# Patient Record
Sex: MALE | Race: WHITE | Employment: OTHER | URBAN - NONMETROPOLITAN AREA
[De-identification: names, ages, dates, MRNs, and addresses within clinical notes are randomized per-mention and may not be internally consistent; named-entity substitution may affect disease eponyms.]

---

## 2017-03-19 PROBLEM — N17.0 ACUTE RENAL FAILURE WITH TUBULAR NECROSIS (HCC): Status: ACTIVE | Noted: 2017-03-19

## 2017-03-19 PROBLEM — I25.810 CORONARY ARTERY DISEASE INVOLVING CORONARY BYPASS GRAFT OF NATIVE HEART: Status: ACTIVE | Noted: 2017-03-19

## 2017-03-19 PROBLEM — Z86.73 HX TIA/STROKE W/O RESID: Status: ACTIVE | Noted: 2017-03-19

## 2017-03-19 PROBLEM — I50.23 ACUTE ON CHRONIC SYSTOLIC CHF (CONGESTIVE HEART FAILURE), NYHA CLASS 2 (HCC): Status: ACTIVE | Noted: 2017-03-19

## 2017-03-19 PROBLEM — A41.9 SEPSIS (HCC): Status: ACTIVE | Noted: 2017-03-19

## 2017-03-19 PROBLEM — I25.2 HX OF NON-ST ELEVATION MYOCARDIAL INFARCTION (NSTEMI): Chronic | Status: ACTIVE | Noted: 2017-03-19

## 2017-03-20 PROBLEM — I25.810 CORONARY ARTERY DISEASE INVOLVING CORONARY BYPASS GRAFT OF NATIVE HEART: Status: RESOLVED | Noted: 2017-03-19 | Resolved: 2017-03-20

## 2017-03-20 PROBLEM — I50.23 ACUTE ON CHRONIC SYSTOLIC CHF (CONGESTIVE HEART FAILURE), NYHA CLASS 2 (HCC): Status: RESOLVED | Noted: 2017-03-19 | Resolved: 2017-03-20

## 2017-03-21 PROBLEM — N20.0 NEPHROLITHIASIS: Status: ACTIVE | Noted: 2017-03-21

## 2017-03-21 PROBLEM — N17.0 ACUTE RENAL FAILURE WITH TUBULAR NECROSIS (HCC): Status: RESOLVED | Noted: 2017-03-19 | Resolved: 2017-03-21

## 2017-03-21 PROBLEM — R65.10 SIRS (SYSTEMIC INFLAMMATORY RESPONSE SYNDROME) (HCC): Status: ACTIVE | Noted: 2017-03-19

## 2017-03-29 ENCOUNTER — TELEPHONE (OUTPATIENT)
Dept: UROLOGY | Age: 82
End: 2017-03-29

## 2017-03-31 ENCOUNTER — TELEPHONE (OUTPATIENT)
Dept: UROLOGY | Age: 82
End: 2017-03-31

## 2017-03-31 DIAGNOSIS — Z01.818 PRE-OP TESTING: ICD-10-CM

## 2017-03-31 DIAGNOSIS — N20.0 NEPHROLITHIASIS: Primary | ICD-10-CM

## 2017-04-04 ENCOUNTER — TELEPHONE (OUTPATIENT)
Dept: UROLOGY | Age: 82
End: 2017-04-04

## 2017-04-04 DIAGNOSIS — N20.0 NEPHROLITHIASIS: Primary | ICD-10-CM

## 2017-04-11 ENCOUNTER — TELEPHONE (OUTPATIENT)
Dept: UROLOGY | Age: 82
End: 2017-04-11

## 2017-04-13 ENCOUNTER — OFFICE VISIT (OUTPATIENT)
Dept: UROLOGY | Age: 82
End: 2017-04-13

## 2017-04-13 ENCOUNTER — TELEPHONE (OUTPATIENT)
Dept: UROLOGY | Age: 82
End: 2017-04-13

## 2017-04-13 VITALS
WEIGHT: 240 LBS | DIASTOLIC BLOOD PRESSURE: 64 MMHG | HEIGHT: 69 IN | SYSTOLIC BLOOD PRESSURE: 122 MMHG | BODY MASS INDEX: 35.55 KG/M2

## 2017-04-13 DIAGNOSIS — N20.0 KIDNEY STONE: Primary | ICD-10-CM

## 2017-04-13 LAB
BILIRUBIN URINE: NEGATIVE
BLOOD URINE, POC: NORMAL
CHARACTER, URINE: CLEAR
COLOR, URINE: YELLOW
GLUCOSE URINE: NEGATIVE MG/DL
KETONES, URINE: NEGATIVE
LEUKOCYTE CLUMPS, URINE: NORMAL
NITRITE, URINE: NEGATIVE
PH, URINE: 7
PROTEIN, URINE: NORMAL MG/DL
SPECIFIC GRAVITY, URINE: 1.01 (ref 1–1.03)
UROBILINOGEN, URINE: 0.2 EU/DL

## 2017-04-13 PROCEDURE — 1036F TOBACCO NON-USER: CPT | Performed by: UROLOGY

## 2017-04-13 PROCEDURE — 99024 POSTOP FOLLOW-UP VISIT: CPT | Performed by: UROLOGY

## 2017-04-13 PROCEDURE — 81003 URINALYSIS AUTO W/O SCOPE: CPT | Performed by: UROLOGY

## 2017-04-13 RX ORDER — ASPIRIN 325 MG
325 TABLET ORAL DAILY
COMMUNITY
End: 2017-04-20 | Stop reason: HOSPADM

## 2017-04-19 ENCOUNTER — TELEPHONE (OUTPATIENT)
Dept: UROLOGY | Age: 82
End: 2017-04-19

## 2017-05-02 ENCOUNTER — PROCEDURE VISIT (OUTPATIENT)
Dept: UROLOGY | Age: 82
End: 2017-05-02

## 2017-05-02 ENCOUNTER — TELEPHONE (OUTPATIENT)
Dept: UROLOGY | Age: 82
End: 2017-05-02

## 2017-05-02 VITALS
HEIGHT: 68 IN | BODY MASS INDEX: 36.37 KG/M2 | DIASTOLIC BLOOD PRESSURE: 70 MMHG | WEIGHT: 240 LBS | SYSTOLIC BLOOD PRESSURE: 128 MMHG

## 2017-05-02 DIAGNOSIS — N20.0 KIDNEY STONE ON LEFT SIDE: ICD-10-CM

## 2017-05-02 DIAGNOSIS — N20.0 RIGHT KIDNEY STONE: Primary | ICD-10-CM

## 2017-05-02 LAB
BILIRUBIN URINE: NEGATIVE
BLOOD URINE, POC: NORMAL
CHARACTER, URINE: CLEAR
COLOR, URINE: YELLOW
GLUCOSE URINE: NEGATIVE MG/DL
KETONES, URINE: NEGATIVE
LEUKOCYTE CLUMPS, URINE: NORMAL
NITRITE, URINE: NEGATIVE
PH, URINE: 7
PROTEIN, URINE: NEGATIVE MG/DL
SPECIFIC GRAVITY, URINE: 1.02 (ref 1–1.03)
UROBILINOGEN, URINE: 0.2 EU/DL

## 2017-05-02 PROCEDURE — 52310 CYSTOSCOPY AND TREATMENT: CPT | Performed by: UROLOGY

## 2017-05-02 PROCEDURE — 1036F TOBACCO NON-USER: CPT | Performed by: UROLOGY

## 2017-05-02 PROCEDURE — 99999 PR OFFICE/OUTPT VISIT,PROCEDURE ONLY: CPT | Performed by: UROLOGY

## 2017-05-02 PROCEDURE — 81003 URINALYSIS AUTO W/O SCOPE: CPT | Performed by: UROLOGY

## 2017-05-03 ENCOUNTER — OFFICE VISIT (OUTPATIENT)
Dept: CARDIOLOGY | Age: 82
End: 2017-05-03

## 2017-05-03 ENCOUNTER — TELEPHONE (OUTPATIENT)
Dept: UROLOGY | Age: 82
End: 2017-05-03

## 2017-05-03 VITALS
HEART RATE: 92 BPM | WEIGHT: 240 LBS | SYSTOLIC BLOOD PRESSURE: 158 MMHG | DIASTOLIC BLOOD PRESSURE: 62 MMHG | HEIGHT: 68 IN | BODY MASS INDEX: 36.37 KG/M2

## 2017-05-03 DIAGNOSIS — Z98.890 S/P CARDIAC CATHETERIZATION: ICD-10-CM

## 2017-05-03 DIAGNOSIS — I25.2 HX OF NON-ST ELEVATION MYOCARDIAL INFARCTION (NSTEMI): Primary | Chronic | ICD-10-CM

## 2017-05-03 DIAGNOSIS — Z95.1 S/P CABG (CORONARY ARTERY BYPASS GRAFT): ICD-10-CM

## 2017-05-03 DIAGNOSIS — N20.0 KIDNEY STONE: ICD-10-CM

## 2017-05-03 DIAGNOSIS — Z01.818 PRE-OP TESTING: Primary | ICD-10-CM

## 2017-05-03 PROCEDURE — 1123F ACP DISCUSS/DSCN MKR DOCD: CPT | Performed by: INTERNAL MEDICINE

## 2017-05-03 PROCEDURE — G8427 DOCREV CUR MEDS BY ELIG CLIN: HCPCS | Performed by: INTERNAL MEDICINE

## 2017-05-03 PROCEDURE — 4040F PNEUMOC VAC/ADMIN/RCVD: CPT | Performed by: INTERNAL MEDICINE

## 2017-05-03 PROCEDURE — G8598 ASA/ANTIPLAT THER USED: HCPCS | Performed by: INTERNAL MEDICINE

## 2017-05-03 PROCEDURE — 1036F TOBACCO NON-USER: CPT | Performed by: INTERNAL MEDICINE

## 2017-05-03 PROCEDURE — 99213 OFFICE O/P EST LOW 20 MIN: CPT | Performed by: INTERNAL MEDICINE

## 2017-05-03 PROCEDURE — G8417 CALC BMI ABV UP PARAM F/U: HCPCS | Performed by: INTERNAL MEDICINE

## 2017-05-03 ASSESSMENT — ENCOUNTER SYMPTOMS
EYES NEGATIVE: 1
GASTROINTESTINAL NEGATIVE: 1
RESPIRATORY NEGATIVE: 1

## 2017-06-15 ENCOUNTER — OFFICE VISIT (OUTPATIENT)
Dept: UROLOGY | Age: 82
End: 2017-06-15

## 2017-06-15 VITALS
HEIGHT: 68 IN | SYSTOLIC BLOOD PRESSURE: 118 MMHG | BODY MASS INDEX: 36.37 KG/M2 | DIASTOLIC BLOOD PRESSURE: 52 MMHG | WEIGHT: 240 LBS

## 2017-06-15 DIAGNOSIS — N20.0 KIDNEY STONE: Primary | ICD-10-CM

## 2017-06-15 LAB
BILIRUBIN URINE: NEGATIVE
BLOOD URINE, POC: NORMAL
CHARACTER, URINE: CLEAR
COLOR, URINE: YELLOW
GLUCOSE URINE: NEGATIVE MG/DL
KETONES, URINE: NEGATIVE
LEUKOCYTE CLUMPS, URINE: NORMAL
NITRITE, URINE: NEGATIVE
PH, URINE: 5.5
PROTEIN, URINE: NEGATIVE MG/DL
SPECIFIC GRAVITY, URINE: 1.01 (ref 1–1.03)
UROBILINOGEN, URINE: 0.2 EU/DL

## 2017-06-15 PROCEDURE — 99024 POSTOP FOLLOW-UP VISIT: CPT | Performed by: UROLOGY

## 2017-06-15 PROCEDURE — 81003 URINALYSIS AUTO W/O SCOPE: CPT | Performed by: UROLOGY

## 2017-11-07 ENCOUNTER — OFFICE VISIT (OUTPATIENT)
Dept: CARDIOLOGY CLINIC | Age: 82
End: 2017-11-07
Payer: MEDICARE

## 2017-11-07 VITALS
WEIGHT: 253.4 LBS | BODY MASS INDEX: 37.53 KG/M2 | HEART RATE: 92 BPM | DIASTOLIC BLOOD PRESSURE: 68 MMHG | SYSTOLIC BLOOD PRESSURE: 150 MMHG | HEIGHT: 69 IN

## 2017-11-07 DIAGNOSIS — N18.30 CKD (CHRONIC KIDNEY DISEASE) STAGE 3, GFR 30-59 ML/MIN (HCC): Chronic | ICD-10-CM

## 2017-11-07 DIAGNOSIS — Z98.890 S/P CARDIAC CATHETERIZATION: Primary | ICD-10-CM

## 2017-11-07 DIAGNOSIS — Z95.1 S/P CABG (CORONARY ARTERY BYPASS GRAFT): ICD-10-CM

## 2017-11-07 DIAGNOSIS — I25.2 HX OF NON-ST ELEVATION MYOCARDIAL INFARCTION (NSTEMI): Chronic | ICD-10-CM

## 2017-11-07 PROCEDURE — 1123F ACP DISCUSS/DSCN MKR DOCD: CPT | Performed by: INTERNAL MEDICINE

## 2017-11-07 PROCEDURE — G8484 FLU IMMUNIZE NO ADMIN: HCPCS | Performed by: INTERNAL MEDICINE

## 2017-11-07 PROCEDURE — 99213 OFFICE O/P EST LOW 20 MIN: CPT | Performed by: INTERNAL MEDICINE

## 2017-11-07 PROCEDURE — 93000 ELECTROCARDIOGRAM COMPLETE: CPT | Performed by: INTERNAL MEDICINE

## 2017-11-07 PROCEDURE — 4040F PNEUMOC VAC/ADMIN/RCVD: CPT | Performed by: INTERNAL MEDICINE

## 2017-11-07 PROCEDURE — G8427 DOCREV CUR MEDS BY ELIG CLIN: HCPCS | Performed by: INTERNAL MEDICINE

## 2017-11-07 PROCEDURE — G8598 ASA/ANTIPLAT THER USED: HCPCS | Performed by: INTERNAL MEDICINE

## 2017-11-07 PROCEDURE — 1036F TOBACCO NON-USER: CPT | Performed by: INTERNAL MEDICINE

## 2017-11-07 PROCEDURE — G8417 CALC BMI ABV UP PARAM F/U: HCPCS | Performed by: INTERNAL MEDICINE

## 2017-11-07 RX ORDER — METOPROLOL TARTRATE 100 MG/1
50 TABLET ORAL DAILY
COMMUNITY
End: 2018-05-07 | Stop reason: ALTCHOICE

## 2017-11-07 ASSESSMENT — ENCOUNTER SYMPTOMS
EYES NEGATIVE: 1
RESPIRATORY NEGATIVE: 1
GASTROINTESTINAL NEGATIVE: 1

## 2017-11-07 NOTE — PROGRESS NOTES
[facility] PROFESSIONAL SERVS  [facility] [place]  [address]  [address]  [address] [address]  Dept: [phone]  Dept Fax: [phone]  Loc: [phone]    Visit Date: 11/7/2017    Mr. Sangita Arreguin is a 80 y.o. who is here for follow up on coronary artery disease status post CABG in June 2016. He feels good. No dizziness or syncope. HPI:   Mr. Sangita Arreguin is a 80 y.o. male who is seen today for follow up on the cardiac problems mentioned below. MEDICAL DIAGNOSES  Patient Active Problem List    Diagnosis Date Noted    Calculus of kidney 03/21/2017    Right ureteral stone     Urinary tract infection without hematuria     Sepsis ([facility] [address] 03/19/2017    Coronary artery disease involving coronary bypass graft of native heart without angina pectoris 03/19/2017    Hx of non-ST elevation myocardial infarction (NSTEMI) 03/19/2017    History of TIA (transient ischemic attack) and stroke 03/19/2017     Overview Note:     Updating deleted diagnoses      DNR no code (do not resuscitate)     Physical deconditioning 07/21/2016    S/P CABG (coronary artery bypass graft)     History of Clostridium difficile colitis     Coronary artery disease involving native coronary artery of native heart with angina pectoris (HCC)     CKD (chronic kidney disease) stage 3, GFR 30-59 ml/min 06/20/2016    S/P cardiac cath: 6/20/2016: Tight LM lesion estimated at 90% along with the ostium of LAD. The ostium of the LCX seems to be significantly narrowed as well. 90% mid-RCA and 100% distal RCA 06/20/2016     Overview Note:     6/20/2016: Tight LM lesion estimated at 90% along with the ostium of LAD. The ostium of the LCX seems to be significantly narrowed as well. 90% mid-RCA and 100% distal RCA. IABP placed.   Dr. Arnold Marley Obstructive sleep apnea     MOON (acute kidney injury) ([facility] [address]        No Known Allergies    Current Outpatient Prescriptions   Medication Sig Dispense Refill    metoprolol (LOPRESSOR) 100 MG tablet Take 50 mg by on the left side. Popliteal pulses are 2+ on the right side, and 2+ on the left side. Dorsalis pedis pulses are 2+ on the right side, and 2+ on the left side. Posterior tibial pulses are 2+ on the right side, and 2+ on the left side. Pulmonary/Chest: Effort normal and breath sounds normal.   Abdominal: Soft. Bowel sounds are normal. He exhibits no mass. There is no tenderness. Musculoskeletal: He exhibits no edema. Lymphadenopathy:     He has no cervical adenopathy. Neurological: He is alert and oriented to person, place, and time. He has normal reflexes. No cranial nerve deficit. Skin: Skin is warm. No rash noted. Psychiatric: He has a normal mood and affect.        BP (!) 150/68   Pulse 92   Ht 5' 9\" (1.753 m)   Wt 253 lb 6.4 oz (114.9 kg)   BMI 37.42 kg/m²   Wt Readings from Last 3 Encounters:   11/07/17 253 lb 6.4 oz (114.9 kg)   06/15/17 240 lb (108.9 kg)   06/01/17 242 lb 3.2 oz (109.9 kg)     BP Readings from Last 3 Encounters:   11/07/17 (!) 150/68   06/15/17 (!) 118/52   06/01/17 136/63       Lab Data  CBC:   Lab Results   Component Value Date    WBC 15.2 03/22/2017    RBC 3.17 03/22/2017    HGB 9.9 03/22/2017    HGB 8.5 03/21/2017    HGB 9.1 03/20/2017    HCT 31.3 03/22/2017    MCV 98.8 03/22/2017    MCH 31.2 03/22/2017    MCHC 31.6 03/22/2017    RDW 14.6 03/22/2017     03/22/2017     03/21/2017     03/20/2017    MPV 8.1 03/22/2017     CMP:    Lab Results   Component Value Date     03/23/2017    K 4.1 06/01/2017     03/23/2017    CO2 21 03/23/2017    BUN 26 03/23/2017    CREATININE 1.3 03/23/2017    CREATININE 1.5 03/22/2017    CREATININE 2.3 03/21/2017    LABGLOM 53 03/23/2017    GLUCOSE 122 03/23/2017    PROT 6.0 03/19/2017    LABALBU 2.2 03/19/2017    CALCIUM 8.1 03/23/2017    BILITOT 0.3 03/19/2017    ALKPHOS 71 03/19/2017    AST 14 03/19/2017    ALT 20 03/19/2017     Hepatic Function Panel:    Lab Results   Component Value Date circumflex artery is a codominant vessel that contains a  tight lesion at the ostium which is estimated at 80-90 percent and  is good target vessel distally. 5. LV gram was performed in the BONILLA projection and showed global  hypokinesis with ejection fraction of 40 percent. 6. Totally occluded distal RCA as described above. 7. Intra-aortic balloon pump was placed successfully without  complications. The following diagnoses were addressed during this visit:  1. S/P cardiac cath: 6/20/2016: Tight LM lesion estimated at 90% along with the ostium of LAD. The ostium of the LCX seems to be significantly narrowed as well. 90% mid-RCA and 100% distal RCA  EKG 12 Lead   2. CKD (chronic kidney disease) stage 3, GFR 30-59 ml/min  EKG 12 Lead   3. Hx of non-ST elevation myocardial infarction (NSTEMI)  EKG 12 Lead   4. S/P CABG (coronary artery bypass graft)         Plan:      Patient denies chest pains, SOB, palpitations, edema, dizziness/lightheadedness.     EKG done today NSR with RBBB and LAFB.    Medication refills    Jaycob Smith has recovered nicely from his CABG.   Orders Placed:  Orders Placed This Encounter   Procedures    EKG 12 Lead     Order Specific Question:   Reason for Exam?     Answer: Other     Medications:  No orders of the defined types were placed in this encounter. Discussed use, benefit, and side effects of prescribed medications. All patient questions answered. Pt voiced understanding. Instructed to continue current medications, diet and exercise. Continue risk factor modification and medical management. Patient agreed with treatment plan. Follow up as directed. The patient will be seen in 6 months for re-evaluation or sooner if he develops new symptoms. In the mean time, the patient will follow up with Francie Ledesma as scheduled.

## 2018-01-26 ENCOUNTER — HOSPITAL ENCOUNTER (OUTPATIENT)
Age: 83
Setting detail: OUTPATIENT SURGERY
Discharge: HOME | End: 2018-01-26
Attending: SPECIALIST | Admitting: SPECIALIST
Payer: MEDICARE

## 2018-01-26 VITALS
WEIGHT: 251.8 LBS | SYSTOLIC BLOOD PRESSURE: 133 MMHG | TEMPERATURE: 97.9 F | OXYGEN SATURATION: 96 % | HEIGHT: 68 IN | HEART RATE: 75 BPM | BODY MASS INDEX: 38.16 KG/M2 | RESPIRATION RATE: 14 BRPM | DIASTOLIC BLOOD PRESSURE: 56 MMHG

## 2018-01-26 PROCEDURE — 7100000010 HC PHASE II RECOVERY - FIRST 15 MIN: Performed by: SPECIALIST

## 2018-01-26 PROCEDURE — 2500000003 HC RX 250 WO HCPCS: Performed by: SPECIALIST

## 2018-01-26 PROCEDURE — 3600000012 HC SURGERY LEVEL 2 ADDTL 15MIN: Performed by: SPECIALIST

## 2018-01-26 PROCEDURE — A6223 GAUZE >16<=48 NO W/SAL W/O B: HCPCS | Performed by: SPECIALIST

## 2018-01-26 PROCEDURE — 3600000002 HC SURGERY LEVEL 2 BASE: Performed by: SPECIALIST

## 2018-01-26 PROCEDURE — 7100000011 HC PHASE II RECOVERY - ADDTL 15 MIN: Performed by: SPECIALIST

## 2018-01-26 RX ORDER — LIDOCAINE HYDROCHLORIDE 10 MG/ML
INJECTION, SOLUTION INFILTRATION; PERINEURAL PRN
Status: DISCONTINUED | OUTPATIENT
Start: 2018-01-26 | End: 2018-01-26 | Stop reason: HOSPADM

## 2018-01-26 RX ORDER — CEFADROXIL 500 MG/1
500 CAPSULE ORAL 2 TIMES DAILY
COMMUNITY
End: 2018-05-07 | Stop reason: ALTCHOICE

## 2018-01-26 ASSESSMENT — PAIN SCALES - GENERAL: PAINLEVEL_OUTOF10: 0

## 2018-01-26 ASSESSMENT — PAIN - FUNCTIONAL ASSESSMENT: PAIN_FUNCTIONAL_ASSESSMENT: 0-10

## 2018-01-26 NOTE — OP NOTE
Operative Note    Patient name: Moises Cook             Medical Record Number: [id number]    Primary Care Physician: Jorge YANG 1934    Date of Procedure: 2018    Pre-operative Diagnosis: 3cm2 defect of left earlobe s/p MOHS for basal cell carcinoma    Post-operative Diagnosis: Same    Procedure Performed: 4cm complex closure    Surgeons/Assistants: Dr. Luigi Mccallum PGY1    Estimated Blood Loss: 2ml     Complications: none immediately appreciated    Procedure: With the patient lying in the supine position after having anesthetized the area with a total of 11 ml of 1% Lidocaine 1:100,000 with epinephrine solution, the area was then prepped  draped in the standard surgical fashion. There was a very sizeable defect, which could not be closed primarily. Therefore, after the beveled/cauterized edges were debrided to healthy appearing tissue the earlobe was reattached with 4-0 Monocryl suture placed in interrupted buried fashion. The Burrow's triangles were resected to prevent dog-ear deformity and final closure was completed using 4-0 nylon and bacitracin. The patient tolerated the procedure quite well and remained hemodynamically stable throughout the procedure and was quite comfortable throughout the operative course.     Clinical staging for cancer cases:  Ct  Cn  Cm    Pasha Ortiz  Electronically signed by me on 2018 at 12:29 PM

## 2018-05-07 ENCOUNTER — OFFICE VISIT (OUTPATIENT)
Dept: CARDIOLOGY CLINIC | Age: 83
End: 2018-05-07
Payer: MEDICARE

## 2018-05-07 VITALS
HEIGHT: 68 IN | BODY MASS INDEX: 38.95 KG/M2 | SYSTOLIC BLOOD PRESSURE: 136 MMHG | HEART RATE: 94 BPM | WEIGHT: 257 LBS | DIASTOLIC BLOOD PRESSURE: 64 MMHG

## 2018-05-07 DIAGNOSIS — Z95.1 S/P CABG (CORONARY ARTERY BYPASS GRAFT): ICD-10-CM

## 2018-05-07 DIAGNOSIS — I25.10 CORONARY ARTERY DISEASE INVOLVING NATIVE CORONARY ARTERY OF NATIVE HEART WITHOUT ANGINA PECTORIS: Primary | ICD-10-CM

## 2018-05-07 DIAGNOSIS — N18.30 CKD (CHRONIC KIDNEY DISEASE) STAGE 3, GFR 30-59 ML/MIN (HCC): ICD-10-CM

## 2018-05-07 PROCEDURE — 1036F TOBACCO NON-USER: CPT | Performed by: PHYSICIAN ASSISTANT

## 2018-05-07 PROCEDURE — 1123F ACP DISCUSS/DSCN MKR DOCD: CPT | Performed by: PHYSICIAN ASSISTANT

## 2018-05-07 PROCEDURE — G8598 ASA/ANTIPLAT THER USED: HCPCS | Performed by: PHYSICIAN ASSISTANT

## 2018-05-07 PROCEDURE — G8417 CALC BMI ABV UP PARAM F/U: HCPCS | Performed by: PHYSICIAN ASSISTANT

## 2018-05-07 PROCEDURE — 4040F PNEUMOC VAC/ADMIN/RCVD: CPT | Performed by: PHYSICIAN ASSISTANT

## 2018-05-07 PROCEDURE — G8427 DOCREV CUR MEDS BY ELIG CLIN: HCPCS | Performed by: PHYSICIAN ASSISTANT

## 2018-05-07 PROCEDURE — 99213 OFFICE O/P EST LOW 20 MIN: CPT | Performed by: PHYSICIAN ASSISTANT

## 2018-05-07 RX ORDER — METOPROLOL TARTRATE 50 MG/1
50 TABLET, FILM COATED ORAL 2 TIMES DAILY
Qty: 60 TABLET | Refills: 3 | Status: SHIPPED | OUTPATIENT
Start: 2018-05-07 | End: 2018-09-11 | Stop reason: SDUPTHER

## 2018-06-19 DIAGNOSIS — N20.0 CALCULUS OF KIDNEY: Primary | ICD-10-CM

## 2018-06-21 ENCOUNTER — HOSPITAL ENCOUNTER (OUTPATIENT)
Age: 83
Discharge: HOME | End: 2018-06-21
Payer: MEDICARE

## 2018-06-21 ENCOUNTER — HOSPITAL ENCOUNTER (OUTPATIENT)
Dept: GENERAL RADIOLOGY | Age: 83
Discharge: HOME | End: 2018-06-21
Payer: MEDICARE

## 2018-06-21 ENCOUNTER — OFFICE VISIT (OUTPATIENT)
Dept: UROLOGY | Age: 83
End: 2018-06-21
Payer: MEDICARE

## 2018-06-21 VITALS
SYSTOLIC BLOOD PRESSURE: 130 MMHG | WEIGHT: 253 LBS | BODY MASS INDEX: 38.34 KG/M2 | DIASTOLIC BLOOD PRESSURE: 72 MMHG | HEIGHT: 68 IN

## 2018-06-21 DIAGNOSIS — N20.0 RENAL CALCULUS, BILATERAL: Primary | ICD-10-CM

## 2018-06-21 DIAGNOSIS — E55.9 VITAMIN D DEFICIENCY: ICD-10-CM

## 2018-06-21 DIAGNOSIS — N20.0 CALCULUS OF KIDNEY: ICD-10-CM

## 2018-06-21 LAB
BILIRUBIN URINE: NEGATIVE
BLOOD URINE, POC: NEGATIVE
CHARACTER, URINE: CLEAR
COLOR, URINE: YELLOW
GLUCOSE URINE: NEGATIVE MG/DL
KETONES, URINE: NEGATIVE
LEUKOCYTE CLUMPS, URINE: ABNORMAL
NITRITE, URINE: NEGATIVE
PH, URINE: 8.5
PROTEIN, URINE: 30 MG/DL
SPECIFIC GRAVITY, URINE: 1.01 (ref 1–1.03)
UROBILINOGEN, URINE: 0.2 EU/DL

## 2018-06-21 PROCEDURE — 1036F TOBACCO NON-USER: CPT | Performed by: NURSE PRACTITIONER

## 2018-06-21 PROCEDURE — 81003 URINALYSIS AUTO W/O SCOPE: CPT | Performed by: NURSE PRACTITIONER

## 2018-06-21 PROCEDURE — 4040F PNEUMOC VAC/ADMIN/RCVD: CPT | Performed by: NURSE PRACTITIONER

## 2018-06-21 PROCEDURE — G8598 ASA/ANTIPLAT THER USED: HCPCS | Performed by: NURSE PRACTITIONER

## 2018-06-21 PROCEDURE — 99214 OFFICE O/P EST MOD 30 MIN: CPT | Performed by: NURSE PRACTITIONER

## 2018-06-21 PROCEDURE — 74018 RADEX ABDOMEN 1 VIEW: CPT

## 2018-06-21 PROCEDURE — 1123F ACP DISCUSS/DSCN MKR DOCD: CPT | Performed by: NURSE PRACTITIONER

## 2018-06-21 PROCEDURE — G8417 CALC BMI ABV UP PARAM F/U: HCPCS | Performed by: NURSE PRACTITIONER

## 2018-06-21 PROCEDURE — G8427 DOCREV CUR MEDS BY ELIG CLIN: HCPCS | Performed by: NURSE PRACTITIONER

## 2018-07-03 LAB
PTH INTACT: 37
VITAMIN D 25-HYDROXY: 34
VITAMIN D2, 25 HYDROXY: NORMAL
VITAMIN D3,25 HYDROXY: NORMAL

## 2018-09-11 RX ORDER — METOPROLOL TARTRATE 50 MG/1
50 TABLET, FILM COATED ORAL 2 TIMES DAILY
Qty: 60 TABLET | Refills: 3 | Status: SHIPPED | OUTPATIENT
Start: 2018-09-11 | End: 2018-11-19 | Stop reason: SDUPTHER

## 2018-09-11 NOTE — TELEPHONE ENCOUNTER
Brian Beltran called requesting a refill on the following medications:  Requested Prescriptions     Pending Prescriptions Disp Refills    metoprolol tartrate (LOPRESSOR) 50 MG tablet 60 tablet 3     Sig: Take 1 tablet by mouth 2 times daily     Pharmacy verified:  [email or web address]       Date of last visit: 05-07-18   Date of next visit (if applicable): 18/10/2540

## 2018-11-19 ENCOUNTER — OFFICE VISIT (OUTPATIENT)
Dept: CARDIOLOGY CLINIC | Age: 83
End: 2018-11-19
Payer: MEDICARE

## 2018-11-19 VITALS
DIASTOLIC BLOOD PRESSURE: 67 MMHG | BODY MASS INDEX: 38.8 KG/M2 | WEIGHT: 256 LBS | HEIGHT: 68 IN | SYSTOLIC BLOOD PRESSURE: 136 MMHG | HEART RATE: 76 BPM

## 2018-11-19 DIAGNOSIS — I25.119 CORONARY ARTERY DISEASE INVOLVING NATIVE CORONARY ARTERY OF NATIVE HEART WITH ANGINA PECTORIS (HCC): Primary | ICD-10-CM

## 2018-11-19 PROCEDURE — 99213 OFFICE O/P EST LOW 20 MIN: CPT | Performed by: INTERNAL MEDICINE

## 2018-11-19 PROCEDURE — G8417 CALC BMI ABV UP PARAM F/U: HCPCS | Performed by: INTERNAL MEDICINE

## 2018-11-19 PROCEDURE — 1036F TOBACCO NON-USER: CPT | Performed by: INTERNAL MEDICINE

## 2018-11-19 PROCEDURE — 1123F ACP DISCUSS/DSCN MKR DOCD: CPT | Performed by: INTERNAL MEDICINE

## 2018-11-19 PROCEDURE — 93000 ELECTROCARDIOGRAM COMPLETE: CPT | Performed by: INTERNAL MEDICINE

## 2018-11-19 PROCEDURE — 1101F PT FALLS ASSESS-DOCD LE1/YR: CPT | Performed by: INTERNAL MEDICINE

## 2018-11-19 PROCEDURE — G8598 ASA/ANTIPLAT THER USED: HCPCS | Performed by: INTERNAL MEDICINE

## 2018-11-19 PROCEDURE — G8428 CUR MEDS NOT DOCUMENT: HCPCS | Performed by: INTERNAL MEDICINE

## 2018-11-19 PROCEDURE — G8484 FLU IMMUNIZE NO ADMIN: HCPCS | Performed by: INTERNAL MEDICINE

## 2018-11-19 PROCEDURE — 4040F PNEUMOC VAC/ADMIN/RCVD: CPT | Performed by: INTERNAL MEDICINE

## 2018-11-19 ASSESSMENT — ENCOUNTER SYMPTOMS
DOUBLE VISION: 0
CHANGE IN BOWEL HABIT: 0
EYE PAIN: 0
ORTHOPNEA: 0
BLURRED VISION: 0
SHORTNESS OF BREATH: 0
CONSTIPATION: 0
DIARRHEA: 0
ABDOMINAL PAIN: 0
BACK PAIN: 0
EYE DISCHARGE: 0
BOWEL INCONTINENCE: 0
HEMOPTYSIS: 0
BLOATING: 0
SPUTUM PRODUCTION: 0
COUGH: 0
HOARSE VOICE: 0

## 2018-11-19 NOTE — PROGRESS NOTES
myocardial infarction) ([facility] [address] Obesity (BMI 35.0-39.9 without comorbidity); ARGENIS (obstructive sleep apnea); S/P CABG x 3; S/P cardiac cath: 6/20/2016: Tight LM lesion estimated at 90% along with the ostium of LAD. The ostium of the LCX seems to be significantly narrowed as well. 90% mid-RCA and 100% distal RCA; and TIA (transient ischemic attack). Social History  Clau Rob  reports that he quit smoking about 68 years ago. His smoking use included Cigarettes. He started smoking about 72 years ago. He has a 7.50 pack-year smoking history. He has never used smokeless tobacco. He reports that he drinks about 1.2 oz of alcohol per week . He reports that he does not use drugs. Family History  Clau Rob family history includes Cancer in his father. Past Surgical History   Past Surgical History:   Procedure Laterality Date    CARDIAC SURGERY  06/2016    triple by pass    CYSTOSCOPY  04/20/2017    Right Ureteroscopy, Basket Retrieval of Stones, Stent - Dr. Mariia Harper  2009??    bilateral cataracts    MOHS SURGERY Left 01/26/2018    Mohs Repair BCC Left Earlobe     TX OUTER EAR SURGERY PROC UNLISTED Left 1/26/2018    MOHS REPAIR BCC LEFT EARLOBE performed by Amado Verma MD at [facility]         Subjective:     Review of Systems   Constitution: Negative for decreased appetite, diaphoresis, fever, weakness and malaise/fatigue. HENT: Negative for congestion, hearing loss and hoarse voice. Eyes: Negative for blurred vision, discharge, double vision and pain. Cardiovascular: Negative for chest pain, claudication, cyanosis, dyspnea on exertion, irregular heartbeat, leg swelling, near-syncope, orthopnea, palpitations, paroxysmal nocturnal dyspnea and syncope. Respiratory: Negative for cough, hemoptysis, shortness of breath and sputum production. Endocrine: Negative for cold intolerance, heat intolerance, polydipsia, polyphagia and polyuria.    Musculoskeletal: Negative HGB 9.9 03/22/2017    HCT 31.3 03/22/2017    MCV 98.8 03/22/2017    MCH 31.2 03/22/2017    MCHC 31.6 03/22/2017    RDW 14.6 03/22/2017     03/22/2017    MPV 8.1 03/22/2017       Lab Results   Component Value Date     03/23/2017    K 4.1 06/01/2017     03/23/2017    CO2 21 03/23/2017    BUN 26 03/23/2017    LABALBU 2.2 03/19/2017    CREATININE 1.3 03/23/2017    CALCIUM 8.1 03/23/2017    LABGLOM 53 03/23/2017    GLUCOSE 122 03/23/2017       Lab Results   Component Value Date    ALKPHOS 71 03/19/2017    ALT 20 03/19/2017    AST 14 03/19/2017    PROT 6.0 03/19/2017    BILITOT 0.3 03/19/2017    BILIDIR <0.2 03/19/2017    LABALBU 2.2 03/19/2017       Lab Results   Component Value Date    MG 2.3 03/22/2017       Lab Results   Component Value Date    INR 0.98 06/01/2017    INR 1.52 (H) 03/20/2017    INR 1.26 (H) 03/19/2017         Lab Results   Component Value Date    LABA1C 5.1 06/15/2016       Lab Results   Component Value Date    TRIG 77 06/20/2016    HDL 48 06/20/2016    LDLCALC 34 06/20/2016       Lab Results   Component Value Date    TSH 5.930 07/16/2016         Testing Reviewed:      I haveindividually reviewed the below cardiac tests    EKG:SR, RBBB    ECHO:   Results for orders placed during the hospital encounter of 06/14/16   Echocardiogram 2D/ M-Mode/ Colorflow/ Do    Narrative Transthoracic Echocardiography Report (TTE)     Demographics      Patient Name    Katia Lacey  Gender               Male                   A      MR #            [id number]      Race                                                      Ethnicity      Account #       [de-identified]        Room Number          4D-09      Accession       [id number]      Date of Study        06/16/2016   Number      Date of Birth   1934     Referring Physician  José Patel MD      Age             80 year(s)     Sonographer          Lina Nicole cm/s   MV E' Lateral Velocity:                          PV Peak Gradient: 4.08   4.68 cm/s                                        mmHg   MV A' Lateral Velocity:   4.78 cm/s   E/E' septal: 17.79   E/E' lateral: 18.89   MR Velocity: 460 cm/s     [id number]       STRESS:    CATH:    Assessment/Plan       Diagnosis Orders   1. Coronary artery disease involving native coronary artery of native heart with angina pectoris (HCC)  EKG 12 lead     CAD s/p CABG  TIA  COPD  HTN  HLD  Chronic CHF    Denies any symptoms at present  Reports that he feels good  BP well controlled  Continue Aspirin, statin, metoprolol, lasix  The patient is asked to make an attempt to improve diet and exercise patterns to aid in medical management of this problem. Advised patient to call office or seek immediate medical attention if there is any new onset of  any chest pain, sob, palpitations, lightheadedness, dizziness, orthopnea, PND or pedal edema. Thank youfor allowing me to participate in the care of this patient. Please do not hesitate to contact me for any further questions. Return in about 6 months (around 5/19/2019), or if symptoms worsen or fail to improve, for Review testing, Regular follow up.        Electronically signed by Timothy Li MD [facility]  11/19/2018 at 12:54 PM

## 2018-11-20 RX ORDER — METOPROLOL TARTRATE 50 MG/1
50 TABLET, FILM COATED ORAL 2 TIMES DAILY
Qty: 180 TABLET | Refills: 3 | Status: SHIPPED | OUTPATIENT
Start: 2018-11-20 | End: 2019-10-28 | Stop reason: SDUPTHER

## 2019-04-09 ENCOUNTER — OFFICE VISIT (OUTPATIENT)
Dept: CARDIOLOGY CLINIC | Age: 84
End: 2019-04-09
Payer: MEDICARE

## 2019-04-09 VITALS
HEIGHT: 68 IN | WEIGHT: 258 LBS | SYSTOLIC BLOOD PRESSURE: 138 MMHG | HEART RATE: 72 BPM | BODY MASS INDEX: 39.1 KG/M2 | DIASTOLIC BLOOD PRESSURE: 78 MMHG

## 2019-04-09 DIAGNOSIS — I25.10 CORONARY ARTERY DISEASE INVOLVING NATIVE CORONARY ARTERY OF NATIVE HEART WITHOUT ANGINA PECTORIS: ICD-10-CM

## 2019-04-09 DIAGNOSIS — N18.30 CKD (CHRONIC KIDNEY DISEASE) STAGE 3, GFR 30-59 ML/MIN (HCC): ICD-10-CM

## 2019-04-09 DIAGNOSIS — R93.1 DECREASED CARDIAC EJECTION FRACTION: Primary | ICD-10-CM

## 2019-04-09 DIAGNOSIS — I25.2 HX OF NON-ST ELEVATION MYOCARDIAL INFARCTION (NSTEMI): ICD-10-CM

## 2019-04-09 DIAGNOSIS — I25.5 ISCHEMIC CARDIOMYOPATHY: ICD-10-CM

## 2019-04-09 DIAGNOSIS — Z86.73 HISTORY OF TIA (TRANSIENT ISCHEMIC ATTACK) AND STROKE: ICD-10-CM

## 2019-04-09 DIAGNOSIS — Z95.1 S/P CABG (CORONARY ARTERY BYPASS GRAFT): ICD-10-CM

## 2019-04-09 PROCEDURE — G8598 ASA/ANTIPLAT THER USED: HCPCS | Performed by: NURSE PRACTITIONER

## 2019-04-09 PROCEDURE — 99213 OFFICE O/P EST LOW 20 MIN: CPT | Performed by: NURSE PRACTITIONER

## 2019-04-09 PROCEDURE — G8417 CALC BMI ABV UP PARAM F/U: HCPCS | Performed by: NURSE PRACTITIONER

## 2019-04-09 PROCEDURE — 4040F PNEUMOC VAC/ADMIN/RCVD: CPT | Performed by: NURSE PRACTITIONER

## 2019-04-09 PROCEDURE — G8427 DOCREV CUR MEDS BY ELIG CLIN: HCPCS | Performed by: NURSE PRACTITIONER

## 2019-04-09 PROCEDURE — 1036F TOBACCO NON-USER: CPT | Performed by: NURSE PRACTITIONER

## 2019-04-09 PROCEDURE — 1123F ACP DISCUSS/DSCN MKR DOCD: CPT | Performed by: NURSE PRACTITIONER

## 2019-04-09 NOTE — PROGRESS NOTES
6 month follow-up. He denies having any chest pain, SOB, dizziness, lightheadedness, palpitations or JENNIFER. [facility] PROFESSIONAL SERVICES  [facility] [address] [address]   Dept: [phone]   Dept Fax: [phone]   Loc: [phone]      Chief Complaint   Patient presents with    6 Month Follow-Up    Coronary Artery Disease     6 month f/u office visit. Denies chest pain, palpitations, sob, JENNIFER, lightheadedness, dizziness or syncope. He has no s/s of fluid overload today. Has been completing his daily activities without difficulty and sleeping in his normal position. He states he doesn't exercise because he has legs don't work well. He walks with a walker. His wife is here with him and states this is a chronic issue. Cardiologist:  Dr. Sujata Conley:   No fever, no chills, No fatigue or weight loss  Pulmonary:    No dyspnea, no wheezing  Cardiac:    Denies recent chest pain   GI:     No nausea or vomiting, no abdominal pain  Neuro:    No dizziness or light headedness  Musculoskeletal:  No recent active issues  Extremities:   No edema, good peripheral pulses      Past Medical History:   Diagnosis Date    Acute on chronic diastolic CHF (congestive heart failure), NYHA class 2 (HCC)     Arthritis     Atrial flutter ([address] 6/15/2016    BPH (benign prostatic hyperplasia)     CAD (coronary artery disease)     Cancer (HCC)     skin    CKD (chronic kidney disease) stage 3, GFR 30-59 ml/min ([address] 6/20/2016    COPD with exacerbation ([facility] [address] 6/15/2016    Hyperlipidemia     Hypertension     NSTEMI (non-ST elevated myocardial infarction) ([facility] [address]     Obesity (BMI 35.0-39.9 without comorbidity) 06/27/2016    ARGENIS (obstructive sleep apnea)     uses cpap    S/P CABG x 3 06/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 The ostium of the LCX seems to be significantly narrowed as well.  90% mid-RCA and 100% distal RCA 2016: Tight LM lesion estimated at 90% along with the ostium of LAD. The ostium of the LCX seems to be significantly narrowed as well. 90% mid-RCA and 100% distal RCA. IABP placed. Dr. Aileen Krause TIA (transient ischemic attack)        No Known Allergies    Current Outpatient Medications   Medication Sig Dispense Refill    metoprolol tartrate (LOPRESSOR) 50 MG tablet Take 1 tablet by mouth 2 times daily 180 tablet 3    Lactobacillus (ACIDOPHILUS/PECTIN PO) Take by mouth      aspirin 81 MG tablet Take 325 mg by mouth daily       Multiple Vitamins-Minerals (THERAPEUTIC MULTIVITAMIN-MINERALS) tablet Take 1 tablet by mouth daily      Cholecalciferol (VITAMIN D PO) Take by mouth 2 times daily      furosemide (LASIX) 40 MG tablet Take 1 tablet by mouth daily 30 tablet 3    potassium chloride SA (K-DUR;KLOR-CON M) 20 MEQ tablet Take 1 tablet by mouth daily 60 tablet 3    tamsulosin (FLOMAX) 0.4 MG capsule Take 1 capsule by mouth daily 30 capsule 3    atorvastatin (LIPITOR) 20 MG tablet Take 1 tablet by mouth nightly 30 tablet 3    finasteride (PROSCAR) 5 MG tablet Take 5 mg by mouth daily       No current facility-administered medications for this visit.         Social History     Socioeconomic History    Marital status:      Spouse name: None    Number of children: None    Years of education: None    Highest education level: None   Occupational History    Occupation: [employer]     Employer: RETIRED   Social Needs    Financial resource strain: None    Food insecurity:     Worry: None     Inability: None    Transportation needs:     Medical: None     Non-medical: None   Tobacco Use    Smoking status: Former Smoker     Packs/day: 0.50     Years: 15.00     Pack years: 7.50     Types: Cigarettes     Start date: 1946     Last attempt to quit: 1950     Years since quittin.3    Smokeless tobacco: Never Used   Substance and Sexual Activity    Alcohol use: Complete 2D W Doppler W Color   3. Coronary artery disease involving native coronary artery of native heart without angina pectoris     4. S/P CABG (coronary artery bypass graft)     5. CKD (chronic kidney disease) stage 3, GFR 30-59 ml/min ([facility])     6. Hx of non-ST elevation myocardial infarction (NSTEMI)     7. History of TIA (transient ischemic attack) and stroke         Orders Placed This Encounter   Procedures    ECHO Complete 2D W Doppler W Color     Standing Status:   Future     Standing Expiration Date:   4/9/2020     Order Specific Question:   Reason for exam:     Answer:   decreased LV function     Stable cardiac wise. Will repeat echo. Last echo 2016 with EF 40%.     Discussed use, benefit, and side effects of prescribed medications. All patient questions answered. Pt voiced understanding. Instructed to continue current medications, diet and exercise. Continue risk factor modification and medical management. Patient agreed with treatment plan. Follow up as directed.     Continue Dr Sincere Oliva current treatment plan  Follow up with Dr Urbano Chamorro as scheduled or sooner if needed

## 2019-04-19 ENCOUNTER — HOSPITAL ENCOUNTER (OUTPATIENT)
Dept: NON INVASIVE DIAGNOSTICS | Age: 84
Discharge: HOME | End: 2019-04-19
Payer: MEDICARE

## 2019-04-19 DIAGNOSIS — R93.1 DECREASED CARDIAC EJECTION FRACTION: ICD-10-CM

## 2019-04-19 DIAGNOSIS — I25.5 ISCHEMIC CARDIOMYOPATHY: ICD-10-CM

## 2019-04-19 LAB
LV EF: 43 %
LVEF MODALITY: NORMAL

## 2019-04-19 PROCEDURE — 93306 TTE W/DOPPLER COMPLETE: CPT

## 2019-06-12 ENCOUNTER — HOSPITAL ENCOUNTER (OUTPATIENT)
Age: 84
Discharge: HOME | End: 2019-06-12
Payer: MEDICARE

## 2019-06-12 ENCOUNTER — HOSPITAL ENCOUNTER (OUTPATIENT)
Dept: GENERAL RADIOLOGY | Age: 84
Discharge: HOME | End: 2019-06-12
Payer: MEDICARE

## 2019-06-12 DIAGNOSIS — N20.0 RENAL CALCULUS, BILATERAL: ICD-10-CM

## 2019-06-12 PROCEDURE — 74018 RADEX ABDOMEN 1 VIEW: CPT

## 2019-06-21 ENCOUNTER — OFFICE VISIT (OUTPATIENT)
Dept: UROLOGY | Age: 84
End: 2019-06-21
Payer: MEDICARE

## 2019-06-21 VITALS
WEIGHT: 262 LBS | SYSTOLIC BLOOD PRESSURE: 134 MMHG | HEIGHT: 68 IN | DIASTOLIC BLOOD PRESSURE: 70 MMHG | BODY MASS INDEX: 39.71 KG/M2

## 2019-06-21 DIAGNOSIS — N20.0 CALCULUS OF KIDNEY: Primary | ICD-10-CM

## 2019-06-21 LAB
BILIRUBIN URINE: NEGATIVE
BLOOD URINE, POC: NEGATIVE
CHARACTER, URINE: CLEAR
COLOR, URINE: YELLOW
GLUCOSE URINE: NEGATIVE MG/DL
KETONES, URINE: NEGATIVE
LEUKOCYTE CLUMPS, URINE: NEGATIVE
NITRITE, URINE: NEGATIVE
PH, URINE: 6 (ref 5–9)
PROTEIN, URINE: NEGATIVE MG/DL
SPECIFIC GRAVITY, URINE: 1.01 (ref 1–1.03)
UROBILINOGEN, URINE: 0.2 EU/DL (ref 0–1)

## 2019-06-21 PROCEDURE — 1036F TOBACCO NON-USER: CPT | Performed by: NURSE PRACTITIONER

## 2019-06-21 PROCEDURE — G8417 CALC BMI ABV UP PARAM F/U: HCPCS | Performed by: NURSE PRACTITIONER

## 2019-06-21 PROCEDURE — G8427 DOCREV CUR MEDS BY ELIG CLIN: HCPCS | Performed by: NURSE PRACTITIONER

## 2019-06-21 PROCEDURE — G8598 ASA/ANTIPLAT THER USED: HCPCS | Performed by: NURSE PRACTITIONER

## 2019-06-21 PROCEDURE — 1123F ACP DISCUSS/DSCN MKR DOCD: CPT | Performed by: NURSE PRACTITIONER

## 2019-06-21 PROCEDURE — 4040F PNEUMOC VAC/ADMIN/RCVD: CPT | Performed by: NURSE PRACTITIONER

## 2019-06-21 PROCEDURE — 99213 OFFICE O/P EST LOW 20 MIN: CPT | Performed by: NURSE PRACTITIONER

## 2019-06-21 PROCEDURE — 81003 URINALYSIS AUTO W/O SCOPE: CPT | Performed by: NURSE PRACTITIONER

## 2019-06-21 RX ORDER — POTASSIUM CHLORIDE 1500 MG/1
20 TABLET, FILM COATED, EXTENDED RELEASE ORAL
COMMUNITY
Start: 2019-05-29 | End: 2021-06-01 | Stop reason: SDUPTHER

## 2019-06-21 RX ORDER — ALFUZOSIN HYDROCHLORIDE 10 MG/1
TABLET, EXTENDED RELEASE ORAL
COMMUNITY
Start: 2019-05-31 | End: 2019-10-04 | Stop reason: ALTCHOICE

## 2019-06-21 RX ORDER — MULTIVITAMIN
1 CAPSULE ORAL
COMMUNITY
End: 2019-10-04 | Stop reason: SDUPTHER

## 2019-06-21 NOTE — PROGRESS NOTES
(benign prostatic hyperplasia), CAD (coronary artery disease), Cancer ([facility] [address] CKD (chronic kidney disease) stage 3, GFR 30-59 ml/min ([facility] [address] COPD with exacerbation ([facility] [address] Hyperlipidemia, Hypertension, NSTEMI (non-ST elevated myocardial infarction) ([facility] [address] Obesity (BMI 35.0-39.9 without comorbidity), ARGENIS (obstructive sleep apnea), S/P CABG x 3, S/P cardiac cath: 6/20/2016: Tight LM lesion estimated at 90% along with the ostium of LAD. The ostium of the LCX seems to be significantly narrowed as well. 90% mid-RCA and 100% distal RCA, and TIA (transient ischemic attack). Past Surgical History  The patient  has a past surgical history that includes Cystoscopy (04/20/2017); Cardiac surgery (06/2016); eye surgery (2009?? ); skin biopsy; Mohs surgery (Left, 01/26/2018); and pr outer ear surgery proc unlisted (Left, 1/26/2018). Family History  This patient's family history includes Cancer in his father. Social History  Blayne Mccallum  reports that he quit smoking about 69 years ago. His smoking use included cigarettes. He started smoking about 72 years ago. He has a 7.50 pack-year smoking history. He has never used smokeless tobacco. He reports that he drinks about 1.2 oz of alcohol per week. He reports that he does not use drugs. Review of Systems  No problems with ears, nose or throat. No problems with eyes. No chest pain, shortness of breath, abdominal pain, extremity pain or weakness, and no neurological deficits. No rashes. No swollen glands or lymph nodes.  symptoms per HPI. The remainder of the review of symptoms is negative. Exam  /70   Ht 5' 8\" (1.727 m)   Wt 262 lb (118.8 kg)   BMI 39.84 kg/m²   Nursing note and vitals reviewed. Constitutional: Alert and oriented times 3, no acute distress and cooperative to examination with appropriate mood and affect. HENT:   Head:        Normocephalic and atraumatic.    Mouth/Throat:         Mucous membranes are normal.   Eyes:         EOM are normal. No scleral icterus. PERRLA. Neck:        Supple, symmetrical, trachea midline, no adenopathy, thyroid symmetric, not enlarged and no tenderness. Cardiovascular:        Normal rate, regular rhythm, S1 S2 heart sounds. No murmurs, rub, or gallops. Pulses:       Radial pulses are 2+/4 bilateral and equal. Posterior tibialis 2+/4 bilateral and equal  Pulmonary/Chest:      Chest symmetric with normal A/P diameter,  CTA with no wheezes, rales, or rhonchi noted. Normal respiratory rate and rhthym. No use of accessory muscles. Abdominal:         Soft. No tenderness. No rebound, no guarding and no CVA tenderness. Bowel sounds present. Musculoskeletal:         Normal range of motion. No edema or tenderness of lower extremities. Lymphadenopathy:        No cervical adenopathy. Bilateral supraclavicular adenopathy absent. Extremities: No cyanosis, clubbing, or edema present. Neurological:        Alert and oriented. No cranial nerve deficit. There are no focalizing motor or sensory deficits. CN II-XII are grossly intact. Psychiatric:        Normal mood and affect. Labs   Urine dip demonstrates   No results found for this visit on 06/21/19. Patients recent PSA values are as follows  Lab Results   Component Value Date    PSA 2.04 07/17/2016    PSA 3.09 (H) 06/14/2016        Recent BUN/Creatinine:  Lab Results   Component Value Date    BUN 26 03/23/2017    CREATININE 1.3 03/23/2017          Narrative   PROCEDURE: XR ABDOMEN (KUB) (SINGLE AP VIEW)       CLINICAL INFORMATION: Renal calculus, bilateral.       COMPARISON: 6/21/2018       TECHNIQUE: A supine AP view of the abdomen was obtained.       FINDINGS:        Moderate spondylosis. Mild dextroscoliosis.       Multiple small stones project over the right kidney. Largest measures 3 mm.       Several small stones project over the left kidney.  Largest measures 4 mm.       Multiple pelvic opacities are fairly similar to previous favoring phleboliths.             Impression   Stones again project over each kidney.                       Assessment & Plan  Bilateral nonobstructive renal stones    Pt continues with nonobstructive bilateral renal stones biggest is 4 mm in size. Pt would like to continue to monitor at this time. F/u in 2 years with KUB prior. We discussed general stone prevention measures including increasing water intake to 3-4 liters per day to make 2.5 liters of urine per day, limiting animal protein intake to less than 9 ozs per day, watching sodium intake, and maintaining moderate calcium intake from food and not supplements with goal of 800-1200 mg/day. F/u in 2 years with KUB prior to appt.

## 2019-10-03 PROBLEM — L57.0 ACTINIC KERATOSIS: Status: ACTIVE | Noted: 2019-10-03

## 2019-10-03 PROBLEM — I10 ESSENTIAL (PRIMARY) HYPERTENSION: Status: ACTIVE | Noted: 2019-10-03

## 2019-10-03 PROBLEM — C44.219 BASAL CELL CARCINOMA OF SKIN OF LEFT EAR AND EXTERNAL AURICULAR CANAL: Status: ACTIVE | Noted: 2017-12-14

## 2019-10-04 ENCOUNTER — OFFICE VISIT (OUTPATIENT)
Dept: CARDIOLOGY CLINIC | Age: 84
End: 2019-10-04
Payer: MEDICARE

## 2019-10-04 VITALS
HEIGHT: 68 IN | HEART RATE: 60 BPM | WEIGHT: 260 LBS | SYSTOLIC BLOOD PRESSURE: 136 MMHG | DIASTOLIC BLOOD PRESSURE: 72 MMHG | BODY MASS INDEX: 39.4 KG/M2

## 2019-10-04 DIAGNOSIS — I25.10 CORONARY ARTERY DISEASE DUE TO LIPID RICH PLAQUE: Primary | ICD-10-CM

## 2019-10-04 DIAGNOSIS — I25.83 CORONARY ARTERY DISEASE DUE TO LIPID RICH PLAQUE: Primary | ICD-10-CM

## 2019-10-04 PROCEDURE — 4040F PNEUMOC VAC/ADMIN/RCVD: CPT | Performed by: INTERNAL MEDICINE

## 2019-10-04 PROCEDURE — G8417 CALC BMI ABV UP PARAM F/U: HCPCS | Performed by: INTERNAL MEDICINE

## 2019-10-04 PROCEDURE — 1123F ACP DISCUSS/DSCN MKR DOCD: CPT | Performed by: INTERNAL MEDICINE

## 2019-10-04 PROCEDURE — 99213 OFFICE O/P EST LOW 20 MIN: CPT | Performed by: INTERNAL MEDICINE

## 2019-10-04 PROCEDURE — G8484 FLU IMMUNIZE NO ADMIN: HCPCS | Performed by: INTERNAL MEDICINE

## 2019-10-04 PROCEDURE — G8427 DOCREV CUR MEDS BY ELIG CLIN: HCPCS | Performed by: INTERNAL MEDICINE

## 2019-10-04 PROCEDURE — G8598 ASA/ANTIPLAT THER USED: HCPCS | Performed by: INTERNAL MEDICINE

## 2019-10-04 PROCEDURE — 1036F TOBACCO NON-USER: CPT | Performed by: INTERNAL MEDICINE

## 2019-10-28 RX ORDER — METOPROLOL TARTRATE 50 MG/1
50 TABLET, FILM COATED ORAL 2 TIMES DAILY
Qty: 180 TABLET | Refills: 3 | Status: SHIPPED | OUTPATIENT
Start: 2019-10-28 | End: 2021-01-12

## 2020-06-12 ENCOUNTER — OFFICE VISIT (OUTPATIENT)
Dept: CARDIOLOGY CLINIC | Age: 85
End: 2020-06-12
Payer: MEDICARE

## 2020-06-12 VITALS
SYSTOLIC BLOOD PRESSURE: 126 MMHG | DIASTOLIC BLOOD PRESSURE: 64 MMHG | BODY MASS INDEX: 38.83 KG/M2 | HEART RATE: 62 BPM | HEIGHT: 69 IN | WEIGHT: 262.2 LBS

## 2020-06-12 PROCEDURE — 1036F TOBACCO NON-USER: CPT | Performed by: INTERNAL MEDICINE

## 2020-06-12 PROCEDURE — 4040F PNEUMOC VAC/ADMIN/RCVD: CPT | Performed by: INTERNAL MEDICINE

## 2020-06-12 PROCEDURE — 93000 ELECTROCARDIOGRAM COMPLETE: CPT | Performed by: INTERNAL MEDICINE

## 2020-06-12 PROCEDURE — G8427 DOCREV CUR MEDS BY ELIG CLIN: HCPCS | Performed by: INTERNAL MEDICINE

## 2020-06-12 PROCEDURE — 1123F ACP DISCUSS/DSCN MKR DOCD: CPT | Performed by: INTERNAL MEDICINE

## 2020-06-12 PROCEDURE — 99213 OFFICE O/P EST LOW 20 MIN: CPT | Performed by: INTERNAL MEDICINE

## 2020-06-12 PROCEDURE — G8417 CALC BMI ABV UP PARAM F/U: HCPCS | Performed by: INTERNAL MEDICINE

## 2020-06-12 NOTE — PROGRESS NOTES
[address] [place] [address],[place] [address] [address] [place] [address]  Dept: [phone]  Dept Fax: [phone]  Loc: [phone]      Visit Date: 6/12/2020    Mr. Robert Ellington is a 80 y.o. male  who presented for:  Chief Complaint   Patient presents with    Follow-up    Coronary Artery Disease       HPI:   81 yo M c hx of CAD s/p CABG x3, 2016, hx MI,COPD, HTN, HLD, CHF, TIA is here for a follow up. Denies any chest pain, palpitations, SOB, lightheadedness, dizziness, orthopnea, PND, leg swelling, leg pain.       Current Outpatient Medications:     metoprolol tartrate (LOPRESSOR) 50 MG tablet, Take 1 tablet by mouth 2 times daily, Disp: 180 tablet, Rfl: 3    potassium chloride (KLOR-CON M) 20 MEQ TBCR extended release tablet, Take 20 mEq by mouth, Disp: , Rfl:     Lactobacillus (ACIDOPHILUS/PECTIN PO), Take by mouth, Disp: , Rfl:     aspirin 81 MG tablet, Take 325 mg by mouth daily , Disp: , Rfl:     Multiple Vitamins-Minerals (THERAPEUTIC MULTIVITAMIN-MINERALS) tablet, Take 1 tablet by mouth daily, Disp: , Rfl:     Cholecalciferol (VITAMIN D PO), Take by mouth 2 times daily, Disp: , Rfl:     furosemide (LASIX) 40 MG tablet, Take 1 tablet by mouth daily, Disp: 30 tablet, Rfl: 3    atorvastatin (LIPITOR) 20 MG tablet, Take 1 tablet by mouth nightly, Disp: 30 tablet, Rfl: 3    finasteride (PROSCAR) 5 MG tablet, Take 5 mg by mouth daily, Disp: , Rfl:     Past Medical History  Lex Gallo  has a past medical history of Acute on chronic diastolic CHF (congestive heart failure), NYHA class 2 (HCC), Arthritis, Atrial flutter (HCC), BPH (benign prostatic hyperplasia), CAD (coronary artery disease), Cancer ([facility] [address] CKD (chronic kidney disease) stage 3, GFR 30-59 ml/min ([facility] [address] COPD with exacerbation ([facility] [address] Hyperlipidemia, Hypertension, NSTEMI (non-ST elevated myocardial infarction) ([address] Obesity (BMI 35.0-39.9 without comorbidity), ARGENIS (obstructive sleep apnea), S/P CABG x Component Value Date    INR 0.98 06/01/2017    INR 1.52 (H) 03/20/2017    INR 1.26 (H) 03/19/2017         Lab Results   Component Value Date    LABA1C 5.1 06/15/2016       Lab Results   Component Value Date    TRIG 77 06/20/2016    HDL 48 06/20/2016    LDLCALC 34 06/20/2016       Lab Results   Component Value Date    TSH 5.930 07/16/2016         Testing Reviewed:      I have individually reviewed the below cardiac tests    EKG:     ECHO:   Results for orders placed during the hospital encounter of 04/19/19   ECHO Complete 2D W Doppler W Color    Narrative Transthoracic Echocardiography Report (TTE)     Demographics      Patient Name    Roselyn Beaulieu Gender                Male      MR #            [id number]       Race                                                        Ethnicity      Account #       [de-identified]       Room Number      Accession       [id number]       Date of Study         04/19/2019   Number      Date of Birth   1934      Referring Physician   Mary Kay Rees Bridget, CNP      Age             80 year(s)      Abdelrahman Davis, [facility]                                      Interpreting          Barry Lomas MD                                   Physician     Procedure    Type of Study      TTE procedure:ECHOCARDIOGRAM COMPLETE 2D W DOPPLER W COLOR. Procedure Date  Date: 04/19/2019 Start: 10:42 AM    Study Location: Echo Lab  Technical Quality: Limited visualization due to body habitus. Indications:Cardiomyopathy. Additional Medical History:Chronic kidney disease, NSTEMI, Obstructive sleep  apnea, Coronary artery disease, CABG, Transient ischemic attack,  Hypertension, Hyperlipidemia, Aflutter, Congestive heart failure    Patient Status: Routine    Height: 68 inches Weight: 258.01 pounds BSA: 2.28 m^2 BMI: 39.23 kg/m^2    BP: 138/78 mmHg    Allergies    - No Known Allergies.      Conclusions Summary   Technically difficult examination. Left ventricular size is normal and systolic function is mildly reduced. Ejection fraction was estimated at 40-45%. LV wall thickness is within   normal limits. Trace mitral regurgitation is present. The right ventricular size was normal with normal systolic function and   wall thickness. Trivial tricuspid regurgitation visualized. Signature      ----------------------------------------------------------------   Electronically signed by Geetha Brand MD (Interpreting   physician) on 04/19/2019 at 05:13 PM   ----------------------------------------------------------------      Findings      Mitral Valve   The mitral valve was not well visualized . Trace mitral regurgitation is present. Aortic Valve   The aortic valve appears trileaflet with normal thickness and leaflet   excursion. DOPPLER: Transaortic velocity was within the normal range with   no evidence of aortic stenosis. There was no evidence of aortic   regurgitation. Tricuspid Valve   Tricuspid valve was not well visualized. Trivial tricuspid regurgitation visualized. Pulmonic Valve   The pulmonic valve was not well visualized . Left Atrium   Left atrial size was normal.      Left Ventricle   Left ventricular size is normal and systolic function is mildly reduced. Ejection fraction was estimated at 40-45%. LV wall thickness is within   normal limits. Right Atrium   Right atrial size was normal.      Right Ventricle   The right ventricular size was normal with normal systolic function and   wall thickness. Pericardial Effusion   The pericardium was normal in appearance with no evidence of a pericardial   effusion. Pleural Effusion   No evidence of pleural effusion. Aorta / Great Vessels   -Aortic root dimension within normal limits.   -The Pulmonary artery is within normal limits.    -IVC size is within normal limits with normal respiratory phasic

## 2020-06-12 NOTE — PROGRESS NOTES
8 month follow up. EKG done today. Denies cp, sob, palpitations, dizziness, lightheaded and JENNIFER.

## 2020-11-03 ENCOUNTER — HOSPITAL ENCOUNTER (OUTPATIENT)
Dept: GENERAL RADIOLOGY | Age: 85
Discharge: HOME | DRG: 178 | End: 2020-11-03
Payer: MEDICARE

## 2020-11-03 ENCOUNTER — HOSPITAL ENCOUNTER (OUTPATIENT)
Age: 85
Discharge: HOME | DRG: 178 | End: 2020-11-03
Payer: MEDICARE

## 2020-11-03 LAB
ANION GAP SERPL CALCULATED.3IONS-SCNC: 12 MEQ/L (ref 8–16)
BUN BLDV-MCNC: 24 MG/DL (ref 7–22)
CALCIUM SERPL-MCNC: 8.7 MG/DL (ref 8.5–10.5)
CHLORIDE BLD-SCNC: 105 MEQ/L (ref 98–111)
CO2: 23 MEQ/L (ref 23–33)
CREAT SERPL-MCNC: 1.2 MG/DL (ref 0.4–1.2)
ERYTHROCYTE [DISTWIDTH] IN BLOOD BY AUTOMATED COUNT: 13.3 % (ref 11.5–14.5)
ERYTHROCYTE [DISTWIDTH] IN BLOOD BY AUTOMATED COUNT: 50.3 FL (ref 35–45)
GFR SERPL CREATININE-BSD FRML MDRD: 57 ML/MIN/1.73M2
GLUCOSE BLD-MCNC: 102 MG/DL (ref 70–108)
HCT VFR BLD CALC: 38.6 % (ref 42–52)
HEMOGLOBIN: 12.4 GM/DL (ref 14–18)
MCH RBC QN AUTO: 32.5 PG (ref 26–33)
MCHC RBC AUTO-ENTMCNC: 32.1 GM/DL (ref 32.2–35.5)
MCV RBC AUTO: 101.3 FL (ref 80–94)
PLATELET # BLD: 184 THOU/MM3 (ref 130–400)
PMV BLD AUTO: 10.1 FL (ref 9.4–12.4)
POTASSIUM SERPL-SCNC: 4.8 MEQ/L (ref 3.5–5.2)
RBC # BLD: 3.81 MILL/MM3 (ref 4.7–6.1)
SODIUM BLD-SCNC: 140 MEQ/L (ref 135–145)
WBC # BLD: 4.8 THOU/MM3 (ref 4.8–10.8)

## 2020-11-03 PROCEDURE — 36415 COLL VENOUS BLD VENIPUNCTURE: CPT

## 2020-11-03 PROCEDURE — 85027 COMPLETE CBC AUTOMATED: CPT

## 2020-11-03 PROCEDURE — 80048 BASIC METABOLIC PNL TOTAL CA: CPT

## 2020-11-03 PROCEDURE — 71046 X-RAY EXAM CHEST 2 VIEWS: CPT

## 2020-11-04 ENCOUNTER — APPOINTMENT (OUTPATIENT)
Dept: GENERAL RADIOLOGY | Age: 85
DRG: 178 | End: 2020-11-04
Attending: FAMILY MEDICINE
Payer: MEDICARE

## 2020-11-04 ENCOUNTER — HOSPITAL ENCOUNTER (INPATIENT)
Age: 85
LOS: 2 days | Discharge: NURSING, REHAB OR LONG-TERM CARE | DRG: 178 | End: 2020-11-06
Attending: INTERNAL MEDICINE | Admitting: INTERNAL MEDICINE
Payer: MEDICARE

## 2020-11-04 PROBLEM — U07.1 COVID-19: Status: ACTIVE | Noted: 2020-11-04

## 2020-11-04 LAB
ALBUMIN SERPL-MCNC: 3.4 G/DL (ref 3.5–5.1)
ALP BLD-CCNC: 69 U/L (ref 38–126)
ALT SERPL-CCNC: 24 U/L (ref 11–66)
ANION GAP SERPL CALCULATED.3IONS-SCNC: 11 MEQ/L (ref 8–16)
AST SERPL-CCNC: 38 U/L (ref 5–40)
BACTERIA: ABNORMAL /HPF
BASOPHILS # BLD: 0.2 %
BASOPHILS # BLD: 0.3 %
BASOPHILS ABSOLUTE: 0 THOU/MM3 (ref 0–0.1)
BASOPHILS ABSOLUTE: 0 THOU/MM3 (ref 0–0.1)
BILIRUB SERPL-MCNC: 0.4 MG/DL (ref 0.3–1.2)
BILIRUBIN URINE: NEGATIVE
BLOOD, URINE: NEGATIVE
BUN BLDV-MCNC: 27 MG/DL (ref 7–22)
C-REACTIVE PROTEIN: 2.73 MG/DL (ref 0–1)
CALCIUM SERPL-MCNC: 8.3 MG/DL (ref 8.5–10.5)
CASTS 2: ABNORMAL /LPF
CASTS UA: ABNORMAL /LPF
CHARACTER, URINE: CLEAR
CHLORIDE BLD-SCNC: 105 MEQ/L (ref 98–111)
CO2: 25 MEQ/L (ref 23–33)
COLOR: YELLOW
CREAT SERPL-MCNC: 1.3 MG/DL (ref 0.4–1.2)
CRYSTALS, UA: ABNORMAL
D-DIMER QUANTITATIVE: 1010 NG/ML FEU (ref 0–500)
EKG ATRIAL RATE: 88 BPM
EKG P AXIS: 26 DEGREES
EKG P-R INTERVAL: 142 MS
EKG Q-T INTERVAL: 396 MS
EKG QRS DURATION: 138 MS
EKG QTC CALCULATION (BAZETT): 479 MS
EKG R AXIS: -59 DEGREES
EKG T AXIS: 55 DEGREES
EKG VENTRICULAR RATE: 88 BPM
EOSINOPHIL # BLD: 0 %
EOSINOPHIL # BLD: 0.3 %
EOSINOPHILS ABSOLUTE: 0 THOU/MM3 (ref 0–0.4)
EOSINOPHILS ABSOLUTE: 0 THOU/MM3 (ref 0–0.4)
EPITHELIAL CELLS, UA: ABNORMAL /HPF
ERYTHROCYTE [DISTWIDTH] IN BLOOD BY AUTOMATED COUNT: 13.1 % (ref 11.5–14.5)
ERYTHROCYTE [DISTWIDTH] IN BLOOD BY AUTOMATED COUNT: 13.2 % (ref 11.5–14.5)
ERYTHROCYTE [DISTWIDTH] IN BLOOD BY AUTOMATED COUNT: 48.8 FL (ref 35–45)
ERYTHROCYTE [DISTWIDTH] IN BLOOD BY AUTOMATED COUNT: 49.1 FL (ref 35–45)
FLU A ANTIGEN: NEGATIVE
FLU B ANTIGEN: NEGATIVE
GFR SERPL CREATININE-BSD FRML MDRD: 52 ML/MIN/1.73M2
GLUCOSE BLD-MCNC: 104 MG/DL (ref 70–108)
GLUCOSE URINE: NEGATIVE MG/DL
HCT VFR BLD CALC: 37.3 % (ref 42–52)
HCT VFR BLD CALC: 38.2 % (ref 42–52)
HEMOGLOBIN: 12.1 GM/DL (ref 14–18)
HEMOGLOBIN: 12.5 GM/DL (ref 14–18)
IMMATURE GRANS (ABS): 0.01 THOU/MM3 (ref 0–0.07)
IMMATURE GRANS (ABS): 0.01 THOU/MM3 (ref 0–0.07)
IMMATURE GRANULOCYTES: 0.2 %
IMMATURE GRANULOCYTES: 0.3 %
KETONES, URINE: NEGATIVE
LACTIC ACID: 1.4 MMOL/L (ref 0.5–2.2)
LEUKOCYTE ESTERASE, URINE: ABNORMAL
LYMPHOCYTES # BLD: 21.4 %
LYMPHOCYTES # BLD: 9.5 %
LYMPHOCYTES ABSOLUTE: 0.4 THOU/MM3 (ref 1–4.8)
LYMPHOCYTES ABSOLUTE: 0.8 THOU/MM3 (ref 1–4.8)
MAGNESIUM: 2.1 MG/DL (ref 1.6–2.4)
MCH RBC QN AUTO: 32.7 PG (ref 26–33)
MCH RBC QN AUTO: 32.9 PG (ref 26–33)
MCHC RBC AUTO-ENTMCNC: 32.4 GM/DL (ref 32.2–35.5)
MCHC RBC AUTO-ENTMCNC: 32.7 GM/DL (ref 32.2–35.5)
MCV RBC AUTO: 100.5 FL (ref 80–94)
MCV RBC AUTO: 100.8 FL (ref 80–94)
MISCELLANEOUS 2: ABNORMAL
MONOCYTES # BLD: 3.2 %
MONOCYTES # BLD: 7.9 %
MONOCYTES ABSOLUTE: 0.2 THOU/MM3 (ref 0.4–1.3)
MONOCYTES ABSOLUTE: 0.3 THOU/MM3 (ref 0.4–1.3)
NITRITE, URINE: NEGATIVE
NUCLEATED RED BLOOD CELLS: 0 /100 WBC
NUCLEATED RED BLOOD CELLS: 0 /100 WBC
OSMOLALITY CALCULATION: 286.7 MOSMOL/KG (ref 275–300)
PH UA: 5 (ref 5–9)
PLATELET # BLD: 173 THOU/MM3 (ref 130–400)
PLATELET # BLD: 175 THOU/MM3 (ref 130–400)
PMV BLD AUTO: 9.6 FL (ref 9.4–12.4)
PMV BLD AUTO: 9.6 FL (ref 9.4–12.4)
POTASSIUM SERPL-SCNC: 4.1 MEQ/L (ref 3.5–5.2)
PRO-BNP: 897.5 PG/ML (ref 0–1800)
PROCALCITONIN: 0.11 NG/ML (ref 0.01–0.09)
PROCALCITONIN: 0.12 NG/ML (ref 0.01–0.09)
PROTEIN UA: 30
RBC # BLD: 3.7 MILL/MM3 (ref 4.7–6.1)
RBC # BLD: 3.8 MILL/MM3 (ref 4.7–6.1)
RBC URINE: ABNORMAL /HPF
RENAL EPITHELIAL, UA: ABNORMAL
SARS-COV-2, NAAT: DETECTED
SEG NEUTROPHILS: 69.8 %
SEG NEUTROPHILS: 86.9 %
SEGMENTED NEUTROPHILS ABSOLUTE COUNT: 2.6 THOU/MM3 (ref 1.8–7.7)
SEGMENTED NEUTROPHILS ABSOLUTE COUNT: 4.1 THOU/MM3 (ref 1.8–7.7)
SODIUM BLD-SCNC: 141 MEQ/L (ref 135–145)
SPECIFIC GRAVITY, URINE: 1.02 (ref 1–1.03)
TOTAL PROTEIN: 6.9 G/DL (ref 6.1–8)
TROPONIN T: < 0.01 NG/ML
TSH SERPL DL<=0.05 MIU/L-ACNC: 2.34 UIU/ML (ref 0.4–4.2)
UROBILINOGEN, URINE: 0.2 EU/DL (ref 0–1)
WBC # BLD: 3.7 THOU/MM3 (ref 4.8–10.8)
WBC # BLD: 4.7 THOU/MM3 (ref 4.8–10.8)
WBC UA: ABNORMAL /HPF
YEAST: ABNORMAL

## 2020-11-04 PROCEDURE — 2060000000 HC ICU INTERMEDIATE R&B

## 2020-11-04 PROCEDURE — 6370000000 HC RX 637 (ALT 250 FOR IP): Performed by: INTERNAL MEDICINE

## 2020-11-04 PROCEDURE — 83615 LACTATE (LD) (LDH) ENZYME: CPT

## 2020-11-04 PROCEDURE — 81001 URINALYSIS AUTO W/SCOPE: CPT

## 2020-11-04 PROCEDURE — 83605 ASSAY OF LACTIC ACID: CPT

## 2020-11-04 PROCEDURE — U0002 COVID-19 LAB TEST NON-CDC: HCPCS

## 2020-11-04 PROCEDURE — 85379 FIBRIN DEGRADATION QUANT: CPT

## 2020-11-04 PROCEDURE — 83735 ASSAY OF MAGNESIUM: CPT

## 2020-11-04 PROCEDURE — 99223 1ST HOSP IP/OBS HIGH 75: CPT | Performed by: INTERNAL MEDICINE

## 2020-11-04 PROCEDURE — 99283 EMERGENCY DEPT VISIT LOW MDM: CPT

## 2020-11-04 PROCEDURE — 93005 ELECTROCARDIOGRAM TRACING: CPT | Performed by: INTERNAL MEDICINE

## 2020-11-04 PROCEDURE — 6360000002 HC RX W HCPCS: Performed by: EMERGENCY MEDICINE

## 2020-11-04 PROCEDURE — 2580000003 HC RX 258: Performed by: INTERNAL MEDICINE

## 2020-11-04 PROCEDURE — 36415 COLL VENOUS BLD VENIPUNCTURE: CPT

## 2020-11-04 PROCEDURE — 80053 COMPREHEN METABOLIC PANEL: CPT

## 2020-11-04 PROCEDURE — 94760 N-INVAS EAR/PLS OXIMETRY 1: CPT

## 2020-11-04 PROCEDURE — 87804 INFLUENZA ASSAY W/OPTIC: CPT

## 2020-11-04 PROCEDURE — 93010 ELECTROCARDIOGRAM REPORT: CPT | Performed by: INTERNAL MEDICINE

## 2020-11-04 PROCEDURE — 85025 COMPLETE CBC W/AUTO DIFF WBC: CPT

## 2020-11-04 PROCEDURE — 84443 ASSAY THYROID STIM HORMONE: CPT

## 2020-11-04 PROCEDURE — 84484 ASSAY OF TROPONIN QUANT: CPT

## 2020-11-04 PROCEDURE — 82728 ASSAY OF FERRITIN: CPT

## 2020-11-04 PROCEDURE — 6360000002 HC RX W HCPCS: Performed by: INTERNAL MEDICINE

## 2020-11-04 PROCEDURE — 83880 ASSAY OF NATRIURETIC PEPTIDE: CPT

## 2020-11-04 PROCEDURE — 84145 PROCALCITONIN (PCT): CPT

## 2020-11-04 PROCEDURE — 86140 C-REACTIVE PROTEIN: CPT

## 2020-11-04 PROCEDURE — 71045 X-RAY EXAM CHEST 1 VIEW: CPT

## 2020-11-04 RX ORDER — ONDANSETRON 2 MG/ML
4 INJECTION INTRAMUSCULAR; INTRAVENOUS EVERY 6 HOURS PRN
Status: DISCONTINUED | OUTPATIENT
Start: 2020-11-04 | End: 2020-11-07 | Stop reason: HOSPADM

## 2020-11-04 RX ORDER — ACETAMINOPHEN 325 MG/1
650 TABLET ORAL EVERY 6 HOURS PRN
Status: DISCONTINUED | OUTPATIENT
Start: 2020-11-04 | End: 2020-11-07 | Stop reason: HOSPADM

## 2020-11-04 RX ORDER — FUROSEMIDE 40 MG/1
40 TABLET ORAL DAILY
Status: DISCONTINUED | OUTPATIENT
Start: 2020-11-04 | End: 2020-11-07 | Stop reason: HOSPADM

## 2020-11-04 RX ORDER — PROMETHAZINE HYDROCHLORIDE 25 MG/1
12.5 TABLET ORAL EVERY 6 HOURS PRN
Status: DISCONTINUED | OUTPATIENT
Start: 2020-11-04 | End: 2020-11-07 | Stop reason: HOSPADM

## 2020-11-04 RX ORDER — ASPIRIN 325 MG
325 TABLET ORAL DAILY
Status: DISCONTINUED | OUTPATIENT
Start: 2020-11-04 | End: 2020-11-07 | Stop reason: HOSPADM

## 2020-11-04 RX ORDER — SODIUM CHLORIDE 0.9 % (FLUSH) 0.9 %
10 SYRINGE (ML) INJECTION EVERY 12 HOURS SCHEDULED
Status: DISCONTINUED | OUTPATIENT
Start: 2020-11-04 | End: 2020-11-07 | Stop reason: HOSPADM

## 2020-11-04 RX ORDER — FINASTERIDE 5 MG/1
5 TABLET, FILM COATED ORAL DAILY
Status: DISCONTINUED | OUTPATIENT
Start: 2020-11-04 | End: 2020-11-07 | Stop reason: HOSPADM

## 2020-11-04 RX ORDER — ATORVASTATIN CALCIUM 20 MG/1
20 TABLET, FILM COATED ORAL NIGHTLY
Status: DISCONTINUED | OUTPATIENT
Start: 2020-11-04 | End: 2020-11-07 | Stop reason: HOSPADM

## 2020-11-04 RX ORDER — POTASSIUM CHLORIDE 750 MG/1
20 TABLET, FILM COATED, EXTENDED RELEASE ORAL DAILY
Status: DISCONTINUED | OUTPATIENT
Start: 2020-11-04 | End: 2020-11-07 | Stop reason: HOSPADM

## 2020-11-04 RX ORDER — SODIUM CHLORIDE 0.9 % (FLUSH) 0.9 %
10 SYRINGE (ML) INJECTION PRN
Status: DISCONTINUED | OUTPATIENT
Start: 2020-11-04 | End: 2020-11-07 | Stop reason: HOSPADM

## 2020-11-04 RX ORDER — ACETAMINOPHEN 650 MG/1
650 SUPPOSITORY RECTAL EVERY 6 HOURS PRN
Status: DISCONTINUED | OUTPATIENT
Start: 2020-11-04 | End: 2020-11-07 | Stop reason: HOSPADM

## 2020-11-04 RX ORDER — SODIUM CHLORIDE 9 MG/ML
INJECTION, SOLUTION INTRAVENOUS CONTINUOUS
Status: DISCONTINUED | OUTPATIENT
Start: 2020-11-04 | End: 2020-11-05

## 2020-11-04 RX ORDER — METOPROLOL TARTRATE 50 MG/1
50 TABLET, FILM COATED ORAL 2 TIMES DAILY
Status: DISCONTINUED | OUTPATIENT
Start: 2020-11-04 | End: 2020-11-07 | Stop reason: HOSPADM

## 2020-11-04 RX ORDER — POLYETHYLENE GLYCOL 3350 17 G/17G
17 POWDER, FOR SOLUTION ORAL DAILY PRN
Status: DISCONTINUED | OUTPATIENT
Start: 2020-11-04 | End: 2020-11-07 | Stop reason: HOSPADM

## 2020-11-04 RX ORDER — LACTOBACILLUS RHAMNOSUS GG 10B CELL
1 CAPSULE ORAL DAILY
Status: DISCONTINUED | OUTPATIENT
Start: 2020-11-04 | End: 2020-11-07 | Stop reason: HOSPADM

## 2020-11-04 RX ORDER — DEXAMETHASONE SODIUM PHOSPHATE 4 MG/ML
6 INJECTION, SOLUTION INTRA-ARTICULAR; INTRALESIONAL; INTRAMUSCULAR; INTRAVENOUS; SOFT TISSUE ONCE
Status: COMPLETED | OUTPATIENT
Start: 2020-11-04 | End: 2020-11-04

## 2020-11-04 RX ADMIN — Medication 1 CAPSULE: at 21:42

## 2020-11-04 RX ADMIN — POTASSIUM CHLORIDE 20 MEQ: 750 TABLET, FILM COATED, EXTENDED RELEASE ORAL at 20:43

## 2020-11-04 RX ADMIN — DEXAMETHASONE SODIUM PHOSPHATE 6 MG: 4 INJECTION, SOLUTION INTRAMUSCULAR; INTRAVENOUS at 17:31

## 2020-11-04 RX ADMIN — ASPIRIN 325 MG: 325 TABLET, FILM COATED ORAL at 20:43

## 2020-11-04 RX ADMIN — Medication 10 ML: at 20:43

## 2020-11-04 RX ADMIN — SODIUM CHLORIDE: 9 INJECTION, SOLUTION INTRAVENOUS at 20:43

## 2020-11-04 RX ADMIN — ATORVASTATIN CALCIUM 20 MG: 20 TABLET, FILM COATED ORAL at 20:44

## 2020-11-04 RX ADMIN — ENOXAPARIN SODIUM 30 MG: 30 INJECTION SUBCUTANEOUS at 20:43

## 2020-11-04 RX ADMIN — FINASTERIDE 5 MG: 5 TABLET, FILM COATED ORAL at 20:44

## 2020-11-04 RX ADMIN — FUROSEMIDE 40 MG: 40 TABLET ORAL at 20:44

## 2020-11-04 RX ADMIN — METOPROLOL TARTRATE 50 MG: 50 TABLET, FILM COATED ORAL at 20:43

## 2020-11-04 ASSESSMENT — ENCOUNTER SYMPTOMS
COLOR CHANGE: 0
COUGH: 1
NAUSEA: 0
ABDOMINAL PAIN: 0
DIARRHEA: 0
SHORTNESS OF BREATH: 1
VOMITING: 0

## 2020-11-04 ASSESSMENT — PAIN SCALES - GENERAL
PAINLEVEL_OUTOF10: 0

## 2020-11-04 NOTE — H&P
CARDIAC SURGERY  06/2016    triple by pass    CYSTOSCOPY  04/20/2017    Right Ureteroscopy, Basket Retrieval of Stones, Stent - Dr. Warden Gallagher  2009??    bilateral cataracts    MOHS SURGERY Left 01/26/2018    Mohs Repair BCC Left [place]     HI OUTER EAR SURGERY PROC UNLISTED Left 1/26/2018    MOHS REPAIR BCC LEFT EARLOBE performed by Mynor Ramos MD at [facility]         Medications Prior to Admission:      Prior to Admission medications    Medication Sig Start Date End Date Taking? Authorizing Provider   metoprolol tartrate (LOPRESSOR) 50 MG tablet Take 1 tablet by mouth 2 times daily 10/28/19   Oneil Campa MD   potassium chloride (KLOR-CON M) 20 MEQ TBCR extended release tablet Take 20 mEq by mouth 5/29/19   Historical Provider, MD   Lactobacillus (ACIDOPHILUS/PECTIN PO) Take by mouth    Historical Provider, MD   aspirin 81 MG tablet Take 325 mg by mouth daily     Historical Provider, MD   Multiple Vitamins-Minerals (THERAPEUTIC MULTIVITAMIN-MINERALS) tablet Take 1 tablet by mouth daily    Historical Provider, MD   Cholecalciferol (VITAMIN D PO) Take by mouth 2 times daily    Historical Provider, MD   furosemide (LASIX) 40 MG tablet Take 1 tablet by mouth daily 7/29/16   Eloisa De La Torre MD   atorvastatin (LIPITOR) 20 MG tablet Take 1 tablet by mouth nightly 7/15/16   Delia Jiang MD   finasteride (PROSCAR) 5 MG tablet Take 5 mg by mouth daily    Historical Provider, MD       Allergies:  Patient has no known allergies. Social History:      The patient currently lives with wife    TOBACCO:   reports that he quit smoking about 70 years ago. His smoking use included cigarettes. He started smoking about 74 years ago. He has a 7.50 pack-year smoking history. He has never used smokeless tobacco.  ETOH:   reports current alcohol use of about 2.0 standard drinks of alcohol per week. Family History:    .  Positive as follows:        Problem Relation Age of Onset    Cancer Father         LUNG       Diet:  No diet orders on file    REVIEW OF SYSTEMS:   Pertinent positives as noted in the HPI. All other systems reviewed and negative. PHYSICAL EXAM:    /68   Pulse 95   Temp 99.4 °F (37.4 °C) (Oral)   Resp 20   SpO2 95%     General appearance: Chronically ill appearing    HEENT:  Normal cephalic, atraumatic without obvious deformity. Pupils equal, round, and reactive to light. Extra ocular muscles intact. Conjunctivae/corneas clear. Neck: Supple, with full range of motion. No jugular venous distention. Trachea midline. Respiratory:  Normal respiratory effort. Clear to auscultation, bilaterally without Rales/Wheezes/Rhonchi. Cardiovascular:  Regular rate and rhythm with normal S1/S2 without murmurs, rubs or gallops. Abdomen: Soft, non-tender, non-distended with normal bowel sounds. protuberant  Musculoskeletal:  Legs somewhat puffy, woody  Skin: Skin color, texture, turgor normal.  No rashes or lesions. Neurologic:  Neurovascularly intact without any focal sensory/motor deficits. Cranial nerves: II-XII intact, grossly non-focal.  Psychiatric:  Alert and oriented, thought content appropriate, normal insight    Labs:     Recent Labs     11/03/20  1110 11/04/20  1405   WBC 4.8 3.7*   HGB 12.4* 12.5*   HCT 38.6* 38.2*    173     Recent Labs     11/03/20  1110 11/04/20  1405    141   K 4.8 4.1    105   CO2 23 25   BUN 24* 27*   CREATININE 1.2 1.3*   CALCIUM 8.7 8.3*     Recent Labs     11/04/20  1405   AST 38   ALT 24   BILITOT 0.4   ALKPHOS 69     No results for input(s): INR in the last 72 hours. No results for input(s): Wendy Beat in the last 72 hours.     Urinalysis:      Lab Results   Component Value Date    NITRU NEGATIVE 11/04/2020    WBCUA 2-4 11/04/2020    BACTERIA NONE SEEN 11/04/2020    RBCUA 0-2 11/04/2020    BLOODU NEGATIVE 11/04/2020    GLUCOSEU NEGATIVE 11/04/2020       Intake & Output:  No intake/output data recorded. No intake/output data recorded. Radiology:     CXR: I have reviewed the CXR with the following interpretation: no acute infiltrates  EKG:  I have reviewed the EKG with the following interpretation: nsr, left axis, RBBB    XR CHEST PORTABLE   Final Result   1. Normal heart size. Metallic sternotomy sutures from prior surgery. 2. No acute findings. No infiltrates or effusions are seen            **This report has been created using voice recognition software. It may contain minor errors which are inherent in voice recognition technology. **      Final report electronically signed by Dr. Sugar Del Cid on 11/4/2020 2:30 PM           DVT prophylaxis: LOVENOX PER COVID PROTOCOL    Code Status: Prior      PT/OT Eval Status:  ordered    Disposition:Home? Active Hospital Problems    Diagnosis Date Noted    COVID-19 [U07.1] 11/04/2020    Coronary artery disease involving coronary bypass graft of native heart without angina pectoris [I25.810] 03/19/2017    CKD (chronic kidney disease) stage 3, GFR 30-59 ml/min [N18.30] 06/20/2016    S/P cardiac cath: 6/20/2016: Tight LM lesion estimated at 90% along with the ostium of LAD. The ostium of the LCX seems to be significantly narrowed as well. 90% mid-RCA and 100% distal RCA [Z98.890] 06/20/2016    Obstructive sleep apnea [G47.33]        PLAN:    1. COVID 19- admit, observe for hypoxia or complications  2. Debility- PT. Thank you PEPITO [place] - ZAKIYA for the opportunity to be involved in this patient's care.     Electronically signed by Lamont Estes MD on 11/4/2020 at 5:56 PM

## 2020-11-04 NOTE — ED NOTES
Ambulation trial  Complete with Pulse oximeter. Pt SpO2 remained at 95-99% on room air.  Pt reported some SOB after the trial.      Kar Starr RN  11/04/20 6545

## 2020-11-04 NOTE — ACP (ADVANCE CARE PLANNING)
Advance Care Planning     Advance Care Planning Activator (Inpatient)  Conversation Note      Date of ACP Conversation: 11/4/2020    Conversation Conducted with: Patient with Decision Making Capacity    ACP Activator: Nolan Decision Maker:     Current Designated Health Care Decision Maker:   Primary Decision Maker: Fidelia [facility] - [phone]    Secondary Decision Maker: Boni Good - Child - [phone]    Supplemental (Other) Decision Maker: Rosmery Franklin - Child - [phone]      Care Preferences    Ventilation: \"If you were in your present state of health and suddenly became very ill and were unable to breathe on your own, what would your preference be about the use of a ventilator (breathing machine) if it were available to you? \"      Would the patient desire the use of ventilator (breathing machine)?: yes    \"If your health worsens and it becomes clear that your chance of recovery is unlikely, what would your preference be about the use of a ventilator (breathing machine) if it were available to you? \"     Would the patient desire the use of ventilator (breathing machine)?: No      Resuscitation  \"CPR works best to restart the heart when there is a sudden event, like a heart attack, in someone who is otherwise healthy. Unfortunately, CPR does not typically restart the heart for people who have serious health conditions or who are very sick. \"    \"In the event your heart stopped as a result of an underlying serious health condition, would you want attempts to be made to restart your heart (answer \"yes\" for attempt to resuscitate) or would you prefer a natural death (answer \"no\" for do not attempt to resuscitate)? \" yes     [x] Yes   [] No   Educated Patient / Vilas Leys regarding differences between Advance Directives and portable DNR orders.     Length of ACP Conversation in minutes:  45  Conversation Outcomes:  [x] ACP discussion completed  [] Existing advance directive reviewed with patient; no changes to patient's previously recorded wishes  [] New Advance Directive completed  [] Portable Do Not Rescitate prepared for Provider review and signature  [] POLST/POST/MOLST/MOST prepared for Provider review and signature      Follow-up plan:    [] Schedule follow-up conversation to continue planning  [x] Referred individual to Provider for additional questions/concerns   [x] Advised patient/agent/surrogate to review completed ACP document and update if needed with changes in condition, patient preferences or care setting    [] This note routed to one or more involved healthcare providers      - The patient was resting in his bed with his wife, son and daughter all within the area. The patient admitted it was ok to admit him if necessary. He also confirmed his emergency contacts according to those recorded on his AD. The patient asked that we discontinue the existing DNR-CCA (no intubation) that we have on file. This information was relayed to his hospitalist who was asked to encourage the patient to discuss with his PCP as well. Apparently the choice was made a couple years ago when his health was much worse. - There are no further follow-ups needed at this time.

## 2020-11-04 NOTE — ED PROVIDER NOTES
[facility] [address] COMPLAINT       Chief Complaint   Patient presents with    Fatigue    Cough       Nurses Notes reviewed and I agree except as noted in the HPI. HISTORY OF PRESENT ILLNESS    Juliocesar Alvarez is a 80 y.o. male who presents to the Emergency Department for the evaluation of progressive weakness, fatigue, cough. Symptoms have been developing over the past 4 to 5 days. The patient states that he has \"no pep in his step\". He states when he gets up and walks to the restroom he can barely make it back because he is so fatigued. Patient has occasional cough. He does become dyspneic when he is walking across the room. Wife states that she has been taking his temperature every day. Patient did have a 99.5 and a 100.0 temperature yesterday. Normal temperature today. The patient did call his primary care provider yesterday. He had an outpatient chest x-ray which showed no acute findings. He was placed on doxycycline. The patient was asked if he was concerned about COVID-19 and he advised that he did not think he was exposed to because he has not been anywhere. He declined testing. Patient denies any significant weight gain. He has chronic leg swelling but no acute leg swelling. Denies any dark or tarry stools. Patient is on Plavix but no anticoagulants. Denies any diarrhea, nausea or vomiting, no decreased urine output or urinary symptoms such as frequency or urgency. Patient does have significant past medical history including obesity, CABG 3 vessel bypass, cardiac stents, COPD, atrial flutter, ARGENIS, hyperlipidemia, hypertension      The HPI was provided by the patient. REVIEW OF SYSTEMS     Review of Systems   Constitutional: Positive for activity change, chills, fatigue and fever. Negative for appetite change and diaphoresis. Respiratory: Positive for cough and shortness of breath (with ambulation). Cardiovascular: Positive for leg swelling (chronic). Negative for chest pain. Gastrointestinal: Negative for abdominal pain, diarrhea, nausea and vomiting. Musculoskeletal: Positive for arthralgias. Negative for neck pain and neck stiffness. Skin: Negative for color change. Neurological: Positive for weakness and light-headedness. Negative for dizziness, seizures and headaches. Psychiatric/Behavioral: Negative for behavioral problems. PAST MEDICAL HISTORY    has a past medical history of Acute on chronic diastolic CHF (congestive heart failure), NYHA class 2 ([facility] [address] Arthritis, Atrial flutter ([facility] [address] BPH (benign prostatic hyperplasia), CAD (coronary artery disease), Cancer ([facility] [address] CKD (chronic kidney disease) stage 3, GFR 30-59 ml/min, [facility]ation [facility] Hyperlipidemia, Hypertension, NSTEMI (non-ST elevated myocardial infarction) ([facility] Obesity (BMI 35.0-39.9 without comorbidity), ARGENIS (obstructive sleep apnea), S/P CABG x 3, S/P cardiac cath: 6/20/2016: Tight LM lesion estimated at 90% along with the ostium of LAD. The ostium of the LCX seems to be significantly narrowed as well. 90% mid-RCA and 100% distal RCA, and TIA (transient ischemic attack). SURGICAL HISTORY      has a past surgical history that includes Cystoscopy (04/20/2017); Cardiac surgery (06/2016); eye surgery (2009?? ); skin biopsy; Mohs surgery (Left, 01/26/2018); and pr outer ear surgery proc unlisted (Left, 1/26/2018).     CURRENT MEDICATIONS       Previous Medications    ASPIRIN 81 MG TABLET    Take 325 mg by mouth daily     ATORVASTATIN (LIPITOR) 20 MG TABLET    Take 1 tablet by mouth nightly    CHOLECALCIFEROL (VITAMIN D PO)    Take by mouth 2 times daily    FINASTERIDE (PROSCAR) 5 MG TABLET    Take 5 mg by mouth daily    FUROSEMIDE (LASIX) 40 MG TABLET    Take 1 tablet by mouth daily    LACTOBACILLUS (ACIDOPHILUS/PECTIN PO)    Take by mouth    METOPROLOL TARTRATE (LOPRESSOR) 50 MG TABLET    Take 1 tablet by mouth 2 times daily    MULTIPLE VITAMINS-MINERALS (THERAPEUTIC MULTIVITAMIN-MINERALS) TABLET    Take 1 tablet by mouth daily    POTASSIUM CHLORIDE (KLOR-CON M) 20 MEQ TBCR EXTENDED RELEASE TABLET    Take 20 mEq by mouth       ALLERGIES     has No Known Allergies. FAMILY HISTORY     He indicated that his mother is . He indicated that his father is . family history includes Cancer in his father. SOCIAL HISTORY      reports that he quit smoking about 70 years ago. His smoking use included cigarettes. He started smoking about 74 years ago. He has a 7.50 pack-year smoking history. He has never used smokeless tobacco. He reports current alcohol use of about 2.0 standard drinks of alcohol per week. He reports that he does not use drugs. PHYSICAL EXAM     INITIAL VITALS:  oral temperature is 99.4 °F (37.4 °C). His blood pressure is 135/68 and his pulse is 95. His respiration is 20 and oxygen saturation is 95%. Physical Exam  Constitutional:       General: He is not in acute distress. Appearance: He is obese. He is ill-appearing. He is not toxic-appearing. HENT:      Head: Normocephalic and atraumatic. Nose: Nose normal.   Eyes:      Pupils: Pupils are equal, round, and reactive to light. Cardiovascular:      Rate and Rhythm: Normal rate and regular rhythm. Pulses: Normal pulses. Heart sounds: Normal heart sounds. Pulmonary:      Effort: Pulmonary effort is normal. Tachypnea present. Breath sounds: Decreased breath sounds present. No wheezing or rhonchi. Abdominal:      General: Bowel sounds are normal. There is no distension. Palpations: Abdomen is soft. Tenderness: There is no abdominal tenderness. Musculoskeletal:      Right lower leg: Edema (trace) present. Left lower leg: Edema (trace) present. Skin:     General: Skin is warm and dry. Capillary Refill: Capillary refill takes less than 2 seconds. Coloration: Skin is not jaundiced or pale. ESTIMATED - Abnormal; Notable for the following components:    Est, Glom Filt Rate 52 (*)     All other components within normal limits   URINE WITH REFLEXED MICRO - Abnormal; Notable for the following components:    Protein, UA 30 (*)     Leukocyte Esterase, Urine SMALL (*)     All other components within normal limits   RAPID INFLUENZA A/B ANTIGENS   TROPONIN   MAGNESIUM   TSH WITHOUT REFLEX   BRAIN NATRIURETIC PEPTIDE   ANION GAP   OSMOLALITY   LACTIC ACID, PLASMA       EMERGENCY DEPARTMENT COURSE:   Vitals:    Vitals:    11/04/20 1358 11/04/20 1556 11/04/20 1650   BP: 132/70 (!) 165/69 135/68   Pulse: 84 91 95   Resp: 24 21 20   Temp: 99.4 °F (37.4 °C)     TempSrc: Oral     SpO2: 95% 98% 95%       2:36 PM EST: The patient was seen and evaluated. Appropriate labs and imaging are ordered. Two-view chest x-ray yesterday shows no acute findings. COPD. Repeat chest x-ray today shows no infiltrates  Influenza negative. White blood cell count 3.7. Patient's hemoglobin 12.5, hematocrit 38.2. Patient has mild elevation in creatinine 1.3, BUN 27. Electrolytes essentially normal.  Lactic acid 1.4. Procalcitonin is elevated 0.12.  proBNP 897. Troponin T less than 0.01. Liver enzymes essentially normal.  TSH 2.34. Urinalysis does not appear infected. Influenza negative. Covid is positive  MDM:  The patient is found to be Covid positive today. This is likely the cause of his symptoms. Patient is 80years old and has multiple comorbidities including BMI 38.7, CAD, CKD, COPD, hypertension, ARGENIS. The patient is at great risk for deterioration. If symptoms have been progressing. I advised Dr. Heidi Blackmon of the hospitalist service of the patient's symptoms. He graciously accepted the patient for admission.     CRITICAL CARE:   Non3    CONSULTS:  Dr Heidi Blackmon, hospitalist    PROCEDURES:  None    FINAL IMPRESSION      1. COVID-19 virus infection          DISPOSITION/PLAN   admit    PATIENT REFERRED TO:  No follow-up provider specified. DISCHARGE MEDICATIONS:  New Prescriptions    No medications on file       (Please note that portions of this note were completed with a voice recognition program.  Efforts were made to edit the dictations but occasionally words are mis-transcribed.)    The patient was given an opportunity to see the Emergency Attending. The patient voiced understanding that I was a Mid-LevelProvider and was in agreement with being seen independently by myself.           Aby García, PEPITO - ZAKIYA  11/04/20 3748

## 2020-11-04 NOTE — ED TRIAGE NOTES
Pt presents to the ED from home with complaints of cough and fatigue. Pt went to family doctor yesterday and got blood work and chest xray and was called by doctor to come to ED to be evaluated. Pt blew nose and sounds very congested.

## 2020-11-05 LAB
ALBUMIN SERPL-MCNC: 2.8 G/DL (ref 3.5–5.1)
ALP BLD-CCNC: 59 U/L (ref 38–126)
ALT SERPL-CCNC: 21 U/L (ref 11–66)
ANION GAP SERPL CALCULATED.3IONS-SCNC: 14 MEQ/L (ref 8–16)
AST SERPL-CCNC: 33 U/L (ref 5–40)
BASOPHILS # BLD: 0.3 %
BASOPHILS ABSOLUTE: 0 THOU/MM3 (ref 0–0.1)
BILIRUB SERPL-MCNC: 0.3 MG/DL (ref 0.3–1.2)
BUN BLDV-MCNC: 25 MG/DL (ref 7–22)
CALCIUM SERPL-MCNC: 8.1 MG/DL (ref 8.5–10.5)
CHLORIDE BLD-SCNC: 108 MEQ/L (ref 98–111)
CO2: 21 MEQ/L (ref 23–33)
CREAT SERPL-MCNC: 1.1 MG/DL (ref 0.4–1.2)
D-DIMER QUANTITATIVE: 782 NG/ML FEU (ref 0–500)
EOSINOPHIL # BLD: 0 %
EOSINOPHILS ABSOLUTE: 0 THOU/MM3 (ref 0–0.4)
ERYTHROCYTE [DISTWIDTH] IN BLOOD BY AUTOMATED COUNT: 13.2 % (ref 11.5–14.5)
ERYTHROCYTE [DISTWIDTH] IN BLOOD BY AUTOMATED COUNT: 49.6 FL (ref 35–45)
FERRITIN: 1047 NG/ML (ref 22–322)
FIBRINOGEN: 491 MG/100ML (ref 155–475)
GFR SERPL CREATININE-BSD FRML MDRD: 63 ML/MIN/1.73M2
GLUCOSE BLD-MCNC: 140 MG/DL (ref 70–108)
HCT VFR BLD CALC: 36.5 % (ref 42–52)
HEMOGLOBIN: 11.6 GM/DL (ref 14–18)
IMMATURE GRANS (ABS): 0.01 THOU/MM3 (ref 0–0.07)
IMMATURE GRANULOCYTES: 0.3 %
INR BLD: 1.09 (ref 0.85–1.13)
LD: 277 U/L (ref 100–190)
LYMPHOCYTES # BLD: 15.5 %
LYMPHOCYTES ABSOLUTE: 0.5 THOU/MM3 (ref 1–4.8)
MCH RBC QN AUTO: 32.3 PG (ref 26–33)
MCHC RBC AUTO-ENTMCNC: 31.8 GM/DL (ref 32.2–35.5)
MCV RBC AUTO: 101.7 FL (ref 80–94)
MONOCYTES # BLD: 4.6 %
MONOCYTES ABSOLUTE: 0.1 THOU/MM3 (ref 0.4–1.3)
NUCLEATED RED BLOOD CELLS: 0 /100 WBC
PLATELET # BLD: 167 THOU/MM3 (ref 130–400)
PMV BLD AUTO: 9.8 FL (ref 9.4–12.4)
POTASSIUM REFLEX MAGNESIUM: 4.6 MEQ/L (ref 3.5–5.2)
RBC # BLD: 3.59 MILL/MM3 (ref 4.7–6.1)
SEG NEUTROPHILS: 79.3 %
SEGMENTED NEUTROPHILS ABSOLUTE COUNT: 2.4 THOU/MM3 (ref 1.8–7.7)
SODIUM BLD-SCNC: 143 MEQ/L (ref 135–145)
TOTAL PROTEIN: 6.3 G/DL (ref 6.1–8)
TROPONIN T: < 0.01 NG/ML
WBC # BLD: 3 THOU/MM3 (ref 4.8–10.8)

## 2020-11-05 PROCEDURE — 2060000000 HC ICU INTERMEDIATE R&B

## 2020-11-05 PROCEDURE — 85379 FIBRIN DEGRADATION QUANT: CPT

## 2020-11-05 PROCEDURE — 84484 ASSAY OF TROPONIN QUANT: CPT

## 2020-11-05 PROCEDURE — 6360000002 HC RX W HCPCS: Performed by: INTERNAL MEDICINE

## 2020-11-05 PROCEDURE — 85610 PROTHROMBIN TIME: CPT

## 2020-11-05 PROCEDURE — 97535 SELF CARE MNGMENT TRAINING: CPT

## 2020-11-05 PROCEDURE — 85025 COMPLETE CBC W/AUTO DIFF WBC: CPT

## 2020-11-05 PROCEDURE — 36415 COLL VENOUS BLD VENIPUNCTURE: CPT

## 2020-11-05 PROCEDURE — 94660 CPAP INITIATION&MGMT: CPT

## 2020-11-05 PROCEDURE — 85385 FIBRINOGEN ANTIGEN: CPT

## 2020-11-05 PROCEDURE — 80053 COMPREHEN METABOLIC PANEL: CPT

## 2020-11-05 PROCEDURE — 94761 N-INVAS EAR/PLS OXIMETRY MLT: CPT

## 2020-11-05 PROCEDURE — 97530 THERAPEUTIC ACTIVITIES: CPT

## 2020-11-05 PROCEDURE — 99232 SBSQ HOSP IP/OBS MODERATE 35: CPT | Performed by: INTERNAL MEDICINE

## 2020-11-05 PROCEDURE — 97166 OT EVAL MOD COMPLEX 45 MIN: CPT

## 2020-11-05 PROCEDURE — 6370000000 HC RX 637 (ALT 250 FOR IP): Performed by: INTERNAL MEDICINE

## 2020-11-05 RX ADMIN — ATORVASTATIN CALCIUM 20 MG: 20 TABLET, FILM COATED ORAL at 21:11

## 2020-11-05 RX ADMIN — METOPROLOL TARTRATE 50 MG: 50 TABLET, FILM COATED ORAL at 10:00

## 2020-11-05 RX ADMIN — Medication 1 CAPSULE: at 10:00

## 2020-11-05 RX ADMIN — ASPIRIN 325 MG: 325 TABLET, FILM COATED ORAL at 10:00

## 2020-11-05 RX ADMIN — ENOXAPARIN SODIUM 30 MG: 30 INJECTION SUBCUTANEOUS at 12:17

## 2020-11-05 RX ADMIN — METOPROLOL TARTRATE 50 MG: 50 TABLET, FILM COATED ORAL at 21:11

## 2020-11-05 RX ADMIN — FINASTERIDE 5 MG: 5 TABLET, FILM COATED ORAL at 10:00

## 2020-11-05 RX ADMIN — POTASSIUM CHLORIDE 20 MEQ: 750 TABLET, FILM COATED, EXTENDED RELEASE ORAL at 10:00

## 2020-11-05 RX ADMIN — FUROSEMIDE 40 MG: 40 TABLET ORAL at 10:00

## 2020-11-05 RX ADMIN — ENOXAPARIN SODIUM 30 MG: 30 INJECTION SUBCUTANEOUS at 22:17

## 2020-11-05 ASSESSMENT — PAIN SCALES - GENERAL
PAINLEVEL_OUTOF10: 0

## 2020-11-05 NOTE — PROGRESS NOTES
Andressa Quintanilla 60  INPATIENT OCCUPATIONAL THERAPY  [facility] SURG 8B  EVALUATION    Time:   Time In:   Time Out: 0146  Timed Code Treatment Minutes: 23 Minutes  Minutes: 32    Date: 2020  Patient Name: Demi Madison,   Gender: male      MRN: [id number]  : 1934  (80 y.o.)  Referring Practitioner: Samir Villar MD  Diagnosis: Covid 19  Additional Pertinent Hx: Per EMR, Fina Albert is a 80 y.o. male who presents to the Emergency Department for the evaluation of progressive weakness, fatigue, cough. Symptoms have been developing over the past 4 to 5 days. \"    Restrictions/Precautions:  Restrictions/Precautions: General Precautions, Fall Risk  Position Activity Restriction  Other position/activity restrictions: COVID 19+, -Room air    Subjective  Chart Reviewed: Yes, Orders, Progress Notes, History and Physical  Patient assessed for rehabilitation services?: Yes    Subjective: RN okayed OT session. Upon arrival patient was sitting up in bed. Pt was agreeable to OT session. Pain: Denies     Social/Functional History:  Lives With: Spouse  Type of Home: House  Home Layout: Two level, Performs ADL's on one level, Able to Live on Main level with bedroom/bathroom  Home Access: Stairs to enter with rails  Entrance Stairs - Number of Steps: 1 CHRISTY  Entrance Stairs - Rails: Both  Home Equipment: Rolling walker   Bathroom Shower/Tub: Tub/Shower unit, Shower chair with back  Bathroom Toilet: Handicap height  Bathroom Equipment: Hand-held shower  Bathroom Accessibility: Accessible     ADL Assistance: Independent  Homemaking Assistance: Needs assistance(Spouse completes laundry and cleaning.  Pt reports the eat out alot or kids bring over meals.)  Homemaking Responsibilities: Yes  Ambulation Assistance: Independent  Transfer Assistance: Independent    Active : Yes  Occupation: Retired     VISION:Corrected    HEARING:  WFL    COGNITION: Slow Processing, Decreased Problem Solving and Decreased Education:  OT Education: Plan of Care, OT Role, Precautions, ADL Adaptive Strategies, Transfer Training    Equipment Recommendations:  Equipment Needed: No    Plan:  Times per week: 5x  Current Treatment Recommendations: Strengthening, Endurance Training, Balance Training, Functional Mobility Training, Patient/Caregiver Education & Training, Self-Care / ADL, Home Management Training, Safety Education & Training. See long-term goal time frame for expected duration of plan of care. If no long-term goals established, a short length of stay is anticipated. Goals:  Patient goals : Go Home  Short term goals  Time Frame for Short term goals: Until discharge  Short term goal 1: Pt will complete BUE light resistive exercises with min vcs for safety to increase indep and safety with all self cares and transfers. Short term goal 2: Pt will complete standing tolerance x3 minutes with 1-2 UE and CGA release to increase indep and safety with all grooming. Short term goal 3: Pt will complete functional mobility to/from BR and HH distances with SBA and min vcs for safety to increase indep and safety with all toileting. Short term goal 4: Pt will complete LB dressing with LHAE and min A to increase indep and safety with all self cares. Following session, patient left in safe position with all fall risk precautions in place.

## 2020-11-05 NOTE — PROGRESS NOTES
Assessment and Plan:        1. covid 19 infection, main sx is fatigue. PT is to see. May need tcu    CC: No pep  HPI: pt with cad, cabg, presented with fatigue. ROS (12 point review of systems completed. Pertinent positives noted.  Otherwise ROS is negative) :   PMH:  Per HPI  SHX:  At home with wife  FHX: Noncontributory    Allergies: See above    Medications:       aspirin  325 mg Oral Daily    atorvastatin  20 mg Oral Nightly    finasteride  5 mg Oral Daily    furosemide  40 mg Oral Daily    lactobacillus  1 capsule Oral Daily    metoprolol tartrate  50 mg Oral BID    potassium chloride  20 mEq Oral Daily    enoxaparin  30 mg Subcutaneous Q12H    sodium chloride flush  10 mL Intravenous 2 times per day       Vital Signs:   /66   Pulse 81   Temp 97.2 °F (36.2 °C) (Oral)   Resp 20   Ht 5' 8\" (1.727 m)   Wt 251 lb 1.6 oz (113.9 kg)   SpO2 96%   BMI 38.18 kg/m²      Intake/Output Summary (Last 24 hours) at 11/5/2020 1050  Last data filed at 11/5/2020 0334  Gross per 24 hour   Intake 1095.1 ml   Output --   Net 1095.1 ml        Physical Examination: General appearance - alert, well appearing, and in no distress  Mental status - alert, oriented to person, place, and time  Neck - supple, no significant adenopathy, no JVD, or carotid bruits  Chest - clear to auscultation, no wheezes, rales or rhonchi, symmetric air entry  Heart - normal rate, regular rhythm, normal S1, S2, no murmurs, rubs, clicks or gallops  Abdomen - soft, nontender, nondistended, no masses or organomegaly  Neurological - alert, oriented, normal speech, no focal findings or movement disorder noted  Musculoskeletal - no joint tenderness, deformity or swelling  Extremities - peripheral pulses normal, no pedal edema, no clubbing or cyanosis  Skin - normal coloration and turgor, no rashes, no suspicious skin lesions noted    Data: (All radiographs, tracings, PFTs, and imaging are personally viewed and interpreted unless

## 2020-11-05 NOTE — CARE COORDINATION
20, 8:23 AM EST  DISCHARGE PLANNING EVALUATION:    Katerin Nam       Admitted from: ED  [facility] day: 1   Location: [address] [place] [address]- Reason for admit: COVID-19 [U07.1] Status: IP  Admit order signed?: yes  PMH:  has a past medical history of Acute on chronic diastolic CHF (congestive heart failure), NYHA class 2 ([facility] Arthritis, [facility] fl[facility] BPH (benign prostatic hyperplasia), CAD (coronary artery disease), Cancer ([facility] CKD (chronic kidney disease) stage 3, GFR 30-59 ml/min, COPD with exacerbation ([facility] Hyperlipidemia, Hypertension, NSTEMI (non-ST elevated myocardial infarction) ([facility] Obesity (BMI 35.0-39.9 without comorbidity), ARGENIS (obstructive sleep apnea), S/P CABG x 3, S/P cardiac cath: 2016: Tight LM lesion estimated at 90% along with the ostium of LAD. The ostium of the LCX seems to be significantly narrowed as well. 90% mid-RCA and 100% distal RCA, and TIA (transient ischemic attack). Procedure: na  Pertinent abnormal Imagin/04  CXR- neg  Medications:  Scheduled Meds:   aspirin  325 mg Oral Daily    atorvastatin  20 mg Oral Nightly    finasteride  5 mg Oral Daily    furosemide  40 mg Oral Daily    lactobacillus  1 capsule Oral Daily    metoprolol tartrate  50 mg Oral BID    potassium chloride  20 mEq Oral Daily    enoxaparin  30 mg Subcutaneous Q12H    sodium chloride flush  10 mL Intravenous 2 times per day     Continuous Infusions:   sodium chloride 65 mL/hr at 20      Pertinent Info/Orders/Treatment Plan: To ED with progressive weakness, fatigue, cough. Symptoms have been developing over the past 4 to 5 days. Telemetry, on room air, Decadron given in ER, Tmax 100.1, CPAP at HS, IVF at 65/hr, procal, CRP, Lovenox, K+ tab, Lopressor, Lasix, ASA. Diet: DIET GENERAL;   Smoking status:  reports that he quit smoking about 70 years ago. His smoking use included cigarettes. He started smoking about 74 years ago.  He has a 7.50 pack-year smoking history. He has never used smokeless tobacco.   PCP: PEPITO Yun CNP  Readmission 30 days or less: no  Readmission Risk Score: 13%    Discharge Planning Evaluation  Current Residence:     Living Arrangements:      Support Systems:     Current Services PTA:     Potential Assistance Needed:     Potential Assistance Purchasing Medications:     Does patient want to participate in local refill/ meds to beds program?     Type of Home Care Services:     Patient expects to be discharged to:     Expected Discharge date: Follow Up Appointment: Best Day/ Time:      Patient Goals/Plan/Treatment Preferences: met with Nuzhat Blas this am; he resides home with his wife. He was driving pta, has PCP, has home CPAP. He declines HH or needs at time of discharge. If he needs home oxygen, he is unsure of which agency to use and stated his CPAP is at home as he used SR last evening. Transportation/Food Security/Housekeeping Addressed:  No issues identified.     Evaluation: no

## 2020-11-05 NOTE — PROGRESS NOTES
Patient requesting not to call and update wife at this time as he was able to call wife last night and speak with her.

## 2020-11-06 ENCOUNTER — HOSPITAL ENCOUNTER (INPATIENT)
Age: 85
LOS: 12 days | Discharge: NURSING, REHAB OR LONG-TERM CARE | DRG: 177 | End: 2020-11-18
Attending: FAMILY MEDICINE | Admitting: FAMILY MEDICINE
Payer: MEDICARE

## 2020-11-06 VITALS
BODY MASS INDEX: 38.92 KG/M2 | HEART RATE: 76 BPM | WEIGHT: 256.8 LBS | HEIGHT: 68 IN | RESPIRATION RATE: 22 BRPM | TEMPERATURE: 98.3 F | DIASTOLIC BLOOD PRESSURE: 62 MMHG | SYSTOLIC BLOOD PRESSURE: 143 MMHG | OXYGEN SATURATION: 94 %

## 2020-11-06 LAB
D-DIMER QUANTITATIVE: 693 NG/ML FEU (ref 0–500)
VITAMIN B-12: 659 PG/ML (ref 211–911)

## 2020-11-06 PROCEDURE — 97162 PT EVAL MOD COMPLEX 30 MIN: CPT

## 2020-11-06 PROCEDURE — 94660 CPAP INITIATION&MGMT: CPT

## 2020-11-06 PROCEDURE — 82607 VITAMIN B-12: CPT

## 2020-11-06 PROCEDURE — 36415 COLL VENOUS BLD VENIPUNCTURE: CPT

## 2020-11-06 PROCEDURE — 99238 HOSP IP/OBS DSCHRG MGMT 30/<: CPT | Performed by: INTERNAL MEDICINE

## 2020-11-06 PROCEDURE — 6370000000 HC RX 637 (ALT 250 FOR IP): Performed by: INTERNAL MEDICINE

## 2020-11-06 PROCEDURE — 1290000000 HC SEMI PRIVATE OTHER R&B

## 2020-11-06 PROCEDURE — 0220000000 HC SKILLED NURSING FACILITY

## 2020-11-06 PROCEDURE — 97110 THERAPEUTIC EXERCISES: CPT

## 2020-11-06 PROCEDURE — 85379 FIBRIN DEGRADATION QUANT: CPT

## 2020-11-06 PROCEDURE — 94761 N-INVAS EAR/PLS OXIMETRY MLT: CPT

## 2020-11-06 PROCEDURE — 6360000002 HC RX W HCPCS: Performed by: INTERNAL MEDICINE

## 2020-11-06 RX ORDER — FINASTERIDE 5 MG/1
5 TABLET, FILM COATED ORAL DAILY
Status: CANCELLED | OUTPATIENT
Start: 2020-11-07

## 2020-11-06 RX ORDER — ACETAMINOPHEN 325 MG/1
650 TABLET ORAL EVERY 6 HOURS PRN
Status: CANCELLED | OUTPATIENT
Start: 2020-11-06

## 2020-11-06 RX ORDER — SODIUM CHLORIDE 0.9 % (FLUSH) 0.9 %
10 SYRINGE (ML) INJECTION PRN
Status: CANCELLED | OUTPATIENT
Start: 2020-11-06

## 2020-11-06 RX ORDER — METOPROLOL TARTRATE 50 MG/1
50 TABLET, FILM COATED ORAL 2 TIMES DAILY
Status: CANCELLED | OUTPATIENT
Start: 2020-11-06

## 2020-11-06 RX ORDER — ACETAMINOPHEN 650 MG/1
650 SUPPOSITORY RECTAL EVERY 6 HOURS PRN
Status: CANCELLED | OUTPATIENT
Start: 2020-11-06

## 2020-11-06 RX ORDER — POLYETHYLENE GLYCOL 3350 17 G/17G
17 POWDER, FOR SOLUTION ORAL DAILY PRN
Status: CANCELLED | OUTPATIENT
Start: 2020-11-06

## 2020-11-06 RX ORDER — FUROSEMIDE 40 MG/1
40 TABLET ORAL DAILY
Status: CANCELLED | OUTPATIENT
Start: 2020-11-07

## 2020-11-06 RX ORDER — SODIUM CHLORIDE 0.9 % (FLUSH) 0.9 %
10 SYRINGE (ML) INJECTION EVERY 12 HOURS SCHEDULED
Status: CANCELLED | OUTPATIENT
Start: 2020-11-06

## 2020-11-06 RX ORDER — LACTOBACILLUS RHAMNOSUS GG 10B CELL
1 CAPSULE ORAL DAILY
Status: CANCELLED | OUTPATIENT
Start: 2020-11-07

## 2020-11-06 RX ORDER — ATORVASTATIN CALCIUM 20 MG/1
20 TABLET, FILM COATED ORAL NIGHTLY
Status: CANCELLED | OUTPATIENT
Start: 2020-11-06

## 2020-11-06 RX ORDER — POTASSIUM CHLORIDE 750 MG/1
20 TABLET, FILM COATED, EXTENDED RELEASE ORAL DAILY
Status: CANCELLED | OUTPATIENT
Start: 2020-11-07

## 2020-11-06 RX ORDER — ASPIRIN 325 MG
325 TABLET ORAL DAILY
Status: CANCELLED | OUTPATIENT
Start: 2020-11-07

## 2020-11-06 RX ADMIN — FUROSEMIDE 40 MG: 40 TABLET ORAL at 12:06

## 2020-11-06 RX ADMIN — ENOXAPARIN SODIUM 30 MG: 30 INJECTION SUBCUTANEOUS at 13:48

## 2020-11-06 RX ADMIN — ASPIRIN 325 MG: 325 TABLET, FILM COATED ORAL at 12:07

## 2020-11-06 RX ADMIN — ENOXAPARIN SODIUM 30 MG: 30 INJECTION SUBCUTANEOUS at 21:55

## 2020-11-06 RX ADMIN — Medication 1 CAPSULE: at 12:06

## 2020-11-06 RX ADMIN — ATORVASTATIN CALCIUM 20 MG: 20 TABLET, FILM COATED ORAL at 21:24

## 2020-11-06 RX ADMIN — POTASSIUM CHLORIDE 20 MEQ: 750 TABLET, FILM COATED, EXTENDED RELEASE ORAL at 12:20

## 2020-11-06 RX ADMIN — METOPROLOL TARTRATE 50 MG: 50 TABLET, FILM COATED ORAL at 12:06

## 2020-11-06 RX ADMIN — METOPROLOL TARTRATE 50 MG: 50 TABLET, FILM COATED ORAL at 21:24

## 2020-11-06 RX ADMIN — FINASTERIDE 5 MG: 5 TABLET, FILM COATED ORAL at 12:07

## 2020-11-06 ASSESSMENT — PAIN SCALES - GENERAL
PAINLEVEL_OUTOF10: 0

## 2020-11-06 NOTE — PROGRESS NOTES
[address] [address]  INPATIENT PHYSICAL THERAPY  EVALUATION  [facility] MED SURG 8B - 8B-33/033-A    Time In: 1100  Time Out: 0700  Timed Code Treatment Minutes: 14 Minutes  Minutes: 29          Date: 2020  Patient Name: Sandra Falcon,  Gender:  male        MRN: [id number]  : 1934  (80 y.o.)      Referring Practitioner: Margaret Ponce MD  Diagnosis: COVID-19  Additional Pertinent Hx: Per EMR, Andrew Feldman is a 80 y.o. male who presents to the Emergency Department for the evaluation of progressive weakness, fatigue, cough. Symptoms have been developing over the past 4 to 5 days. \"     Restrictions/Precautions:  Restrictions/Precautions: General Precautions, Fall Risk  Position Activity Restriction  Other position/activity restrictions: COVID 19+, -Room air    Subjective:  Chart Reviewed: Yes  Patient assessed for rehabilitation services?: Yes  Subjective: Pt resting in bed and agrees to therapy. General:  Overall Orientation Status: Within Functional Limits    Vision: Impaired  Vision Exceptions: Wears glasses at all times    Hearing: Exceptions to [facility]  Hearing Exceptions: Hard of hearing/hearing concerns, Bilateral hearing aid         Pain: denies     Social/Functional History:    Lives With: Spouse  Type of Home: House  Home Layout: Two level, Performs ADL's on one level, Able to Live on Main level with bedroom/bathroom  Home Access: Stairs to enter with rails  Entrance Stairs - Number of Steps: 1 CHRISTY  Entrance Stairs - Rails: Both  Home Equipment: Rolling walker     Bathroom Shower/Tub: Tub/Shower unit, Shower chair with back  Bathroom Toilet: Handicap height  Bathroom Equipment: Hand-held shower  Bathroom Accessibility: Accessible       ADL Assistance: Independent  Homemaking Assistance: Needs assistance(Spouse completes laundry and cleaning.  Pt reports the eat out alot or kids bring over meals.)  Homemaking Responsibilities: Yes  Ambulation Assistance: Independent  Transfer Assistance: Independent    Active : Yes  Occupation: Retired       OBJECTIVE:  Range of Motion:  Bilateral Lower Extremity: WFL    Strength:  Bilateral Lower Extremity: grossly 4-/5    Balance:  Static Sitting Balance:  Supervision  Dynamic Sitting Balance: Stand By Assistance  Static Standing Balance: Contact Guard Assistance  Dynamic Standing Balance: Contact Guard Assistance    Bed Mobility:  Supine to Sit: [employer], with head of bed raised, with increased time for completion  Sit to Supine: Contact Guard Assistance     Transfers:  Sit to Stand: Contact Guard Assistance  Stand to [address] [address]    Ambulation:  Contact Guard Assistance  Distance: 12 ft x 2  Surface: Level Tile  Device:Rolling Walker  Gait Deviations: Forward Flexed Posture, Decreased Step Length Bilaterally, Decreased Gait Speed and Decreased Heel Strike Bilaterally    Exercise:  Patient was guided in 1 set(s) 5-10 reps of exercise to both lower extremities. Ankle pumps, Glut sets, Quad sets, Heelslides, Hip abduction/adduction, Seated marches, Seated heel/toe raises and Long arc quads. Exercises were completed for increased independence with functional mobility. Functional Outcome Measures: Completed  AM-PAC Inpatient Mobility without Stair Climbing Raw Score : 15  AM-PAC Inpatient without Stair Climbing T-Scale Score : 43.03    ASSESSMENT:  Activity Tolerance:  Patient tolerance of  treatment: good. With rest breaks      Treatment Initiated: Treatment and education initiated within context of evaluation. Evaluation time included review of current medical information, gathering information related to past medical, social and functional history, completion of standardized testing, formal and informal observation of tasks, assessment of data and development of plan of care and goals.   Treatment time included skilled education and facilitation of tasks to increase safety and independence with functional mobility for improved independence and quality of life. Assessment: Body structures, Functions, Activity limitations: Decreased functional mobility , Decreased balance, Decreased strength, Decreased endurance  Assessment: Pt is an 80 y.o. male that is deconditioned due to illness with COVID virus. Pt participated fairly well and is requiring frequent rest breaks with mild SOB. Pt is on room air during the day. Pt was Mod I in PLOF with use of RW. Pt would benefit from continued skilled PT to address endurance building, strengthening, and functional mobility. Prognosis: Good    REQUIRES PT FOLLOW UP: Yes    Discharge Recommendations:  Discharge Recommendations: Continue to assess pending progress, Subacute/Skilled Nursing Facility    Patient Education:  PT Education: Goals, PT Role, Plan of Care, Home Exercise Program    Equipment Recommendations: Other: monitor for needs    Plan:  Times per week: 4-5x GM  Current Treatment Recommendations: Strengthening, Balance Training, Endurance Training, Functional Mobility Training, Transfer Training, Gait Training, Stair training, Patient/Caregiver Education & Training, Safety Education & Training, Home Exercise Program    Goals:  Patient goals : go home  Short term goals  Time Frame for Short term goals: at discharge  Short term goal 1: Pt to be Mod I for supine <> sit to get in/out of bed  Short term goal 2: Pt to be Mod I for sit <> stand to get up to ambulate  Short term goal 3: Pt to ambulate > 80 ft with RW with Supervision for household distances  Short term goal 4: Pt to negotiate 1 step with 1-2 rails or RW with SBA for home access  Long term goals  Time Frame for Long term goals : not set due to short ELOS    Following session, patient left in safe position with all fall risk precautions in place. Davonte Hylton.  Leanne Rider [place] 8

## 2020-11-06 NOTE — PROGRESS NOTES
Patient's wife, Magdalena Kahn, notified of patient status and planned transfer to . Questions answered. Patient nares swabbed with alcohol, tolerated well.

## 2020-11-06 NOTE — PROGRESS NOTES
Spoke with patient about TCU. Patient is agreeable to TCU. Plan TCU today. Patient will go to [phone].

## 2020-11-06 NOTE — PROGRESS NOTES
Assessment and Plan:        1. covid 19 infection, main sx is fatigue. PT is to see. May need tcu    CC: No pep  HPI: pt with cad, cabg, presented with fatigue. ROS (12 point review of systems completed. Pertinent positives noted.  Otherwise ROS is negative) :   PMH:  Per HPI  SHX:  At home with wife  FHX: Noncontributory    Allergies: See above    Medications:       aspirin  325 mg Oral Daily    atorvastatin  20 mg Oral Nightly    finasteride  5 mg Oral Daily    furosemide  40 mg Oral Daily    lactobacillus  1 capsule Oral Daily    metoprolol tartrate  50 mg Oral BID    potassium chloride  20 mEq Oral Daily    enoxaparin  30 mg Subcutaneous Q12H    sodium chloride flush  10 mL Intravenous 2 times per day       Vital Signs:   /63   Pulse 99   Temp 98.2 °F (36.8 °C) (Oral)   Resp 18   Ht 5' 8\" (1.727 m)   Wt 256 lb 12.8 oz (116.5 kg)   SpO2 95%   BMI 39.05 kg/m²      Intake/Output Summary (Last 24 hours) at 11/6/2020 1120  Last data filed at 11/6/2020 0413  Gross per 24 hour   Intake 900 ml   Output 0 ml   Net 900 ml        Physical Examination: General appearance - alert, well appearing, and in no distress  Mental status - alert, oriented to person, place, and time  Neck - supple, no significant adenopathy, no JVD, or carotid bruits  Chest - clear to auscultation, no wheezes, rales or rhonchi, symmetric air entry  Heart - normal rate, regular rhythm, normal S1, S2, no murmurs, rubs, clicks or gallops  Abdomen - soft, nontender, nondistended, no masses or organomegaly  Neurological - alert, oriented, normal speech, no focal findings or movement disorder noted  Musculoskeletal - no joint tenderness, deformity or swelling  Extremities - peripheral pulses normal, no pedal edema, no clubbing or cyanosis  Skin - normal coloration and turgor, no rashes, no suspicious skin lesions noted    Data: (All radiographs, tracings, PFTs, and imaging are personally viewed and interpreted unless otherwise noted).           Electronically signed by Kaycee Vazquez MD on 11/6/2020 at 11:20 AM

## 2020-11-06 NOTE — CARE COORDINATION
11/6/20, 3:19 PM EST    Patient goals/plan/ treatment preferences discussed by  and . Patient goals/plan/ treatment preferences reviewed with patient/ family. Patient/ family verbalize understanding of discharge plan and are in agreement with goal/plan/treatment preferences. Understanding was demonstrated using the teach back method. AVS provided by RN at time of discharge, which includes all necessary medical information pertaining to the patients current course of illness, treatment, post-discharge goals of care, and treatment preferences. IMM Letter  IMM Letter given to Patient/Family/Significant other/Guardian/POA/by[de-identified] pt access  IMM Letter date given[de-identified] 11/04/20  IMM Letter time given[de-identified] 200     SW consult received and patient is discharging to TCU today.

## 2020-11-06 NOTE — PROGRESS NOTES
This RN asked patient if he would like me to update wife in am (at [facility]). Patient stated not to call in am and he would call her sometime today.

## 2020-11-06 NOTE — CARE COORDINATION
11/6/20, 2:54 PM EST    Patient goals/plan/ treatment preferences discussed by  and . Patient goals/plan/ treatment preferences reviewed with patient/ family. Patient/ family verbalize understanding of discharge plan and are in agreement with goal/plan/treatment preferences. Understanding was demonstrated using the teach back method. AVS provided by RN at time of discharge, which includes all necessary medical information pertaining to the patients current course of illness, treatment, post-discharge goals of care, and treatment preferences.         IMM Letter  IMM Letter given to Patient/Family/Significant other/Guardian/POA/by[de-identified] pt access  IMM Letter date given[de-identified] 11/04/20  IMM Letter time given[de-identified] [id number] risk factors

## 2020-11-07 PROBLEM — R29.898 MUSCULAR DECONDITIONING: Status: ACTIVE | Noted: 2020-11-07

## 2020-11-07 PROBLEM — J44.9 COPD (CHRONIC OBSTRUCTIVE PULMONARY DISEASE) (HCC): Status: ACTIVE | Noted: 2020-11-07

## 2020-11-07 PROBLEM — E66.01 CLASS 2 SEVERE OBESITY DUE TO EXCESS CALORIES WITH SERIOUS COMORBIDITY AND BODY MASS INDEX (BMI) OF 39.0 TO 39.9 IN ADULT (HCC): Status: ACTIVE | Noted: 2020-11-07

## 2020-11-07 PROBLEM — E78.5 HLD (HYPERLIPIDEMIA): Status: ACTIVE | Noted: 2020-11-07

## 2020-11-07 PROBLEM — E66.812 CLASS 2 SEVERE OBESITY DUE TO EXCESS CALORIES WITH SERIOUS COMORBIDITY AND BODY MASS INDEX (BMI) OF 39.0 TO 39.9 IN ADULT: Status: ACTIVE | Noted: 2020-11-07

## 2020-11-07 LAB
ANION GAP SERPL CALCULATED.3IONS-SCNC: 11 MEQ/L (ref 8–16)
BASOPHILS # BLD: 0.6 %
BASOPHILS ABSOLUTE: 0 THOU/MM3 (ref 0–0.1)
BUN BLDV-MCNC: 23 MG/DL (ref 7–22)
CALCIUM SERPL-MCNC: 7.6 MG/DL (ref 8.5–10.5)
CHLORIDE BLD-SCNC: 106 MEQ/L (ref 98–111)
CO2: 20 MEQ/L (ref 23–33)
CREAT SERPL-MCNC: 1.1 MG/DL (ref 0.4–1.2)
EOSINOPHIL # BLD: 0.8 %
EOSINOPHILS ABSOLUTE: 0 THOU/MM3 (ref 0–0.4)
ERYTHROCYTE [DISTWIDTH] IN BLOOD BY AUTOMATED COUNT: 13.2 % (ref 11.5–14.5)
ERYTHROCYTE [DISTWIDTH] IN BLOOD BY AUTOMATED COUNT: 50.6 FL (ref 35–45)
GFR SERPL CREATININE-BSD FRML MDRD: 63 ML/MIN/1.73M2
GLUCOSE BLD-MCNC: 106 MG/DL (ref 70–108)
HCT VFR BLD CALC: 39 % (ref 42–52)
HEMOGLOBIN: 12.4 GM/DL (ref 14–18)
IMMATURE GRANS (ABS): 0.02 THOU/MM3 (ref 0–0.07)
IMMATURE GRANULOCYTES: 0.6 %
LYMPHOCYTES # BLD: 15.7 %
LYMPHOCYTES ABSOLUTE: 0.6 THOU/MM3 (ref 1–4.8)
MCH RBC QN AUTO: 32.8 PG (ref 26–33)
MCHC RBC AUTO-ENTMCNC: 31.8 GM/DL (ref 32.2–35.5)
MCV RBC AUTO: 103.2 FL (ref 80–94)
MONOCYTES # BLD: 4.7 %
MONOCYTES ABSOLUTE: 0.2 THOU/MM3 (ref 0.4–1.3)
NUCLEATED RED BLOOD CELLS: 0 /100 WBC
PLATELET # BLD: 176 THOU/MM3 (ref 130–400)
PMV BLD AUTO: 9.7 FL (ref 9.4–12.4)
POTASSIUM SERPL-SCNC: 4.2 MEQ/L (ref 3.5–5.2)
RBC # BLD: 3.78 MILL/MM3 (ref 4.7–6.1)
SEG NEUTROPHILS: 77.6 %
SEGMENTED NEUTROPHILS ABSOLUTE COUNT: 2.8 THOU/MM3 (ref 1.8–7.7)
SODIUM BLD-SCNC: 137 MEQ/L (ref 135–145)
WBC # BLD: 3.6 THOU/MM3 (ref 4.8–10.8)

## 2020-11-07 PROCEDURE — 6370000000 HC RX 637 (ALT 250 FOR IP): Performed by: FAMILY MEDICINE

## 2020-11-07 PROCEDURE — 2500000003 HC RX 250 WO HCPCS: Performed by: FAMILY MEDICINE

## 2020-11-07 PROCEDURE — 94660 CPAP INITIATION&MGMT: CPT

## 2020-11-07 PROCEDURE — 85025 COMPLETE CBC W/AUTO DIFF WBC: CPT

## 2020-11-07 PROCEDURE — 80048 BASIC METABOLIC PNL TOTAL CA: CPT

## 2020-11-07 PROCEDURE — 6360000002 HC RX W HCPCS: Performed by: INTERNAL MEDICINE

## 2020-11-07 PROCEDURE — 2500000003 HC RX 250 WO HCPCS: Performed by: INTERNAL MEDICINE

## 2020-11-07 PROCEDURE — 6370000000 HC RX 637 (ALT 250 FOR IP): Performed by: INTERNAL MEDICINE

## 2020-11-07 PROCEDURE — 97110 THERAPEUTIC EXERCISES: CPT

## 2020-11-07 PROCEDURE — 36415 COLL VENOUS BLD VENIPUNCTURE: CPT

## 2020-11-07 PROCEDURE — 1290000000 HC SEMI PRIVATE OTHER R&B

## 2020-11-07 PROCEDURE — 97162 PT EVAL MOD COMPLEX 30 MIN: CPT

## 2020-11-07 PROCEDURE — 97116 GAIT TRAINING THERAPY: CPT

## 2020-11-07 RX ORDER — DOCUSATE SODIUM 100 MG/1
100 CAPSULE, LIQUID FILLED ORAL 2 TIMES DAILY PRN
Status: DISCONTINUED | OUTPATIENT
Start: 2020-11-07 | End: 2020-11-18 | Stop reason: HOSPADM

## 2020-11-07 RX ORDER — FAMOTIDINE 20 MG/1
20 TABLET, FILM COATED ORAL DAILY
Status: DISCONTINUED | OUTPATIENT
Start: 2020-11-07 | End: 2020-11-18 | Stop reason: HOSPADM

## 2020-11-07 RX ORDER — SODIUM CHLORIDE 0.9 % (FLUSH) 0.9 %
10 SYRINGE (ML) INJECTION EVERY 12 HOURS SCHEDULED
Status: DISCONTINUED | OUTPATIENT
Start: 2020-11-07 | End: 2020-11-07

## 2020-11-07 RX ORDER — ACETAMINOPHEN 650 MG/1
650 SUPPOSITORY RECTAL EVERY 6 HOURS PRN
Status: DISCONTINUED | OUTPATIENT
Start: 2020-11-07 | End: 2020-11-18 | Stop reason: HOSPADM

## 2020-11-07 RX ORDER — POLYETHYLENE GLYCOL 3350 17 G/17G
17 POWDER, FOR SOLUTION ORAL DAILY PRN
Status: DISCONTINUED | OUTPATIENT
Start: 2020-11-07 | End: 2020-11-18 | Stop reason: HOSPADM

## 2020-11-07 RX ORDER — SODIUM CHLORIDE 0.9 % (FLUSH) 0.9 %
10 SYRINGE (ML) INJECTION PRN
Status: DISCONTINUED | OUTPATIENT
Start: 2020-11-07 | End: 2020-11-07

## 2020-11-07 RX ORDER — ASPIRIN 325 MG
325 TABLET ORAL DAILY
Status: DISCONTINUED | OUTPATIENT
Start: 2020-11-07 | End: 2020-11-18 | Stop reason: HOSPADM

## 2020-11-07 RX ORDER — FINASTERIDE 5 MG/1
5 TABLET, FILM COATED ORAL DAILY
Status: DISCONTINUED | OUTPATIENT
Start: 2020-11-07 | End: 2020-11-18 | Stop reason: HOSPADM

## 2020-11-07 RX ORDER — SENNA PLUS 8.6 MG/1
1 TABLET ORAL NIGHTLY PRN
Status: DISCONTINUED | OUTPATIENT
Start: 2020-11-07 | End: 2020-11-18 | Stop reason: HOSPADM

## 2020-11-07 RX ORDER — ACETAMINOPHEN 325 MG/1
650 TABLET ORAL EVERY 6 HOURS PRN
Status: DISCONTINUED | OUTPATIENT
Start: 2020-11-07 | End: 2020-11-18 | Stop reason: HOSPADM

## 2020-11-07 RX ORDER — POTASSIUM CHLORIDE 20 MEQ/1
20 TABLET, EXTENDED RELEASE ORAL DAILY
Status: DISCONTINUED | OUTPATIENT
Start: 2020-11-07 | End: 2020-11-18 | Stop reason: HOSPADM

## 2020-11-07 RX ORDER — METOPROLOL TARTRATE 50 MG/1
50 TABLET, FILM COATED ORAL 2 TIMES DAILY
Status: DISCONTINUED | OUTPATIENT
Start: 2020-11-07 | End: 2020-11-18 | Stop reason: HOSPADM

## 2020-11-07 RX ORDER — LACTOBACILLUS RHAMNOSUS GG 10B CELL
1 CAPSULE ORAL DAILY
Status: DISCONTINUED | OUTPATIENT
Start: 2020-11-07 | End: 2020-11-18 | Stop reason: HOSPADM

## 2020-11-07 RX ORDER — ATORVASTATIN CALCIUM 20 MG/1
20 TABLET, FILM COATED ORAL NIGHTLY
Status: DISCONTINUED | OUTPATIENT
Start: 2020-11-07 | End: 2020-11-18 | Stop reason: HOSPADM

## 2020-11-07 RX ORDER — FUROSEMIDE 40 MG/1
40 TABLET ORAL DAILY
Status: DISCONTINUED | OUTPATIENT
Start: 2020-11-07 | End: 2020-11-18 | Stop reason: HOSPADM

## 2020-11-07 RX ADMIN — ACETAMINOPHEN 650 MG: 325 TABLET ORAL at 09:28

## 2020-11-07 RX ADMIN — ACETAMINOPHEN 650 MG: 325 TABLET ORAL at 04:42

## 2020-11-07 RX ADMIN — FINASTERIDE 5 MG: 5 TABLET, FILM COATED ORAL at 09:27

## 2020-11-07 RX ADMIN — ENOXAPARIN SODIUM 30 MG: 30 INJECTION SUBCUTANEOUS at 22:35

## 2020-11-07 RX ADMIN — FUROSEMIDE 40 MG: 40 TABLET ORAL at 09:27

## 2020-11-07 RX ADMIN — MICONAZOLE NITRATE: 20 POWDER TOPICAL at 09:27

## 2020-11-07 RX ADMIN — ATORVASTATIN CALCIUM 20 MG: 20 TABLET, FILM COATED ORAL at 22:36

## 2020-11-07 RX ADMIN — MICONAZOLE NITRATE: 20 POWDER TOPICAL at 22:38

## 2020-11-07 RX ADMIN — POTASSIUM CHLORIDE 20 MEQ: 1500 TABLET, EXTENDED RELEASE ORAL at 09:27

## 2020-11-07 RX ADMIN — Medication 1 CAPSULE: at 09:27

## 2020-11-07 RX ADMIN — METOPROLOL TARTRATE 50 MG: 50 TABLET, FILM COATED ORAL at 09:27

## 2020-11-07 RX ADMIN — Medication 5 UNITS: at 09:28

## 2020-11-07 RX ADMIN — FAMOTIDINE 20 MG: 20 TABLET, FILM COATED ORAL at 17:09

## 2020-11-07 RX ADMIN — ENOXAPARIN SODIUM 30 MG: 30 INJECTION SUBCUTANEOUS at 09:27

## 2020-11-07 RX ADMIN — ASPIRIN 325 MG: 325 TABLET, FILM COATED ORAL at 09:27

## 2020-11-07 RX ADMIN — METOPROLOL TARTRATE 50 MG: 50 TABLET, FILM COATED ORAL at 22:36

## 2020-11-07 ASSESSMENT — PAIN SCALES - GENERAL
PAINLEVEL_OUTOF10: 0
PAINLEVEL_OUTOF10: 1

## 2020-11-07 NOTE — PROGRESS NOTES
[address] [address]  INPATIENT PHYSICAL THERAPY  EVALUATION  - [facility] - [id number]    Time In: 5016  Time Out: 1600  Timed Code Treatment Minutes: 30 Minutes  Minutes: 45          Date: 2020  Patient Name: Lilly Montilla,  Gender:  male        MRN: [id number]  : 1934  (80 y.o.)      Referring Practitioner: Attending: Alistair Contreras MD; Ordering: Francisco Klinefelter, MD  Diagnosis: COVID-19  Additional Pertinent Hx: Per EMR, Mustapha Thomas is a 80 y.o. male who presents to the Emergency Department for the evaluation of progressive weakness, fatigue, cough. Symptoms have been developing over the past 4 to 5 days. \"     Restrictions/Precautions:  Restrictions/Precautions: General Precautions, Fall Risk  Position Activity Restriction  Other position/activity restrictions: COVID 19+, -Room air    Subjective:  Chart Reviewed: Yes  Patient assessed for rehabilitation services?: Yes  Subjective: Pt resting in recliner and agrees to therapy.     General:  Overall Orientation Status: Within Functional Limits    Vision: Impaired  Vision Exceptions: Wears glasses at all times    Hearing: Exceptions to [facility]  Hearing Exceptions: Hard of hearing/hearing concerns, Bilateral hearing aid         Pain: denies      Social/Functional History:    Lives With: Spouse  Type of Home: House  Home Layout: Two level, Performs ADL's on one level, Able to Live on Main level with bedroom/bathroom  Home Access: Stairs to enter with rails  Entrance Stairs - Number of Steps: 1 CHRISTY with wilmer rails  Home Equipment: Rolling walker                   Ambulation Assistance: Independent  Transfer Assistance: Independent    Active : Yes          OBJECTIVE:  Range of Motion:  Bilateral Lower Extremity: WFL    Strength:  Bilateral Lower Extremity: grossly 4/5    Balance:  Static Sitting Balance:  Modified Independent  Dynamic Sitting Balance: Stand By Assistance  Static Standing Balance: Stand By Assistance, with walker  Dynamic Standing Balance: Stand By Assistance, [employer], with walker    Bed Mobility:  Rolling to Left: Supervision, with light use of bedrail   Rolling to Right: Supervision, with light use of bedrail   Supine to Sit: Stand By Assistance, with increased time for completion, and light use of bedrail  Sit to Supine: Stand By Assistance     Transfers:  Sit to Stand: Contact Guard Assistance  Stand to Sit:Stand By Assistance, with verbal cues, for better positioning on 2/5 trials  Stand Pivot:Stand By Assistance    Ambulation:  Stand By Assistance, [employer] Assistance  Distance: 90 ft, 40 ft, and 130 ft  Surface: Level Tile and Uneven Surface  Device:Rolling Walker  Gait Deviations: Forward Flexed Posture, Decreased Step Length Bilaterally, Decreased Gait Speed and pt self corrected posture on 3 occasions. Stairs:  Contact Guard Assistance  Number of Steps: 4  Height: 6\" step with Bilateral Handrails   Pt descends with retro technique. Stairs:  Contact Guard Assistance  Number of Steps: 1 x 2 trials  Height: 2\" step with Rolling Walker      Exercise:  Patient was guided in 1 set(s) 10 reps of exercise to both lower extremities. Ankle pumps, Glut sets, Quad sets, Heelslides, Hip abduction/adduction, Seated marches and Long arc quads. Exercises were completed for increased independence with functional mobility. Functional Outcome Measures: Not completed       ASSESSMENT:  Activity Tolerance:  Patient tolerance of  treatment: good. O2 sats maintained in the 90's% throughout with lowest noted at 93%. Treatment Initiated: Treatment and education initiated within context of evaluation. Evaluation time included review of current medical information, gathering information related to past medical, social and functional history, completion of standardized testing, formal and informal observation of tasks, assessment of data and development of plan of care and goals.   Treatment time included skilled education and facilitation of tasks to increase safety and independence with functional mobility for improved independence and quality of life. Assessment: Body structures, Functions, Activity limitations: Decreased functional mobility , Decreased balance, Decreased strength, Decreased endurance  Assessment: Pt is an 80 y.o. male that is deconditioned due to illness with COVID virus. Pt participated fairly well and is requiring frequent rest breaks with mild SOB. Pt is on room air during the day. Pt was Mod I in PLOF with use of RW. Pt would benefit from continued skilled PT to address endurance building, strengthening, and functional mobility. Prognosis: Good         Discharge Recommendations:  Discharge Recommendations: Continue to assess pending progress    Patient Education:  PT Education: Goals, PT Role, Plan of Care, Home Exercise Program    Equipment Recommendations: Other: monitor for needs    Plan:  Times per week: 6x  Current Treatment Recommendations: Strengthening, Balance Training, Endurance Training, Functional Mobility Training, Transfer Training, Gait Training, Stair training, Patient/Caregiver Education & Training, Safety Education & Training, Home Exercise Program    Goals:  Patient goals : go home  Short term goals  Time Frame for Short term goals: 1 week  Short term goal 1: Pt to be Mod I for supine <> sit to get in/out of bed  Short term goal 2: Pt to be Mod I for sit <> stand to get up to ambulate  Short term goal 3: Pt to ambulate > 150 ft with RW with Supervision for household distances  Short term goal 4: Pt to negotiate 1 step with 1-2 rails or RW with SBA for home access  Long term goals  Time Frame for Long term goals : not set due to short ELOS    Following session, patient left in safe position with all fall risk precautions in place. Bryant Daily.  Leanne Rodarte [place] [address]

## 2020-11-07 NOTE — PLAN OF CARE
Problem: Urinary Elimination:  Intervention: Monitor signs and symptoms of a urinary tract infection  Note: Pt is having difficulty urinating at times

## 2020-11-07 NOTE — PROGRESS NOTES
Patient admitted to [id number] from [facility],  No family present at bedside. Oriented to call light, room, and staff. Bed functions explained and demonstrated for patient. Patient requesting to have 2 siderails at the head of bed in the upright position to facilitate use of bed functions. Admission packet and patient rights provided, questions answered. Explained patients right to have family, representative or physician notified of their admission. Patient has declined for physician to be notified. Patient has declined for family/representative to be notified. No needs expressed at this time. Two nurse skin assessment performed by Meryl BLANCO and Wei PALOMO. Redness and excoriation in groin. Rt shin dry and skin peeling. Buttocks red and blanchable    Admitting medication orders compared with acute stay medications; home medication list reviewed with patient/family. Medication issues identified N/A  Medication issue:N/A        Influenza immunization history reviewed, yes. Influenza (October-March) immunization offered. Patient denied flu vaccine . Order entered if requested; if declined, reason declined pt believes he has received the flu vaccine. Pneumococcal immunization history reviewed yes. Pneumococcal immunization offered yes. Patient requested.

## 2020-11-08 PROCEDURE — 94761 N-INVAS EAR/PLS OXIMETRY MLT: CPT

## 2020-11-08 PROCEDURE — 2700000000 HC OXYGEN THERAPY PER DAY

## 2020-11-08 PROCEDURE — 6370000000 HC RX 637 (ALT 250 FOR IP): Performed by: INTERNAL MEDICINE

## 2020-11-08 PROCEDURE — 6370000000 HC RX 637 (ALT 250 FOR IP): Performed by: FAMILY MEDICINE

## 2020-11-08 PROCEDURE — 94660 CPAP INITIATION&MGMT: CPT

## 2020-11-08 PROCEDURE — 1290000000 HC SEMI PRIVATE OTHER R&B

## 2020-11-08 PROCEDURE — 6360000002 HC RX W HCPCS: Performed by: INTERNAL MEDICINE

## 2020-11-08 PROCEDURE — 97535 SELF CARE MNGMENT TRAINING: CPT

## 2020-11-08 PROCEDURE — 97166 OT EVAL MOD COMPLEX 45 MIN: CPT

## 2020-11-08 RX ADMIN — MICONAZOLE NITRATE: 20 POWDER TOPICAL at 09:28

## 2020-11-08 RX ADMIN — MICONAZOLE NITRATE: 20 POWDER TOPICAL at 21:04

## 2020-11-08 RX ADMIN — METOPROLOL TARTRATE 50 MG: 50 TABLET, FILM COATED ORAL at 09:19

## 2020-11-08 RX ADMIN — ASPIRIN 325 MG: 325 TABLET, FILM COATED ORAL at 09:19

## 2020-11-08 RX ADMIN — FUROSEMIDE 40 MG: 40 TABLET ORAL at 09:19

## 2020-11-08 RX ADMIN — METOPROLOL TARTRATE 50 MG: 50 TABLET, FILM COATED ORAL at 21:03

## 2020-11-08 RX ADMIN — ENOXAPARIN SODIUM 30 MG: 30 INJECTION SUBCUTANEOUS at 21:03

## 2020-11-08 RX ADMIN — DOCUSATE SODIUM 100 MG: 100 CAPSULE, LIQUID FILLED ORAL at 09:19

## 2020-11-08 RX ADMIN — FINASTERIDE 5 MG: 5 TABLET, FILM COATED ORAL at 09:19

## 2020-11-08 RX ADMIN — ENOXAPARIN SODIUM 30 MG: 30 INJECTION SUBCUTANEOUS at 09:19

## 2020-11-08 RX ADMIN — FAMOTIDINE 20 MG: 20 TABLET, FILM COATED ORAL at 09:19

## 2020-11-08 RX ADMIN — POTASSIUM CHLORIDE 20 MEQ: 1500 TABLET, EXTENDED RELEASE ORAL at 09:19

## 2020-11-08 RX ADMIN — ATORVASTATIN CALCIUM 20 MG: 20 TABLET, FILM COATED ORAL at 21:03

## 2020-11-08 RX ADMIN — Medication 1 CAPSULE: at 09:19

## 2020-11-08 RX ADMIN — ACETAMINOPHEN 650 MG: 325 TABLET ORAL at 09:19

## 2020-11-08 ASSESSMENT — PAIN SCALES - GENERAL
PAINLEVEL_OUTOF10: 0
PAINLEVEL_OUTOF10: 0

## 2020-11-08 NOTE — PROGRESS NOTES
Assistance: Independent  Transfer Assistance: Independent    Active : Yes  Mode of Transportation: Car  Occupation: Retired       VISION:Corrected    HEARING:  WFL    COGNITION: Slow Processing, Decreased Recall, Decreased Insight, Impaired Memory, Decreased Problem Solving and Decreased Safety Awareness    RANGE OF MOTION:  Bilateral Upper Extremity:  WFL    STRENGTH:  Bilateral Upper Extremity:  Impaired - deconditioned noted. ADL:     EATING:Setup or clean-up assistance. Crystal Pearl CARE Score: 5. ORAL HYGIENE:Supervision or touching assistance, Setup or clean-up assistance. sitting. Crystal [employer] CARE Score: 4. TOILETING HYGIENE:Substantial/maximal assistance. Crystal Pearl CARE Score: 2. SHOWERING/BATHING:Partial/moderate assistance. Crystal Pearl CARE Score: 3.     UPPER BODY DRESSING:Partial/moderate assistance. Crystal Pearl CARE Score: 3. LOWER BODY DRESSING:Substantial/maximal assistance. Crystal Pearl CARE Score: 2. FOOTWEAR:Dependent   . CARE Score: 1.     TOILET TRANSFER: Supervision or touching assistance. Crystal Pearl CARE Score: 4.      * Rest breaks required throughout d/t SOB, O2 stats remained above 90% on room air. BALANCE:  Sitting Balance:  Stand By Assistance. Standing Balance: Contact Guard Assistance. BED MOBILITY:  Not Tested    TRANSFERS:  Sit to Stand:  [employer], with increased time for completion, cues for hand placement. Stand to Sit: [employer], with increased time for completion, cues for hand placement. FUNCTIONAL MOBILITY:  Assistive Device: Rolling Walker  Assist Level:  Contact Guard Assistance. Distance: To and from bathroom  Slow pace, No LOB, increased time to complete      Activity Tolerance:  Patient tolerance of  treatment: good. Assessment:  Assessment: This 80year old male is s/p covid.  Pt requires skilled OT intervention to increase indep and safety with all self cares, transfers, mobility, and IADLS to decrease fal risk and return to PLOF.  Performance deficits / Impairments: Decreased functional mobility , Decreased ADL status, Decreased safe awareness, Decreased balance, Decreased high-level IADLs, Decreased strength, Decreased endurance  Prognosis: Good  REQUIRES OT FOLLOW UP: No    Treatment Initiated: Treatment and education initiated within context of evaluation. Evaluation time included review of current medical information, gathering information related to past medical, social and functional history, completion of standardized testing, formal and informal observation of tasks, assessment of data and development of plan of care and goals. Treatment time included skilled education and facilitation of tasks to increase safety and independence with ADL's for improved functional independence and quality of life. Discharge Recommendations:  Home with Home health OT, Continue to assess pending progress    Patient Education:  OT Education: Plan of Care, OT Role, Precautions, ADL Adaptive Strategies, Transfer Training    Equipment Recommendations: Monitor Need        Plan:  Times per week: 6x  Current Treatment Recommendations: Strengthening, Endurance Training, Balance Training, Functional Mobility Training, Patient/Caregiver Education & Training, Self-Care / ADL, Home Management Training, Safety Education & Training. See long-term goal time frame for expected duration of plan of care. If no long-term goals established, a short length of stay is anticipated. Goals:  Patient goals : Go Home  Short term goals  Time Frame for Short term goals: 2 weeks  Short term goal 1: Pt will complete BUE light resistive exercises with min vcs for safety to increase indep and safety with all self cares and transfers. Short term goal 2: Pt will complete standing tolerance x3 minutes with 1-2 UE and SBA release to increase indep and safety with all grooming.   Short term goal 3: Pt will complete functional mobility to/from BR and HH distances with SBA and min vcs for safety to increase indep and safety with all toileting. Short term goal 4: Pt will complete LB dressing with LHAE and min A to increase indep and safety with all self cares. Long term goals  Time Frame for Long term goals : 4 weeks  Long term goal 1: Pt will complete BADL routine Mod indep with 0 vcs for safety and O2 stats remaining above 90% to increase indep and endurance in home environment. Long term goal 2: Pt will complete simple IADL with S and 0 vcs for safety to increase indep with getting a snack. Following session, patient left in safe position with all fall risk precautions in place.

## 2020-11-08 NOTE — H&P
TCU History and Physical      Chief Complaint and Reason for TCU Admission:   The need to continue time with therapies following the acute hospital stay for covid-19    History of Present Illness:  Nahid Jacob  is a 80 y.o. male admitted to the transitional care unit on 11/6/2020. He came to the hospital with concerns of weakness. He was found to be covid-19 positive and was treated in the usual manor. Following becoming more medically stable he comes to the TCU for more strengthening before the return home. Past Medical History:      Diagnosis Date    Acute on chronic diastolic CHF (congestive heart failure), NYHA class 2 (HCC)     Arthritis     Atrial flutter (HCC) 6/15/2016    BPH (benign prostatic hyperplasia)     CAD (coronary artery disease)     Cancer (HCC)     skin    CKD (chronic kidney disease) stage 3, GFR 30-59 ml/min 6/20/2016    COPD with exacerbation [facility] 6/15/2016    Hyperlipidemia     Hypertension     NSTEMI (non-ST elevated myocardial infarction) ([facility]     Obesity (BMI 35.0-39.9 without comorbidity) 06/27/2016    ARGENIS (obstructive sleep apnea)     uses cpap    S/P CABG x 3 06/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 The ostium of the LCX seems to be significantly narrowed as well. 90% mid-RCA and 100% distal RCA 6/20/2016 6/20/2016: Tight LM lesion estimated at 90% along with the ostium of LAD. The ostium of the LCX seems to be significantly narrowed as well. 90% mid-RCA and 100% distal RCA. IABP placed.  Dr. Tata Pozo TIA (transient ischemic attack)        Primary care provider: PEPITO Holt - CNP     Past Surgical History:      Procedure Laterality Date    CARDIAC SURGERY  06/2016    triple by pass    CYSTOSCOPY  04/20/2017    Right Ureteroscopy, Basket Retrieval of Stones, Stent - Dr. Kim Fountain  2009??    bilateral cataracts    MOHS SURGERY Left 01/26/2018    Mohs Repair BCC Left [place]     AL OUTER EAR SURGERY PROC UNLISTED Left 1/26/2018    MOHS REPAIR BCC LEFT EARLOBE performed by Pb Willard MD at [facility]         Allergies:    Patient has no known allergies. Current Medications:    Current Facility-Administered Medications: acetaminophen (TYLENOL) tablet 650 mg, 650 mg, Oral, Q6H PRN **OR** acetaminophen (TYLENOL) suppository 650 mg, 650 mg, Rectal, Q6H PRN  enoxaparin (LOVENOX) injection 30 mg, 30 mg, Subcutaneous, Q12H  polyethylene glycol (GLYCOLAX) packet 17 g, 17 g, Oral, Daily PRN  aspirin tablet 325 mg, 325 mg, Oral, Daily  atorvastatin (LIPITOR) tablet 20 mg, 20 mg, Oral, Nightly  finasteride (PROSCAR) tablet 5 mg, 5 mg, Oral, Daily  furosemide (LASIX) tablet 40 mg, 40 mg, Oral, Daily  lactobacillus (CULTURELLE) capsule 1 capsule, 1 capsule, Oral, Daily  metoprolol tartrate (LOPRESSOR) tablet 50 mg, 50 mg, Oral, BID  potassium chloride (KLOR-CON M) extended release tablet 20 mEq, 20 mEq, Oral, Daily  [START ON 11/9/2020] ppd (tuberculin skin test) read, , Does not apply, Weekly  tuberculin injection 5 Units, 5 Units, Intradermal, Weekly  miconazole (MICOTIN) 2 % powder, , Topical, BID  docusate sodium (COLACE) capsule 100 mg, 100 mg, Oral, BID PRN  senna (SENOKOT) tablet 8.6 mg, 1 tablet, Oral, Nightly PRN  famotidine (PEPCID) tablet 20 mg, 20 mg, Oral, Daily     Resident is taking an antipsychotic medication? No     If yes, was Gradual Dose Reduction (GDR) attempted? NA     No- GDR is clinically contraindicated due to the fact that tapering the medication  would not achieve the desired therapeutic effects and the current dose is  necessary to maintain or improve the resident's function, well-being, safety, and  quality of life.     Social History:  Social History     Socioeconomic History    Marital status:      Spouse name: Not on file    Number of children: Not on file    Years of education: Not on file    Highest education level: Not on file   Occupational History    Occupation: [employer]     Employer: RETIRED   Social Needs    Financial resource strain: Not on file    Food insecurity     Worry: Not on file     Inability: Not on file   [employer] needs     Medical: Not on file     Non-medical: Not on file   Tobacco Use    Smoking status: Former Smoker     Packs/day: 0.50     Years: 15.00     Pack years: 7.50     Types: Cigarettes     Start date: 1946     Last attempt to quit: 1950     Years since quittin.8    Smokeless tobacco: Never Used   Substance and Sexual Activity    Alcohol use:  Yes     Alcohol/week: 2.0 standard drinks     Types: 1 Cans of beer, 1 Standard drinks or equivalent per week     Comment: SOCIALLY ONCE PER MOTH     Drug use: No    Sexual activity: Not on file   Lifestyle    Physical activity     Days per week: Not on file     Minutes per session: Not on file    Stress: Not on file   Relationships    Social connections     Talks on phone: Not on file     Gets together: Not on file     Attends Sabianism service: Not on file     Active member of club or organization: Not on file     Attends meetings of clubs or organizations: Not on file     Relationship status: Not on file    Intimate partner violence     Fear of current or ex partner: Not on file     Emotionally abused: Not on file     Physically abused: Not on file     Forced sexual activity: Not on file   Other Topics Concern    Not on file   Social History Narrative    Not on file           Family History:       Problem Relation Age of Onset    Cancer Father         LUNG       Review of Systems:  CONSTITUTIONAL:  positive for  fatigue  EYES:  positive for  glasses  HEENT:  negative for  epistaxis  RESPIRATORY:  positive for  dyspnea  CARDIOVASCULAR:  negative for  chest pain  GASTROINTESTINAL:  negative for jaundice  GENITOURINARY:  negative for dysuria  SKIN:  negative  HEMATOLOGIC/LYMPHATIC:  negative for petechiae  MUSCULOSKELETAL:  positive for  myalgias, arthralgias and muscle weakness  NEUROLOGICAL:  negative for seizures  BEHAVIOR/PSYCH:  negative for increased agitation  10 point system review otherwise negative    Physical Exam:  BP (!) 140/73   Pulse 82   Temp 98.7 °F (37.1 °C) (Oral)   Resp 20   Ht 5' 8\" (1.727 m)   Wt 257 lb 4.8 oz (116.7 kg)   SpO2 96%   BMI 39.12 kg/m²   Well developed well nourished white male who is awake alert and cooperative  Skin atrophic  Membranes moist  Head normocephalic  Neck without mass  Chest symmetrical expansion  Heart S1S2 without murmur  Lungs CTA  Abd soft, non tender, normoactive BS and no mass  Ext with trace edema bilaterally  Neuro weak  Psy pleasant        Diagnostics:  Recent Results (from the past 24 hour(s))   Basic Metabolic Panel    Collection Time: 11/07/20 12:41 PM   Result Value Ref Range    Sodium 137 135 - 145 meq/L    Potassium 4.2 3.5 - 5.2 meq/L    Chloride 106 98 - 111 meq/L    CO2 20 (L) 23 - 33 meq/L    Glucose 106 70 - 108 mg/dL    BUN 23 (H) 7 - 22 mg/dL    CREATININE 1.1 0.4 - 1.2 mg/dL    Calcium 7.6 (L) 8.5 - 10.5 mg/dL   CBC Auto Differential    Collection Time: 11/07/20 12:41 PM   Result Value Ref Range    WBC 3.6 (L) 4.8 - 10.8 thou/mm3    RBC 3.78 (L) 4.70 - 6.10 mill/mm3    Hemoglobin 12.4 (L) 14.0 - 18.0 gm/dl    Hematocrit 39.0 (L) 42.0 - 52.0 %    .2 (H) 80.0 - 94.0 fL    MCH 32.8 26.0 - 33.0 pg    MCHC 31.8 (L) 32.2 - 35.5 gm/dl    RDW-CV 13.2 11.5 - 14.5 %    RDW-SD 50.6 (H) 35.0 - 45.0 fL    Platelets 194 725 - 501 thou/mm3    MPV 9.7 9.4 - 12.4 fL    Seg Neutrophils 77.6 %    Lymphocytes 15.7 %    Monocytes 4.7 %    Eosinophils 0.8 %    Basophils 0.6 %    Immature Granulocytes 0.6 %    Segs Absolute 2.8 1.8 - 7.7 thou/mm3    Lymphocytes Absolute 0.6 (L) 1.0 - 4.8 thou/mm3    Monocytes Absolute 0.2 (L) 0.4 - 1.3 thou/mm3    Eosinophils Absolute 0.0 0.0 - 0.4 thou/mm3    Basophils Absolute 0.0 0.0 - 0.1 thou/mm3 Immature Grans (Abs) 0.02 0.00 - 0.07 thou/mm3    nRBC 0 /100 wbc   Anion Gap    Collection Time: 11/07/20 12:41 PM   Result Value Ref Range    Anion Gap 11.0 8.0 - 16.0 meq/L   Glomerular Filtration Rate, Estimated    Collection Time: 11/07/20 12:41 PM   Result Value Ref Range    Est, Glom Filt Rate 63 (A) ml/min/1.73m2       Impression:  Active Hospital Problems    Diagnosis Date Noted    Muscular deconditioning [R29.898] 11/07/2020    Class 2 severe obesity due to excess calories with serious comorbidity and body mass index (BMI) of 39.0 to 39.9 in adult ([facility] [address] [E66.01, Z68.39] 11/07/2020    COPD (chronic obstructive pulmonary disease) ([facility] [J44.9] 11/07/2020    HLD (hyperlipidemia) [E78.5] 11/07/2020    COVID-19 [U07.1] 11/04/2020    Essential (primary) hypertension [I10] 10/03/2019    Coronary artery disease involving coronary bypass graft of native heart without angina pectoris [I25.810] 03/19/2017    History of TIA (transient ischemic attack) and stroke [Z86.73] 03/19/2017    CKD (chronic kidney disease) stage 3, GFR 30-59 ml/min [N18.30] 06/20/2016    Obstructive sleep apnea [G47.33]     BPH (benign prostatic hyperplasia) [N40.0] 12/18/2013   ·      Plan:   · The patient demonstrates good potential to participate in a skilled rehabilitation program on the transitional care unit. Rehabilitation services will include PT and OT/RT in order to improve functional status prior to discharge. Family education and training will be completed. Equipment evaluations and recommendations will be completed as appropriate. · Nursing will be involved for bowel, bladder, skin, and pain management. Nursing will also provide education and training to patient and family. · Prophylaxis:  DVT: lovenox. GI: pepcid. · Pain: tylenol  · Nutrition:  Consultation to dietician for nutritional counseling and recommendations.    · Bladder: continent  · Bowel: colace, senokot and glycolax  ·  and case

## 2020-11-09 PROCEDURE — 6370000000 HC RX 637 (ALT 250 FOR IP): Performed by: INTERNAL MEDICINE

## 2020-11-09 PROCEDURE — 92523 SPEECH SOUND LANG COMPREHEN: CPT

## 2020-11-09 PROCEDURE — 97110 THERAPEUTIC EXERCISES: CPT

## 2020-11-09 PROCEDURE — 6360000002 HC RX W HCPCS: Performed by: INTERNAL MEDICINE

## 2020-11-09 PROCEDURE — 97116 GAIT TRAINING THERAPY: CPT

## 2020-11-09 PROCEDURE — 97530 THERAPEUTIC ACTIVITIES: CPT

## 2020-11-09 PROCEDURE — 6370000000 HC RX 637 (ALT 250 FOR IP): Performed by: FAMILY MEDICINE

## 2020-11-09 PROCEDURE — 1290000000 HC SEMI PRIVATE OTHER R&B

## 2020-11-09 RX ADMIN — FAMOTIDINE 20 MG: 20 TABLET, FILM COATED ORAL at 09:39

## 2020-11-09 RX ADMIN — MICONAZOLE NITRATE: 20 POWDER TOPICAL at 21:56

## 2020-11-09 RX ADMIN — DOCUSATE SODIUM 100 MG: 100 CAPSULE, LIQUID FILLED ORAL at 09:40

## 2020-11-09 RX ADMIN — METOPROLOL TARTRATE 50 MG: 50 TABLET, FILM COATED ORAL at 21:34

## 2020-11-09 RX ADMIN — ASPIRIN 325 MG: 325 TABLET, FILM COATED ORAL at 09:39

## 2020-11-09 RX ADMIN — MICONAZOLE NITRATE: 20 POWDER TOPICAL at 09:41

## 2020-11-09 RX ADMIN — ACETAMINOPHEN 650 MG: 325 TABLET ORAL at 09:40

## 2020-11-09 RX ADMIN — FINASTERIDE 5 MG: 5 TABLET, FILM COATED ORAL at 09:39

## 2020-11-09 RX ADMIN — ENOXAPARIN SODIUM 30 MG: 30 INJECTION SUBCUTANEOUS at 09:40

## 2020-11-09 RX ADMIN — POTASSIUM CHLORIDE 20 MEQ: 1500 TABLET, EXTENDED RELEASE ORAL at 09:40

## 2020-11-09 RX ADMIN — Medication 1 CAPSULE: at 09:40

## 2020-11-09 RX ADMIN — ATORVASTATIN CALCIUM 20 MG: 20 TABLET, FILM COATED ORAL at 21:34

## 2020-11-09 RX ADMIN — ACETAMINOPHEN 650 MG: 325 TABLET ORAL at 21:34

## 2020-11-09 RX ADMIN — ENOXAPARIN SODIUM 30 MG: 30 INJECTION SUBCUTANEOUS at 21:34

## 2020-11-09 RX ADMIN — FUROSEMIDE 40 MG: 40 TABLET ORAL at 09:40

## 2020-11-09 RX ADMIN — METOPROLOL TARTRATE 50 MG: 50 TABLET, FILM COATED ORAL at 09:40

## 2020-11-09 ASSESSMENT — PAIN SCALES - GENERAL
PAINLEVEL_OUTOF10: 0
PAINLEVEL_OUTOF10: 3

## 2020-11-09 NOTE — PROGRESS NOTES
[facility]  Occupational Therapy  Daily Note  Time:   Time In: 6585  Time Out: 1500  Timed Code Treatment Minutes: 27 Minutes  Minutes: 27    Date: 2020  Patient Name: Natty Moreno,   Gender: male      Room: [address] [place], [place] [address]-  MRN: [id number]  : 1934  (80 y.o.)  Referring Practitioner: Ordering: Pritesh Gallegos MD Attending: Dr. Gagan Godoy  Diagnosis: Covid 19  Additional Pertinent Hx: Per EMR, Mohamud Gandhi is a 80 y.o. male who presents to the Emergency Department for the evaluation of progressive weakness, fatigue, cough. Symptoms have been developing over the past 4 to 5 days. \" Pt admitted to TCU on 20. Restrictions/Precautions:  Restrictions/Precautions: General Precautions, Fall Risk  Position Activity Restriction  Other position/activity restrictions: COVID 19+, -Room air     SUBJECTIVE: Upon arrival patient was sitting up in recliner. Pt is very [place] and confusion noted. PAIN: denies    COGNITION: Slow Processing, Decreased Recall, Decreased Problem Solving and Decreased Safety Awareness    ADL:   Lower Extremity Dressing: Dependent.  don/doff socks. Esther Bermudez BALANCE:  Sitting Balance:  Stand By Assistance. sitting forward in recliner. Standing Balance: Contact Guard Assistance. BED MOBILITY:  Not Tested    TRANSFERS:  Sit to Stand:  [employer], cues for hand placement. Stand to Sit: Contact Guard Assistance. FUNCTIONAL MOBILITY:  Assistive Device: Rolling Walker  Assist Level:  Minimal Assistance. Distance: ~75 feet  Slow pace, unsteady, wide CARSON, decreased knee flexion, increased SOB. ADDITIONAL ACTIVITIES:  Vended theraband this session. Pt completed B UE exs with moderate resistance band x 10 reps, x 1 set, while following a handout. fair tolerance of exs, with RBs throughout, and mod cues for tech with resistance band. Exercises completed to increase indep and safety with all self care and transfers. weeks  Long term goal 1: Pt will complete BADL routine Mod indep with 0 vcs for safety and O2 stats remaining above 90% to increase indep and endurance in home environment. Long term goal 2: Pt will complete simple IADL with S and 0 vcs for safety to increase indep with getting a snack. Following session, patient left in safe position with all fall risk precautions in place.

## 2020-11-09 NOTE — PROGRESS NOTES
[facility] SKILLED NURSING UNIT  Speech - Language - Cognitive Evaluation    SLP Individual Minutes  Time In: 4338  Time Out: 0676  Minutes: 22  Timed Code Treatment Minutes: 0 Minutes       Date: 2020  Patient Name: Griffin Medel      CSN: [id number]   : 1934  (80 y.o.)  Gender: male   Referring Physician:  Edie Olszewski, MD   Diagnosis: COVID-19  Secondary Diagnosis: Cognitive deficits  Precautions: Fall risk, contact isolation/droplet precautions  History of Present Illness/Injury: Pt admitted to [facility] with above med dx; please refer to physician H&P for full details. Per chart review, \"80 y.o. male admitted to the transitional care unit on 2020. He came to the hospital with concerns of weakness. He was found to be covid-19 positive and was treated in the usual manor. Following becoming more medically stable he comes to the TCU for more strengthening before the return home\". ST consulted to assess cognitive domains given reports for increased difficulty with recall and safety awareness within alternate therapies. Past Medical History:   Diagnosis Date    Acute on chronic diastolic CHF (congestive heart failure), NYHA class 2 (HCC)     Arthritis     Atrial flutter (HCC) 6/15/2016    BPH (benign prostatic hyperplasia)     CAD (coronary artery disease)     Cancer (HCC)     skin    CKD (chronic kidney disease) stage 3, GFR 30-59 ml/min 2016    COPD with exacerbation ([facility] 6/15/2016    Hyperlipidemia     Hypertension     NSTEMI (non-ST elevated myocardial infarction) ([facility]     Obesity (BMI 35.0-39.9 without comorbidity) 2016    ARGENIS (obstructive sleep apnea)     uses cpap    S/P CABG x 3 2016    S/P cardiac cath: 2016: Tight LM lesion estimated at 90% along with the ostium of LAD. The ostium of the LCX seems to be significantly narrowed as well.  90% mid-RCA and 100% distal RCA 2016: Tight LM lesion estimated at 90% along with the ostium of LAD. The ostium of the LCX seems to be significantly narrowed as well. 90% mid-RCA and 100% distal RCA. IABP placed. Dr. Caryn Washburn TIA (transient ischemic attack)        Pain: No pain reported. Subjective:  Pt resting in recliner chair upon arrival, alert and pleasant. Hearing acuity levels notably reduced with plans to locate Pocket Talker to assist with hearing/comprehension in subsequent therapy session. SOCIAL HISTORY:   Living Arrangements: Deird[address] 5 with spouse; x3 children in the immediate area to provide supplemental assistance should be required  Work History: Retired; tile making  Education Level: High school, no college  Driving Status: Active   Finance Management: Independent  Medication Management: Independent; pillbox  ADL's: Independent. Hobbies: Golfing, international travel   Vision Status: Glasses  Hearing: WFL  Type of Home: House  Home Layout: Two level, Performs ADL's on one level, Able to Live on Main level with bedroom/bathroom  Home Access: Stairs to enter with rails  Entrance Stairs - Number of Steps: 1 CHRISTY with wilmer rails  Home Equipment: Rolling walker    ORAL MOTOR:  Facial / Labial WFL    Lingual WFL    Dentition WFL    Velum WFL    Vocal Quality WFL    Sensation WFL    Cough WFL      SPEECH / VOICE:  Speech and Voice appear to be grossly intact for basic and complex daily communication    LANGUAGE:  Receptive and Expressive:  Receptive and expressive language skills appear to be grossly intact for basic and complex daily communication. No apparent communicative breakdowns at dialogue level with pt successful for conveying desired message. Independent executions of multi-step commands throughout evaluation given repetition/rephrasing of auditory stimuli to ensure comprehension. Hearing acuity levels impact receptive language skills, however, it is not a true identified \"receptive language deficit\". COGNITION:  [place] Cognitive Assessment (MOCA) version 7.2 completed. Pt scored 20/30. Normal is greater than or equal to 26/30. *Inclusion of +1 point given highest level of education achieved less than/equal to 12th grade or GED with limited-0 post-secondary schooling   Orientation: 6/6  Immediate Recall: 3/5  Short-Term Recall: 0/5  Divergent Naming: 3 items named in 1 minute time frame, target-11  Problem Solvin/3  Reasonin/2 verbal, 0/2 visuospatial  Sequencin/1  Thought Organization: Impaired  Insight: Adequate,however, unsure of functional implications  Attention: Adequate sustained attention, impaired selective attention; likely attributable to reduced hearing acuity  Math Computation: 5/5 serial subtraction  Executive Functionin/3 clock drawing    SWALLOWING:  Current Diet: Regular with thin liquids with pt and nursing denying concerns r/t swallow function. RECOMMENDATIONS/ASSESSMENT:  DIAGNOSTIC IMPRESSIONS:  Pt presents with a mild cognitive impairment given impaired immediate/delayed recall, problem solving, sequential analysis, verbal/visual reasoning, executive functioning, thought organization, processing speed, and mental flexibility. Expressive and receptive language domains grossly intact with no apparent communicative breakdowns. Hearing acuity levels notably diminished with pt likely to benefit from external amplification (Pocket Talker). Skilled ST services are strongly recommended to address the above aforementioned impairments to permit safe re-integration into home environment and ADL/IADL completion with support of family.     Rehabilitation Potential: good    EDUCATION:  Learner: Patient  Education:  Reviewed results and recommendations of this evaluation, Reviewed ST goals and Plan of Care and Reviewed recommendations for follow-up  Evaluation of Education: Demonstrates with assistance, Needs further instruction and Family not present    PLAN:  Skilled SLP intervention on TCU 30 minutes per day x3-5 days per week. Specific interventions for next session may include: cognitive tasks    PATIENT GOAL:    Return to prior level of function. SHORT TERM GOALS:  Short-term Goals  Timeframe for Short-term Goals: 1 week  Goal 1: Pt will complete functional immediate/delayed recall (focus on compensatory strategies) tasks with 70% accuracy given mod cues to improve retention of pertinent information. Goal 2: Pt will complete problem solving, sequential analysis, and verbal/visual reasoning (time, money/math/finances, medications) tasks with 70% accuracy given mod cues to improve independence within ADL/IADL completion. Goal 3: Pt will complete thought organization and divergent thinking tasks with 70% accuracy given mod cues/naming 5 items in 1 minute time frame across 2/3 trials (no cues) to improve processing speed and mental flexibilty. Goal 4: Pt will complete moderately complex attention tasks with no more than 3 errors until task completion to permit potential return to driving and IADL management. LONG TERM GOALS:  Long-term Goals  Timeframe for Long-term Goals: 3 weeks  Goal 1: Pt will improve cognitive functioning to a supervision level or higher to optimize cognitive performacne for safe return to home setting and ADL/IADL completion with support of family.         Carmen Clark M.A., [address] [place] [address]

## 2020-11-09 NOTE — PLAN OF CARE
Problem: Gas Exchange - Impaired  Goal: Absence of hypoxia  Outcome: Ongoing  Pt is not hypoxic, pt is on room air and maintaining 02 sat above 90%, encourages use of incentive spirometry, pt demonstrates use of incentive spirometer. Problem: Body Temperature -  Risk of, Imbalanced  Goal: Ability to maintain a body temperature within defined limits  Outcome: Ongoing  Pt has temp of 99.5, given tylenol pts temp 1.5 hours later still at 99.5     Problem: Isolation Precautions - Risk of Spread of Infection  Goal: Prevent transmission of infection  Outcome: Ongoing  Continuing to be in isolation precautions, also uses mask when leaving room to further prevent transmission. Problem: Risk for Fluid Volume Deficit  Goal: Maintain normal heart rhythm  Outcome: Ongoing   VS stable this shift. Problem: Falls - Risk of:  Goal: Will remain free from falls  Description: Will remain free from falls  Outcome: Ongoing  Pt is 1 assist with walker and gait belt. Pt is slow but steady. Problem: Pain:  Goal: Pain level will decrease  Description: Pain level will decrease  Outcome: Ongoing  Pt denies pain this shift.      Problem: Urinary Elimination:  Goal: Skin integrity will improve  Description: Skin integrity will improve  Outcome: Ongoing  Pt needs assistance when urinating, pt uses urinal.

## 2020-11-09 NOTE — PROGRESS NOTES
[address] [address]  INPATIENT PHYSICAL THERAPY  DAILY NOTE  - [place] SKILLED NURSING UNIT - [id number]    Time In: 1127  Time Out: 1210  Timed Code Treatment Minutes: 37 Minutes  Minutes: 43          Date: 2020  Patient Name: Sue Clarke,  Gender:  male        MRN: [id number]  : 1934  (80 y.o.)     Referring Practitioner: Attending: Katheryn Lloyd MD; Ordering: Sapna Foote MD  Diagnosis: COVID-19  Additional Pertinent Hx: Per EMR, Alexsandra Sellers is a 80 y.o. male who presents to the Emergency Department for the evaluation of progressive weakness, fatigue, cough. Symptoms have been developing over the past 4 to 5 days. \"     Prior Level of Function:  Lives With: Spouse  Type of Home: House  Home Layout: Two level, Performs ADL's on one level, Able to Live on Main level with bedroom/bathroom  Home Access: Stairs to enter with rails  Entrance Stairs - Number of Steps: 1 CHRISTY with wilmer rails  Home Equipment: Rolling walker   Bathroom Shower/Tub: Tub/Shower unit, Shower chair with back  Bathroom Toilet: Handicap height  Bathroom Equipment: Hand-held shower  Bathroom Accessibility: Accessible    ADL Assistance: Independent(Pt reports Indep with dressing, toileting, and showers.)  Homemaking Assistance: Needs assistance(Spouse complete cleaning and laundry. Kids bring in meals frequently or they go out to eat.)  Homemaking Responsibilities: Yes  Ambulation Assistance: Independent  Transfer Assistance: Independent  Active : Yes    Restrictions/Precautions:  Restrictions/Precautions: General Precautions, Fall Risk  Position Activity Restriction  Other position/activity restrictions: COVID 19+, -Room air     SUBJECTIVE: Patient in bedsdie chair upon arrival. Patient requested to use restroom during session, slightly incontinent of bowel. Confusion noted.     PAIN: denies    OBJECTIVE:  Bed Mobility:  Not Tested    Transfers:  Sit to Stand: Contact Guard Assistance  Stand to Sit:Contact Guard Assistance, slow lower to toilet seat    Ambulation:  Contact Guard Assistance, Minimal Assistance, X 1, with cues for safety, with verbal cues , with increased time for completion  Distance: ~15ft x2, ~80ft x1  Surface: Level Tile  Device:Rolling Walker  Gait Deviations: Forward Flexed Posture, Slow Nahed, very slow, Decreased Step Length Bilaterally, Decreased Weight Shift Bilaterally, Decreased Trunk Rotation, Decreased Gait Speed, Decreased Heel Strike Bilaterally, Wide Base of Support, Moderate Path Deviations, Unsteady Gait and decreased hip and knee flexion, cues to keep walker closer to body for safety. Balance:  Static Standing Balance: Stand By Assistance, Contact Guard Assistance, X 1, stood for pericare, BUE support on RW, very forward flexed posture    Exercise:  Patient was guided in 1 set(s) 15 reps of exercise to both lower extremities. Ankle pumps, Quad sets, Heelslides, Hip abduction/adduction, Straight leg raises, Seated marches, Seated heel/toe raises and Long arc quads. Exercises were completed for increased independence with functional mobility. Functional Outcome Measures: Not completed       ASSESSMENT:  Assessment: Patient progressing toward established goals. and unsteady with ambulation. Confusion noted. Hard of hearing. Activity Tolerance:  Patient tolerance of  treatment: fair. Equipment Recommendations: Other: monitor for needs  Discharge Recommendations:  Continue to assess pending progress    Plan: Times per week: 6x  Current Treatment Recommendations: Strengthening, Balance Training, Endurance Training, Functional Mobility Training, Transfer Training, Gait Training, Stair training, Patient/Caregiver Education & Training, Safety Education & Training, Home Exercise Program    Patient Education  Patient Education: Plan of Care, Equipment Education, Transfers, Reviewed Prior Education, Gait, Up in Chair for Loan Anaya, Verbal Exercise Instruction    Goals:  Patient goals : go home  Short term goals  Time Frame for Short term goals: 1 week  Short term goal 1: Pt to be Mod I for supine <> sit to get in/out of bed  Short term goal 2: Pt to be Mod I for sit <> stand to get up to ambulate  Short term goal 3: Pt to ambulate > 150 ft with RW with Supervision for household distances  Short term goal 4: Pt to negotiate 1 step with 1-2 rails or RW with SBA for home access  Long term goals  Time Frame for Long term goals : not set due to short ELOS    Following session, patient left in safe position with all fall risk precautions in place.

## 2020-11-09 NOTE — PROGRESS NOTES
Certification for TCU Admission    Name:  Flores Olsen    MR#:    [id number]  Acct: [de-identified]      :   1934    Long Term Care Facility Type: Transitional Care Unit     Dx: [id number]     Patient Active Problem List   Diagnosis    MOON (acute kidney injury) ([facility] [address]    Obstructive sleep apnea    CKD (chronic kidney disease) stage 3, GFR 30-59 ml/min    S/P cardiac cath: 2016: Tight LM lesion estimated at 90% along with the ostium of LAD. The ostium of the LCX seems to be significantly narrowed as well.  90% mid-RCA and 100% distal RCA    Coronary artery disease involving native coronary artery of native heart with angina pectoris (HCC)    History of Clostridium difficile colitis    Physical deconditioning    S/P CABG (coronary artery bypass graft)    Sepsis ([facility] [facility]    Coronary artery disease involving coronary bypass graft of native heart without angina pectoris    Hx of non-ST elevation myocardial infarction (NSTEMI)    History of TIA (transient ischemic attack) and stroke    DNR no code (do not resuscitate)    Urinary tract infection without hematuria    Calculus of kidney    Right ureteral stone    Actinic keratosis    Body mass index (bmi) 38.0-38.9, adult    BPH (benign prostatic hyperplasia)    Dietary counseling and surveillance    Essential (primary) hypertension    Basal cell carcinoma of skin of left ear and external auricular canal    Vitamin D deficiency    COVID-19    Muscular deconditioning    Class 2 severe obesity due to excess calories with serious comorbidity and body mass index (BMI) of 39.0 to 39.9 in adult (HCC)    COPD (chronic obstructive pulmonary disease) (HCC)    HLD (hyperlipidemia)       Code Status: Full Code      Rehabilitation Potential: Good     Prognosis: Good     Stability: Stable     History & Physical current: Yes   If No, note changes in H&P update     Level of Care Recommendation/Request: Skilled     Physician Certification: I certify that I have reviewed the information contained in the discharge summary and that the information is a true and accurate reflection of the individual's condition. I certify this resident is appropriate for skilled services provided in the TCU/ECF for a condition which the individual received inpatient care. Certification: I certify the orders, information, and transfer of said patient is necessary for the continuing treatment of the diagnosis listed in a skilled care setting providing skilled nursing and/or skilled rehabilitative services. The patient will require on a daily basis SNF covered care.     Electronically signed by Jamshid Garsia MD on 11/9/2020 at 3:23 PM

## 2020-11-09 NOTE — DISCHARGE SUMMARY
24 hour intake/output:No intake or output data in the 24 hours ending 11/09/20 1548      General appearance:  No apparent distress, appears stated age and cooperative. Overweight. HEENT:  Normal cephalic, atraumatic without obvious deformity. Pupils equal, round, and reactive to light. Extra ocular muscles intact. Conjunctivae/corneas clear. Neck: Supple, with full range of motion. No jugular venous distention. Trachea midline. Respiratory:  Normal respiratory effort. Clear to auscultation, bilaterally without Rales/Wheezes/Rhonchi. Cardiovascular:  Regular rate and rhythm with normal S1/S2 without murmurs, rubs or gallops. Abdomen: Soft, non-tender, non-distended with normal bowel sounds. Musculoskeletal:  No clubbing, cyanosis or edema bilaterally. Full range of motion without deformity. Skin: Skin color, texture, turgor normal.  No rashes or lesions. Neurologic:  Neurovascularly intact without any focal sensory/motor deficits. Cranial nerves: II-XII intact, grossly non-focal.  Psychiatric:  Alert and oriented, thought content appropriate, normal insight      Labs: For convenience and continuity at follow-up the following most recent labs are provided:      CBC:    Lab Results   Component Value Date    WBC 3.6 11/07/2020    HGB 12.4 11/07/2020    HCT 39.0 11/07/2020     11/07/2020       Renal:    Lab Results   Component Value Date     11/07/2020    K 4.2 11/07/2020    K 4.6 11/05/2020     11/07/2020    CO2 20 11/07/2020    BUN 23 11/07/2020    CREATININE 1.1 11/07/2020    CALCIUM 7.6 11/07/2020    PHOS 3.2 03/22/2017         Significant Diagnostic Studies    Radiology:   XR CHEST PORTABLE   Final Result   1. Normal heart size. Metallic sternotomy sutures from prior surgery. 2. No acute findings. No infiltrates or effusions are seen            **This report has been created using voice recognition software.   It may contain minor errors which are inherent in voice recognition technology. **      Final report electronically signed by Dr. Abigail Merida on 11/4/2020 2:30 PM             Consults:     [address] [place] [address] TO REHAB/TCU ADMISSION COORDINATOR    Disposition: TCU  Condition at Discharge: Stable    Code Status:  Full Code     Patient Instructions:    Discharge lab work: Activity: activity as tolerated  Diet: No diet orders on file      Follow-up visits:   No follow-up provider specified. Discharge Medications:      [facility] [place] Medication Instructions DLX:[id number]    Printed on:11/09/20 1548   Medication Information                      aspirin 81 MG tablet  Take 81 mg by mouth daily              atorvastatin (LIPITOR) 20 MG tablet  Take 1 tablet by mouth nightly             Cholecalciferol (VITAMIN D PO)  Take 1,000 Units by mouth 2 times daily Vit D3             finasteride (PROSCAR) 5 MG tablet  Take 5 mg by mouth daily             furosemide (LASIX) 40 MG tablet  Take 1 tablet by mouth daily             Lactobacillus (ACIDOPHILUS/PECTIN PO)  Take by mouth             metoprolol tartrate (LOPRESSOR) 50 MG tablet  Take 1 tablet by mouth 2 times daily             Multiple Vitamins-Minerals (THERAPEUTIC MULTIVITAMIN-MINERALS) tablet  Take 1 tablet by mouth daily             potassium chloride (KLOR-CON M) 20 MEQ TBCR extended release tablet  Take 20 mEq by mouth                 Time Spent on discharge is more than 20 minutes in the examination, evaluation, counseling and review of medications and discharge plan. Signed: Thank you PEPITO Acosta CNP for the opportunity to be involved in this patient's care.     Electronically signed by Tanesha Mojica MD on 11/9/2020 at 3:48 PM

## 2020-11-09 NOTE — PATIENT CARE CONFERENCE
[facility]  Transitional Care Unit  Team Conference Note    Patient Name: Cydney Perla   MRN: [id number]    : 1934  (80 y.o.)  Gender: male   Referring Practitioner: Attending: Maddie Lujan MD; Ordering: Alexis Mejia MD  Diagnosis: COVID-19    Team Conference Date: 11/10/2020   Admission Date:   7301 16:41 PM  Re-Certification Date:     :  Tentative Discharge Plan and current psychosocial issues: Patient admitted to TCU following a stay on acute for COVID. Patient lives with his wife in a private residence. Patient and his wife are planning for patient to return home once he is able to discharge from [facility]. He was independent with all care prior to his hospitalization. SW will continue to monitor progress and maintain contact with patient and patient's family regarding length of stay and recommendations. SW will continue to assist with ongoing discharge planning. Nursing:     Weight:  Weight: has been stable     Wounds/Incisions/Ulcers:  No skin/wound issues identified  Bowel: continent  Bladder:continent     Pain: Patient's pain is currently controlled with tylenol    Education Provided:  Diabetic:  No  Peg Tube:No    Clements Care:  Education:NA  Removal Necessary:  NA  Supplies Needed: NA     Anticoagulant Use:  Patient on lovenox for anticoagulation, which is being managed by Primary Care Physician    Antibiotic Use:  Patient not on antibiotics    Psychotropic Medication Use:  Patient not on psychotropic medications. Oxygen Use: No    Home Medications Reconciled: N/A    Physical Therapy:   Patient with limited progress due to short length of stay on TCU. Patient requiring CGA with sit to stand, and ambulating up to [de-identified]' with a RW and CGA/min assist.  Patient limited by decreased activity tolerance and confusion.   Patient would benefit from continued skilled PT services to improve his ability to complete functional mobility, reduce his risk for falls and allow patient to independent PLOF. PT Equipment Recommendations  Other: monitor for needs    Occupational  Therapy:  Pt is making limited progress towards goals d/t short length of stay. Factors facilitating goal achievement include: coorperative, family support . Barriers to goal achievement include: Hopi, endurance, decreased cognition. Family education has been addressed on the following topics: N/A. Pt continues to require skilled Occupational Therapy to address the following performance deficits dynamic standing balance, cognition, endurance, safety awareness, and activity tolerance. Without skilled OT intervention pt is at risk for functional decline, increased falls, and readmission to hospital.   Equipment: Monitor needs      Speech Therapy: For this therapy reporting period, 0/4 STGs and 0/2 LTGs achieved; limited goal attainment s/t recently initiated ST POC on 11/9/2020. Per evaluation, pt presents with a mild cognitive impairment (MOCA 20/30) given impaired immediate/delayed recall, problem solving, sequential analysis, verbal/visual reasoning, executive functioning, thought organization, processing speed, and mental flexibility. Expressive and receptive language domains grossly intact with no apparent communicative breakdowns. Hearing acuity levels notably diminished with pt likely to benefit from external amplification (Pocket Talker). Skilled ST services are strongly recommended to address the above aforementioned impairments to permit safe re-integration into home environment and ADL/IADL completion with support of family. Nutrition:    Weight: 256 lb 9.6 oz (116.4 kg) / Body mass index is 39.02 kg/m². Please see nutrition note for details.     Family Education: No family available for education  Fall Risk:  Falling star program initiated    Goal Achievement:   Patient Continues to progress toward goal achievement    Discharge Plan   Estimated Length of Stay: re eval  Destination: home health  Services at Discharge: [facility], Occupational Therapy, Speech Therapy, Nursing and aide 3x week  Equipment at Discharge: No equipment needs  Factors facilitating achievement of predicted outcomes: Family support, Motivated, Cooperative and Pleasant  Barriers to the achievement of predicted outcomes: Cognitive deficit, Decreased endurance, Decreased proprioception, Upper extremity weakness, Lower extremity weakness, Long standing deficits and Medical complications    Readmission Risk              Risk of Unplanned Readmission:        16         High (20-31)     Moderate (10-19)     Low (0-9)    Team Members Present at Conference:  Physician: Dr. Adryan Herrera MD  Nurse: Rubi Contreras RN, Mitali MEZAN  :  John Osborn [place]  Occupational Therapist:  GERMÁN Small  Physical Therapist:   Anu Schneider PT  Speech Therapist: Speech Therapist: N/A. All team members have reviewed and updated as necessary and appropriate the established interdisciplinary plan of care within the medical record of Claire Garcia. Pt's team conference attended (see above). Pt seen on rounds with LSW, to review the results with patient and family. Discussed length of stay as above. Pt's team conference attended (see above.)  Pt seen on rounds with LSW, to review the results with patient and family. Discussed length of stay as above.     Physical Exam:  General:  Alert and oriented  Vitals:   Vitals:    11/10/20 0800   BP: (!) 142/78   Pulse: 80   Resp: 18   Temp: 99 °F (37.2 °C)   SpO2: 94%     Heart:  Regular rate and rhythm  Lungs:  Clear to auscultation  Abdomen:  soft with positive bowel sounds   Skin atrophic  Head normocephalic  Membranes moist  Neuro weak  Psy cooperative    Assessment:  Progressing in full rehabilitation program (see above)    Plan:  Continue Rehab            Continue current medications            Spent 37 minutes today in patient management and care    Electronically signed by Roddy Grant MD on 11/10/2020 at 8:24 AM

## 2020-11-09 NOTE — PROGRESS NOTES
Spoke with daughter(s) to update on patient condition and discuss discharge planning needs.     Information shared included: general info, diet, ambulating, hearing aid3    Patient medical condition: no    Therapy progress: no    Current goals: no    Identified medical needs at home (diabetes equipment, training for family to provide care, etc.): pt wants hearing aid brought in    Barriers for discharge (home accommodations, health of family members, etc.): not at this time

## 2020-11-10 LAB
ANION GAP SERPL CALCULATED.3IONS-SCNC: 10 MEQ/L (ref 8–16)
BASOPHILS # BLD: 0.2 %
BASOPHILS ABSOLUTE: 0 THOU/MM3 (ref 0–0.1)
BUN BLDV-MCNC: 20 MG/DL (ref 7–22)
CALCIUM SERPL-MCNC: 7.9 MG/DL (ref 8.5–10.5)
CHLORIDE BLD-SCNC: 105 MEQ/L (ref 98–111)
CO2: 24 MEQ/L (ref 23–33)
CREAT SERPL-MCNC: 1.2 MG/DL (ref 0.4–1.2)
EOSINOPHIL # BLD: 1.2 %
EOSINOPHILS ABSOLUTE: 0.1 THOU/MM3 (ref 0–0.4)
ERYTHROCYTE [DISTWIDTH] IN BLOOD BY AUTOMATED COUNT: 13.2 % (ref 11.5–14.5)
ERYTHROCYTE [DISTWIDTH] IN BLOOD BY AUTOMATED COUNT: 48.6 FL (ref 35–45)
GFR SERPL CREATININE-BSD FRML MDRD: 57 ML/MIN/1.73M2
GLUCOSE BLD-MCNC: 92 MG/DL (ref 70–108)
HCT VFR BLD CALC: 35.9 % (ref 42–52)
HEMOGLOBIN: 11.5 GM/DL (ref 14–18)
IMMATURE GRANS (ABS): 0.02 THOU/MM3 (ref 0–0.07)
IMMATURE GRANULOCYTES: 0.4 %
LYMPHOCYTES # BLD: 15.8 %
LYMPHOCYTES ABSOLUTE: 0.8 THOU/MM3 (ref 1–4.8)
MCH RBC QN AUTO: 32 PG (ref 26–33)
MCHC RBC AUTO-ENTMCNC: 32 GM/DL (ref 32.2–35.5)
MCV RBC AUTO: 100 FL (ref 80–94)
MONOCYTES # BLD: 3.8 %
MONOCYTES ABSOLUTE: 0.2 THOU/MM3 (ref 0.4–1.3)
NUCLEATED RED BLOOD CELLS: 0 /100 WBC
PLATELET # BLD: 217 THOU/MM3 (ref 130–400)
PMV BLD AUTO: 9.5 FL (ref 9.4–12.4)
POTASSIUM SERPL-SCNC: 4.4 MEQ/L (ref 3.5–5.2)
PROCALCITONIN: 0.16 NG/ML (ref 0.01–0.09)
RBC # BLD: 3.59 MILL/MM3 (ref 4.7–6.1)
SEG NEUTROPHILS: 78.6 %
SEGMENTED NEUTROPHILS ABSOLUTE COUNT: 3.9 THOU/MM3 (ref 1.8–7.7)
SODIUM BLD-SCNC: 139 MEQ/L (ref 135–145)
WBC # BLD: 4.9 THOU/MM3 (ref 4.8–10.8)

## 2020-11-10 PROCEDURE — 6370000000 HC RX 637 (ALT 250 FOR IP): Performed by: FAMILY MEDICINE

## 2020-11-10 PROCEDURE — 97530 THERAPEUTIC ACTIVITIES: CPT

## 2020-11-10 PROCEDURE — 84145 PROCALCITONIN (PCT): CPT

## 2020-11-10 PROCEDURE — 6370000000 HC RX 637 (ALT 250 FOR IP): Performed by: INTERNAL MEDICINE

## 2020-11-10 PROCEDURE — 97116 GAIT TRAINING THERAPY: CPT

## 2020-11-10 PROCEDURE — 94760 N-INVAS EAR/PLS OXIMETRY 1: CPT

## 2020-11-10 PROCEDURE — 36415 COLL VENOUS BLD VENIPUNCTURE: CPT

## 2020-11-10 PROCEDURE — 94660 CPAP INITIATION&MGMT: CPT

## 2020-11-10 PROCEDURE — 85025 COMPLETE CBC W/AUTO DIFF WBC: CPT

## 2020-11-10 PROCEDURE — 97535 SELF CARE MNGMENT TRAINING: CPT

## 2020-11-10 PROCEDURE — 0220000000 HC SKILLED NURSING FACILITY

## 2020-11-10 PROCEDURE — 97110 THERAPEUTIC EXERCISES: CPT

## 2020-11-10 PROCEDURE — 2700000000 HC OXYGEN THERAPY PER DAY

## 2020-11-10 PROCEDURE — 1290000000 HC SEMI PRIVATE OTHER R&B

## 2020-11-10 PROCEDURE — 6360000002 HC RX W HCPCS: Performed by: INTERNAL MEDICINE

## 2020-11-10 PROCEDURE — 51798 US URINE CAPACITY MEASURE: CPT

## 2020-11-10 PROCEDURE — 80048 BASIC METABOLIC PNL TOTAL CA: CPT

## 2020-11-10 RX ADMIN — METOPROLOL TARTRATE 50 MG: 50 TABLET, FILM COATED ORAL at 22:13

## 2020-11-10 RX ADMIN — Medication 1 CAPSULE: at 10:11

## 2020-11-10 RX ADMIN — FINASTERIDE 5 MG: 5 TABLET, FILM COATED ORAL at 10:11

## 2020-11-10 RX ADMIN — MICONAZOLE NITRATE: 20 POWDER TOPICAL at 22:14

## 2020-11-10 RX ADMIN — ENOXAPARIN SODIUM 30 MG: 30 INJECTION SUBCUTANEOUS at 22:13

## 2020-11-10 RX ADMIN — ATORVASTATIN CALCIUM 20 MG: 20 TABLET, FILM COATED ORAL at 22:13

## 2020-11-10 RX ADMIN — METOPROLOL TARTRATE 50 MG: 50 TABLET, FILM COATED ORAL at 10:11

## 2020-11-10 RX ADMIN — FUROSEMIDE 40 MG: 40 TABLET ORAL at 10:11

## 2020-11-10 RX ADMIN — ASPIRIN 325 MG: 325 TABLET, FILM COATED ORAL at 10:12

## 2020-11-10 RX ADMIN — ACETAMINOPHEN 650 MG: 325 TABLET ORAL at 22:13

## 2020-11-10 RX ADMIN — FAMOTIDINE 20 MG: 20 TABLET, FILM COATED ORAL at 10:12

## 2020-11-10 RX ADMIN — ENOXAPARIN SODIUM 30 MG: 30 INJECTION SUBCUTANEOUS at 10:12

## 2020-11-10 RX ADMIN — MICONAZOLE NITRATE: 20 POWDER TOPICAL at 10:19

## 2020-11-10 RX ADMIN — POTASSIUM CHLORIDE 20 MEQ: 1500 TABLET, EXTENDED RELEASE ORAL at 10:12

## 2020-11-10 ASSESSMENT — PAIN SCALES - GENERAL
PAINLEVEL_OUTOF10: 0
PAINLEVEL_OUTOF10: 0

## 2020-11-10 NOTE — CONSULTS
Comprehensive Nutrition Assessment    Type and Reason for Visit:  Initial, Consult, Positive Nutrition Screen(New TCU Admit/Poor Appetite/Intake)    Nutrition Recommendations/Plan:   *Recommend a Multivitamin w/minerals daily. *Started Ensure Enlive TID. *Continue current diet as ordered. Nutrition Assessment: Pt. nutritionally compromised AEB pt with poor oral intake over the past week since admit and at present. At risk for further nutrition compromise r/t COVID+, underlying medical condition (CAD, CHF, Skin Cancer, CKD, COPD, HLD, HTN and Obesity) and need for nutrition support. Nutrition recommendations/interventions as per above. Malnutrition Assessment:  Malnutrition Status:  Insufficient data(COVID)    Context:  Acute Illness     Findings of the 6 clinical characteristics of malnutrition:  Energy Intake:  7 - 50% or less of estimated energy requirements for 5 or more days  Weight Loss:  No significant weight loss     Body Fat Loss:  Unable to assess(COVID)     Muscle Mass Loss:  Unable to assess(COVID)    Fluid Accumulation:  No significant fluid accumulation Extremities   Strength:  Not Performed    Estimated Daily Nutrient Needs:  Energy (kcal):  5600-8239- kcal/day (30-32 kcal/kg); Weight Used for Energy Requirements:  (70 kg IBW)     Protein (g):   g/day (1.2-2 g/kg); Weight Used for Protein Requirements:  (70 kg IBW)        Fluid (ml/day):  per MD    Nutrition Related Findings:  COVID+; called pt x2 with no answer; spoke with RN, she reports pt with poor oral intake consuming 25% of most meals over the past week; per RN, pt is not a big eaterand likes to snack; last BM x1 on 11/9. Rx includes: Culturelle, Colace PRN      Wounds:  None       Current Nutrition Therapies:    DIET GENERAL;     Anthropometric Measures:  · Height: 5' 8\" (172.7 cm)  · Current Body Weight: 256 lb 9.6 oz (116.4 kg)(11/9; +trace edema BLE)   · Admission Body Weight: 257 lb 4.8 oz (116.7 kg)(11/6; +trace edema BLE)    · Usual Body Weight: (Per EMR: 256 lb 12.8 oz on 11/4/20; 262 lb 3.2 oz on 6/12/20)     · Ideal Body Weight: 154 lbs  · BMI: 39  · BMI Categories: Obese Class 2 (BMI 35.0 -39.9)       Nutrition Diagnosis:   · Inadequate oral intake related to inadequate protein-energy intake as evidenced by poor intake prior to admission    Nutrition Interventions:   Food and/or Nutrient Delivery:  Continue Current Diet, Start Oral Nutrition Supplement, Vitamin Supplement  Nutrition Education/Counseling:  No recommendation at this time   Coordination of Nutrition Care:  Continue to monitor while inpatient    Goals:  Pt will consume 75% or more of meals during LOS       Nutrition Monitoring and Evaluation:   Behavioral-Environmental Outcomes:  None Identified   Food/Nutrient Intake Outcomes:  Diet Advancement/Tolerance, Food and Nutrient Intake, Supplement Intake, Vitamin/Mineral Intake  Physical Signs/Symptoms Outcomes:  Biochemical Data, Weight, Skin, Nutrition Focused Physical Findings, Fluid Status or Edema, GI Status     Discharge Planning:     Too soon to determine     Electronically signed by Nataliia Parry RD, LD on 11/10/20 at 2:31 PM EST    Contact: [phone]

## 2020-11-10 NOTE — PROGRESS NOTES
[place]  [address] [facility]  Occupational Therapy  Daily Note  Time:   Time In: 1103  Time Out: 1500  Timed Code Treatment Minutes: 37 Minutes  Minutes: 43          Date: 11/10/2020  Patient Name: Bharti Alicia,   Gender: male      Room: [address] [place], [place] [address]-  MRN: [id number]  : 1934  (80 y.o.)  Referring Practitioner: Ordering: Prudencio Sumner MD Attending: Dr. Chico Titus  Diagnosis: Covid 19  Additional Pertinent Hx: Per EMR, Carlene Coon is a 80 y.o. male who presents to the Emergency Department for the evaluation of progressive weakness, fatigue, cough. Symptoms have been developing over the past 4 to 5 days. \" Pt admitted to TCU on 20. Restrictions/Precautions:  Restrictions/Precautions: General Precautions, Fall Risk  Position Activity Restriction  Other position/activity restrictions: COVID 19+, -Room air     SUBJECTIVE: Up in chair upon arrival, agreeable to OT session, Confusion noted    Patient on Room Air  upon arrival to room. Patient O2 sats at 93 %. Upon activity patient maintaining O2 at 91 %. Pt requiring minimal rest break(s) to recover. PAIN: 0/10:     COGNITION: Slow Processing and Decreased Problem Solving    ADL:   Grooming: Minimal Assistance. A for thoroughness of shaving, difficulty sequencing task  Toileting: Minimal Assistance. A for placement of urinal, incontinent of bladder as well. BALANCE:  Standing Balance: Contact Guard Assistance. approx 1 1/2 minutes    BED MOBILITY:  Not Tested    TRANSFERS:  Sit to Stand:  Contact Guard Assistance. bedside chair  Stand to Sit: [address] ADDITIONAL ACTIVITIES:  Patient identified a personal goal to increase UB strength and improve overall endurance so they can complete their toilet & shower transfers; skilled edu on UE strengthening and patient completed BUE strengthening exercises x10 reps x1 set this date with a resistive band in all joints and all planes.  Patient tolerated well. ASSESSMENT:     Activity Tolerance:  Patient tolerance of  treatment: good. Discharge Recommendations: Home with Home health OT, Continue to assess pending progress   Equipment Recommendations: Equipment Needed: No  Other: Monitor needs  Plan: Times per week: 6x  Current Treatment Recommendations: Strengthening, Endurance Training, Balance Training, Functional Mobility Training, Patient/Caregiver Education & Training, Self-Care / ADL, Home Management Training, Safety Education & Training    Patient Education  Patient Education: ADL's, Home Exercise Program and Orientation    Goals  Short term goals  Time Frame for Short term goals: 2 weeks  Short term goal 1: Pt will complete BUE light resistive exercises with min vcs for safety to increase indep and safety with all self cares and transfers. Short term goal 2: Pt will complete standing tolerance x3 minutes with 1-2 UE and SBA release to increase indep and safety with all grooming. Short term goal 3: Pt will complete functional mobility to/from BR and HH distances with SBA and min vcs for safety to increase indep and safety with all toileting. Short term goal 4: Pt will complete LB dressing with LHAE and min A to increase indep and safety with all self cares. Long term goals  Time Frame for Long term goals : 4 weeks  Long term goal 1: Pt will complete BADL routine Mod indep with 0 vcs for safety and O2 stats remaining above 90% to increase indep and endurance in home environment. Long term goal 2: Pt will complete simple IADL with S and 0 vcs for safety to increase indep with getting a snack. Following session, patient left in safe position with all fall risk precautions in place.

## 2020-11-10 NOTE — PLAN OF CARE
Problem: DISCHARGE BARRIERS: Team conference this am, team recommendations explained to patient by this SW and Dr. Ramses Styles. Team recommends for patient to remain on TCU through next team conference. Team recommends [place] and continued therapies once patient discharges. SW will continue to monitor progress and maintain contact with patient and patient's family regarding length of stay and recommendations. SW will continue to assist with ongoing discharge planning.    Goal: Patient's continuum of care needs is met  Outcome: Ongoing

## 2020-11-10 NOTE — PROGRESS NOTES
Spoke with wife to update on patient condition and discuss discharge planning needs. Information shared included: therapy progress, % meals, activities    Patient medical condition: needs to gain endurance and strength    Therapy progress: participating in therapy    Current goals: ongoing    Identified medical needs at home (diabetes equipment, training for family to provide care, etc.): na    Barriers for discharge (home accommodations, health of family members, etc.): strength and endurance   Pt reads newspaper daily, can we provide this. Poncho Gomez

## 2020-11-10 NOTE — PLAN OF CARE
Problem: Gas Exchange - Impaired  Goal: Absence of hypoxia  Outcome: Ongoing  Pt is not hypoxic on room air       Problem: Body Temperature -  Risk of, Imbalanced  Goal: Ability to maintain a body temperature within defined limits  Outcome: Ongoing     Problem: Isolation Precautions - Risk of Spread of Infection  Goal: Prevent transmission of infection  Outcome: Ongoing  Pt is continuing on isolation precautions     Problem: Falls - Risk of:  Goal: Will remain free from falls  Description: Will remain free from falls  Outcome: Ongoing  Pt will remain free of falls. Pt is 1 assist with walker and gait belt, pt can be unsteady at times. Problem: Skin Integrity:  Goal: Will show no infection signs and symptoms  Description: Will show no infection signs and symptoms  Outcome: Ongoing  Pt has no additional skin breakdowns at this time. Problem: Urinary Elimination:  Goal: Skin integrity will improve  Description: Skin integrity will improve  Outcome: Ongoing  Pt has small frequent voids of 100cc or less, bladder scan ordered 238ml showing, Dr. Antonino Bertrand is ordering that bladder scan is repeated on Wednesday x 1.

## 2020-11-10 NOTE — PROGRESS NOTES
[address] [address]  INPATIENT PHYSICAL THERAPY  DAILY NOTE  - [facility] - [id number]    Time In: 1006  Time Out: 1100  Timed Code Treatment Minutes: 47 Minutes  Minutes: 54          Date: 11/10/2020  Patient Name: Philomena Castro,  Gender:  male        MRN: [id number]  : 1934  (80 y.o.)     Referring Practitioner: Attending: Kunal Clemons MD; Ordering: Claudene Stabs, MD  Diagnosis: COVID-19  Additional Pertinent Hx: Per EMR, Gala Schmidt is a 80 y.o. male who presents to the Emergency Department for the evaluation of progressive weakness, fatigue, cough. Symptoms have been developing over the past 4 to 5 days. \"     Prior Level of Function:  Lives With: Spouse  Type of Home: House  Home Layout: Two level, Performs ADL's on one level, Able to Live on Main level with bedroom/bathroom  Home Access: Stairs to enter with rails  Entrance Stairs - Number of Steps: 1 CHRISTY with wilmer rails  Home Equipment: Rolling walker   Bathroom Shower/Tub: Tub/Shower unit, Shower chair with back  Bathroom Toilet: Handicap height  Bathroom Equipment: Hand-held shower  Bathroom Accessibility: Accessible    ADL Assistance: Independent(Pt reports Indep with dressing, toileting, and showers.)  Homemaking Assistance: Needs assistance(Spouse complete cleaning and laundry. Kids bring in meals frequently or they go out to eat.)  Homemaking Responsibilities: Yes  Ambulation Assistance: Independent  Transfer Assistance: Independent  Active : Yes    Restrictions/Precautions:  Restrictions/Precautions: General Precautions, Fall Risk  Position Activity Restriction  Other position/activity restrictions: COVID 19+, -Room air     SUBJECTIVE: Patient in bathroom with nursing tech upon arrival. Assisted patient back to bed for bladder scan. Patient agreeable to therapy.      PAIN: denies    OBJECTIVE:  Bed Mobility:  Supine to Sit: Minimal Assistance, with head of bed raised, with verbal cues , with increased time for completion  Sit to Supine: Stand By Assistance, Contact Guard Assistance, X 1, with verbal cues , with increased time for completion   Scooting: Stand By Assistance, with increased time for completion    Transfers:  Sit to Stand: Minimal Assistance, with increased time for completion, cues for hand placement, with verbal cues  Stand to [facility], with increased time for completion, cues for hand placement, with verbal cues    Ambulation:  Contact Guard Assistance, Minimal Assistance, X 1, with cues for safety, with verbal cues , with increased time for completion  Distance: ~6ft, ~20ft, 4ft  Surface: Level Tile  Device:Rolling Walker  Gait Deviations: Forward Flexed Posture, Slow Nahed, Decreased Step Length Bilaterally, Decreased Weight Shift Bilaterally, Decreased Trunk Rotation, Decreased Gait Speed, Decreased Heel Strike Bilaterally, Wide Base of Support, Mild Path Deviations, Unsteady Gait and decreased hip/knee flexion, cues to keep walker closer to body for safety, limited carry over    Balance:  Static Standing Balance: Contact Guard Assistance, Minimal Assistance, with cues for safety, with verbal cues , cues for posture    Exercise:  Patient was guided in 1 set(s) 15 reps of exercise to both lower extremities. Ankle pumps, Quad sets, Heelslides, Hip abduction/adduction, Straight leg raises, Seated marches, Seated hamstring curls and Long arc quads. Exercises were completed for increased independence with functional mobility. Functional Outcome Measures: Not completed       ASSESSMENT:  Assessment: Limited carry over with cues, requires cues for hand placement. Requires Min to Mod A for transfers  Activity Tolerance:  Patient tolerance of  treatment: fair. Equipment Recommendations: Other: monitor for needs  Discharge Recommendations:  Continue to assess pending progress    Plan: Times per week: 6x  Current Treatment Recommendations: Strengthening, Balance

## 2020-11-11 PROCEDURE — 6370000000 HC RX 637 (ALT 250 FOR IP): Performed by: FAMILY MEDICINE

## 2020-11-11 PROCEDURE — 97530 THERAPEUTIC ACTIVITIES: CPT

## 2020-11-11 PROCEDURE — 97116 GAIT TRAINING THERAPY: CPT

## 2020-11-11 PROCEDURE — 94761 N-INVAS EAR/PLS OXIMETRY MLT: CPT

## 2020-11-11 PROCEDURE — 97110 THERAPEUTIC EXERCISES: CPT

## 2020-11-11 PROCEDURE — 94660 CPAP INITIATION&MGMT: CPT

## 2020-11-11 PROCEDURE — 97535 SELF CARE MNGMENT TRAINING: CPT

## 2020-11-11 PROCEDURE — 6370000000 HC RX 637 (ALT 250 FOR IP): Performed by: INTERNAL MEDICINE

## 2020-11-11 PROCEDURE — 6360000002 HC RX W HCPCS: Performed by: INTERNAL MEDICINE

## 2020-11-11 PROCEDURE — 1290000000 HC SEMI PRIVATE OTHER R&B

## 2020-11-11 RX ADMIN — MICONAZOLE NITRATE: 20 POWDER TOPICAL at 21:32

## 2020-11-11 RX ADMIN — DOCUSATE SODIUM 100 MG: 100 CAPSULE, LIQUID FILLED ORAL at 09:22

## 2020-11-11 RX ADMIN — MICONAZOLE NITRATE: 20 POWDER TOPICAL at 09:22

## 2020-11-11 RX ADMIN — METOPROLOL TARTRATE 50 MG: 50 TABLET, FILM COATED ORAL at 09:22

## 2020-11-11 RX ADMIN — ASPIRIN 325 MG: 325 TABLET, FILM COATED ORAL at 09:22

## 2020-11-11 RX ADMIN — FINASTERIDE 5 MG: 5 TABLET, FILM COATED ORAL at 09:22

## 2020-11-11 RX ADMIN — METOPROLOL TARTRATE 50 MG: 50 TABLET, FILM COATED ORAL at 21:32

## 2020-11-11 RX ADMIN — Medication 1 CAPSULE: at 09:22

## 2020-11-11 RX ADMIN — ENOXAPARIN SODIUM 30 MG: 30 INJECTION SUBCUTANEOUS at 21:32

## 2020-11-11 RX ADMIN — POTASSIUM CHLORIDE 20 MEQ: 1500 TABLET, EXTENDED RELEASE ORAL at 09:22

## 2020-11-11 RX ADMIN — ATORVASTATIN CALCIUM 20 MG: 20 TABLET, FILM COATED ORAL at 21:32

## 2020-11-11 RX ADMIN — FAMOTIDINE 20 MG: 20 TABLET, FILM COATED ORAL at 09:22

## 2020-11-11 RX ADMIN — FUROSEMIDE 40 MG: 40 TABLET ORAL at 09:22

## 2020-11-11 RX ADMIN — ENOXAPARIN SODIUM 30 MG: 30 INJECTION SUBCUTANEOUS at 09:22

## 2020-11-11 ASSESSMENT — PAIN SCALES - GENERAL: PAINLEVEL_OUTOF10: 0

## 2020-11-11 NOTE — PROGRESS NOTES
[address] [address]  INPATIENT PHYSICAL THERAPY  DAILY NOTE  - [place] [facility] - 8E-59/059-A    Time In: 0848  Time Out: 9486  Timed Code Treatment Minutes: 47 Minutes  Minutes: 54          Date: 2020  Patient Name: Philomena Castro,  Gender:  male        MRN: [id number]  : 1934  (80 y.o.)     Referring Practitioner: Attending: Kunal Clemons MD; Ordering: Claudene Stabs, MD  Diagnosis: COVID-19  Additional Pertinent Hx: Per EMR, Gala Schmidt is a 80 y.o. male who presents to the Emergency Department for the evaluation of progressive weakness, fatigue, cough. Symptoms have been developing over the past 4 to 5 days. \"     Prior Level of Function:  Lives With: Spouse  Type of Home: House  Home Layout: Two level, Performs ADL's on one level, Able to Live on Main level with bedroom/bathroom  Home Access: Stairs to enter with rails  Entrance Stairs - Number of Steps: 1 CHRISTY with wilmer rails  Home Equipment: Rolling walker   Bathroom Shower/Tub: Tub/Shower unit, Shower chair with back  Bathroom Toilet: Handicap height  Bathroom Equipment: Hand-held shower  Bathroom Accessibility: Accessible    ADL Assistance: Independent(Pt reports Indep with dressing, toileting, and showers.)  Homemaking Assistance: Needs assistance(Spouse complete cleaning and laundry. Kids bring in meals frequently or they go out to eat.)  Homemaking Responsibilities: Yes  Ambulation Assistance: Independent  Transfer Assistance: Independent  Active : Yes    Restrictions/Precautions:  Restrictions/Precautions: General Precautions, Fall Risk  Position Activity Restriction  Other position/activity restrictions: COVID 19+, -Room air     SUBJECTIVE: Patient laying in bed upon arrival and agreeable to therapy.      PAIN: denies    OBJECTIVE:  Bed Mobility:  Rolling to Left: Minimal Assistance, with head of bed raised, with verbal cues , with increased time for completion   Supine to Sit: Minimal Assistance, Moderate Assistance, X 2, with head of bed raised, with verbal cues , with increased time for completion  Scooting: Stand By Assistance    Transfers:  Sit to Stand: Moderate Assistance, with increased time for completion, cues for hand placement, with verbal cues  Stand to [address] [address], Minimal Assistance, X 1, with increased time for completion, cues for hand placement, with verbal cues   **max cues for hand placement to reach back to seated surface before sitting    Ambulation:  Contact Guard Assistance, w/c follow  Distance: 15ft, 20ft  Surface: Level Tile  Device:Rolling Walker  Gait Deviations: Forward Flexed Posture, Slow Nahed, Decreased Step Length Bilaterally, Decreased Weight Shift Bilaterally, Decreased Trunk Rotation, Decreased Gait Speed, Decreased Heel Strike Bilaterally, Wide Base of Support, Mild Path Deviations, Unsteady Gait and decreased hip/knee flexion, cues to stay inside RW for safety    Balance:  Dynamic Standing Balance: Contact Guard Assistance, BUE support at parallel bars performing standing LE exercises, requires seated rest break between each exercise due to fatigue   Dynamic gait: CGA            -in parallel bars: forward/side-stepping walking, requires seated rest breaks between each bout due to fatigue    Exercise:  Patient was guided in 1 set(s) 10 reps of exercise to both lower extremities. Ankle pumps, Quad sets, Heelslides, Hip abduction/adduction, Straight leg raises, Seated marches, Long arc quads, Standing heel/toe raises, Standing marches, Standing hamstring curls and Standing hip flexion. Exercises were completed for increased independence with functional mobility. Functional Outcome Measures: Not completed       ASSESSMENT:  Assessment: Limited by fatigue. Activity Tolerance:  Patient tolerance of  treatment: fair. Equipment Recommendations: Other: monitor for needs  Discharge Recommendations:  Continue to assess pending progress    Plan: Times per week: 6x  Current Treatment Recommendations: Strengthening, Balance Training, Endurance Training, Functional Mobility Training, Transfer Training, Gait Training, Stair training, Patient/Caregiver Education & Training, Safety Education & Training, Home Exercise Program    Patient Education  Patient Education: Plan of Care, [employer] Mobility, Equipment Education, Transfers, Reviewed Prior Education, Gait, Up in Chair for Loan Anaya, Verbal Exercise Instruction    Goals:  Patient goals : go home  Short term goals  Time Frame for Short term goals: 1 week  Short term goal 1: Pt to be Mod I for supine <> sit to get in/out of bed  Short term goal 2: Pt to be Mod I for sit <> stand to get up to ambulate  Short term goal 3: Pt to ambulate > 150 ft with RW with Supervision for household distances  Short term goal 4: Pt to negotiate 1 step with 1-2 rails or RW with SBA for home access  Long term goals  Time Frame for Long term goals : not set due to short ELOS    Following session, patient left in safe position with all fall risk precautions in place.

## 2020-11-11 NOTE — PROGRESS NOTES
Spoke with daughter April,to update on patient condition and discuss discharge planning needs. Information shared included: Finally had a BM, doesn't seem to be voiding as frequently today. Quiet and doesn't call out much. Only uses incentive spirometer when physically handed to him. Still running a temp at times. Informed her that this nurse would check temp again at supper time.      [facility] medical condition: [facility]    Therapy progress: progressing

## 2020-11-11 NOTE — PLAN OF CARE
[address] [address]  Physical Medicine Case Management Assessment    [] Inpatient Rehabilitation Unit  [x] Transitional Care Unit    Patient Name: Jarad Pedraza        MRN: [id number]    : 1934  (80 y.o.)  Gender: male   Date of Admission: 2020 11:09 PM    Family/Social/Home Environment: Social/Functional History: Patient is an 80-year-old  male who was admitted to [address] [place] [address] following a stay on acute for COVID. Patient and his spouse live together in a private residence, they have supportive family who assist regularly. Patient plans to return home at time of discharge, he was an active , completing finances, medications, and all personal care independently prior to hospital stay. He and his wife share household responsibilities. SW anticipates patient may need ongoing skilled therapies at time of discharge. Lives With: Spouse  Type of Home: House  Home Layout: Two level, Performs ADL's on one level, Able to Live on Main level with bedroom/bathroom  Home Access: Stairs to enter with rails  Entrance Stairs - Number of Steps: 1 CHRISTY with wilmer rails  Bathroom Shower/Tub: Tub/Shower unit, Shower chair with back  [place] Toilet: Handicap height  Bathroom Equipment: Hand-held shower  Bathroom Accessibility: Accessible  Home Equipment: Rolling walker  ADL Assistance: Independent(Pt reports Indep with dressing, toileting, and showers.)  Homemaking Assistance: Needs assistance(Spouse complete cleaning and laundry. Kids bring in meals frequently or they go out to eat.)  Homemaking Responsibilities: Yes  Ambulation Assistance: Independent  Transfer Assistance: Independent  Active : Yes  Mode of Transportation: Car  Occupation: Retired    Contact/Guardian Information:   [email or web address] ([address] [place] [address]) [phone]    [employer] Utilized: None prior to hospitalization       Anticipated Needs/Discharge Plans: Anticipate patient may need ongoing skilled therapies at time of discharge.        Discharge Planning:SW met with patient gave an overview of TCU,Team Conference/Team Conference schedule and this SW's role in discharge planning. SW will monitor progress and maintain contact with patient and patient's family regarding length of stay and recommendations. SW to follow and maintain involvement in discharge planning. Care plan reviewed with patient. Patient verbalized understanding of the plan of care and contributed to goal setting.     Living Arrangements: Spouse/Significant Other  Support Systems: Spouse/Significant Other, Children, Family Members, Friends/Neighbors  Potential Assistance Needed: Home Care  Meds-to-Beds: Does the patient want to have any new prescriptions delivered to bedside prior to discharge?: No  Type of Home Care Services: [facility]  Follow Up Appointment: Best Day/Time : Wednesday AM      Lizeth Haddad 11/11/2020 11:50 AM

## 2020-11-11 NOTE — PROGRESS NOTES
[address] [address]  [address]  Occupational Therapy  Daily Note  Time:   Time In: 1330  Time Out: 1408  Timed Code Treatment Minutes: 45 Minutes  Minutes: 38          Date: 2020  Patient Name: Heriberto Hernandez,   Gender: male      Room: [address] [place], [place] [address]-A  MRN: [id number]  : 1934  (80 y.o.)  Referring Practitioner: Ordering: Gio Curry MD Attending: Dr. Deepak Osborn  Diagnosis: Covid 19  Additional Pertinent Hx: Per EMR, Esme Zhang is a 80 y.o. male who presents to the Emergency Department for the evaluation of progressive weakness, fatigue, cough. Symptoms have been developing over the past 4 to 5 days. \" Pt admitted to TCU on 20. Restrictions/Precautions:  Restrictions/Precautions: General Precautions, Fall Risk  Position Activity Restriction  Other position/activity restrictions: COVID 19+, -Room air     SUBJECTIVE: Up in chair upon arrival, agreeable to OT session, confusion noted      Patient on Room Air  upon arrival to room. Patient O2 sats at 93 %. Upon activity patient maintaining O2 at 92 %. Pt requiring minimal rest break(s) to recover. PAIN: 0/10:     COGNITION: Slow Processing, Decreased Insight, Impaired Memory, Decreased Problem Solving and Decreased Safety Awareness    ADL:   Lower Extremity Dressing: Dependent. for changing depends  Toileting: Maximum Assistance. A for placement of urinal, A for wiping buttocks and for clothing management  Toilet Transfer: Minimal Assistance. RTS with arm rest.    Incontinent of urine, dependent for clean up      BALANCE:  Standing Balance: [employer]. approx 2 minutes with 1-2 UE support    BED MOBILITY:  Not Tested    TRANSFERS:  Sit to Stand:  Minimal Assistance. bedside chair x 3 events  Stand to Sit: Minimal Assistance. FUNCTIONAL MOBILITY:  Assistive Device: Rolling Walker  Assist Level:  Minimal Assistance. Distance:  To and from bathroom  Unsteady     ADDITIONAL ACTIVITIES:  Patient identified a personal goal to increase UB strength and improve overall endurance so they can complete their toilet & shower transfers; skilled edu on UE strengthening and patient completed BUE strengthening exercises x10 reps x1 set this date with a resistive band in all joints and all planes. Patient tolerated well. ASSESSMENT:     Activity Tolerance:  Patient tolerance of  treatment: good. Discharge Recommendations: Home with Home health OT, Continue to assess pending progress   Equipment Recommendations: Equipment Needed: No  Other: Monitor needs  Plan: Times per week: 6x  Current Treatment Recommendations: Strengthening, Endurance Training, Balance Training, Functional Mobility Training, Patient/Caregiver Education & Training, Self-Care / ADL, Home Management Training, Safety Education & Training    Patient Education  Patient Education: ADL's and Home Exercise Program    Goals  Short term goals  Time Frame for Short term goals: 2 weeks  Short term goal 1: Pt will complete BUE light resistive exercises with min vcs for safety to increase indep and safety with all self cares and transfers. Short term goal 2: Pt will complete standing tolerance x3 minutes with 1-2 UE and SBA release to increase indep and safety with all grooming. Short term goal 3: Pt will complete functional mobility to/from BR and HH distances with SBA and min vcs for safety to increase indep and safety with all toileting. Short term goal 4: Pt will complete LB dressing with LHAE and min A to increase indep and safety with all self cares. Long term goals  Time Frame for Long term goals : 4 weeks  Long term goal 1: Pt will complete BADL routine Mod indep with 0 vcs for safety and O2 stats remaining above 90% to increase indep and endurance in home environment. Long term goal 2: Pt will complete simple IADL with S and 0 vcs for safety to increase indep with getting a snack.     Following session, patient left in safe position with all fall risk precautions in place.

## 2020-11-11 NOTE — PLAN OF CARE
tablet 40 mg  40 mg Oral Daily Alejandro Person MD   40 mg at 11/11/20 9011    lactobacillus (CULTURELLE) capsule 1 capsule  1 capsule Oral Daily Alejandro Person MD   1 capsule at 11/11/20 7975    metoprolol tartrate (LOPRESSOR) tablet 50 mg  50 mg Oral BID Alejandro Person MD   50 mg at 11/11/20 8722    potassium chloride (KLOR-CON M) extended release tablet 20 mEq  20 mEq Oral Daily Alejandro Person MD   20 mEq at 11/11/20 1333    ppd (tuberculin skin test) read   Does not apply Weekly Alejandro Person MD        tuberculin injection 5 Units  5 Units Intradermal Weekly Alejandro Person MD   5 Units at 11/07/20 1558    miconazole (MICOTIN) 2 % powder   Topical BID Jamshid Garsia MD        docusate sodium (COLACE) capsule 100 mg  100 mg Oral BID PRN Jamshid Garsia MD   100 mg at 11/11/20 5062    senna (SENOKOT) tablet 8.6 mg  1 tablet Oral Nightly PRN Jamshid Garsia MD        famotidine (PEPCID) tablet 20 mg  20 mg Oral Daily Jamshid Garsia MD   20 mg at 11/11/20 8952       Plan of Care Problems and Goals      Problem: Pain    Goal: Pain Level will decrease   Problem: Falls- Risk of    Goal: Absence of falls    Goal: Absence of physical injury   Problem: Risk of Impaired Skin Integrity    Goal: Absence of new skin breakdown   Problem: Risk of Bleeding    Goal: Will show no signs and symptoms of excessive bleeding     Problem: Urinary Elimination- Impaired    Goal: Decrease in number of episodes of urinary incontinence    Goal: Skin integrity will improve   Problem: Nutrition    Goal: Optimal nutrition therapy   Problem: Fluid Volume- Risk of, Imbalanced    Goal: Will remain free of signs and symptoms of dehydration   Problem: Impaired Mobility    Goal: Mobility will improve   Problem: Covid-19 Infection    Goal: Prevent transmission of infection

## 2020-11-12 PROCEDURE — 97116 GAIT TRAINING THERAPY: CPT

## 2020-11-12 PROCEDURE — 6370000000 HC RX 637 (ALT 250 FOR IP): Performed by: INTERNAL MEDICINE

## 2020-11-12 PROCEDURE — 97535 SELF CARE MNGMENT TRAINING: CPT

## 2020-11-12 PROCEDURE — 6360000002 HC RX W HCPCS: Performed by: INTERNAL MEDICINE

## 2020-11-12 PROCEDURE — 97110 THERAPEUTIC EXERCISES: CPT

## 2020-11-12 PROCEDURE — 97530 THERAPEUTIC ACTIVITIES: CPT

## 2020-11-12 PROCEDURE — 6370000000 HC RX 637 (ALT 250 FOR IP): Performed by: FAMILY MEDICINE

## 2020-11-12 PROCEDURE — 97129 THER IVNTJ 1ST 15 MIN: CPT

## 2020-11-12 PROCEDURE — 97130 THER IVNTJ EA ADDL 15 MIN: CPT

## 2020-11-12 PROCEDURE — 1290000000 HC SEMI PRIVATE OTHER R&B

## 2020-11-12 RX ADMIN — ENOXAPARIN SODIUM 30 MG: 30 INJECTION SUBCUTANEOUS at 20:00

## 2020-11-12 RX ADMIN — MICONAZOLE NITRATE: 20 POWDER TOPICAL at 09:06

## 2020-11-12 RX ADMIN — ACETAMINOPHEN 650 MG: 325 TABLET ORAL at 20:00

## 2020-11-12 RX ADMIN — FAMOTIDINE 20 MG: 20 TABLET, FILM COATED ORAL at 09:06

## 2020-11-12 RX ADMIN — Medication 1 CAPSULE: at 09:06

## 2020-11-12 RX ADMIN — FINASTERIDE 5 MG: 5 TABLET, FILM COATED ORAL at 09:06

## 2020-11-12 RX ADMIN — FUROSEMIDE 40 MG: 40 TABLET ORAL at 09:06

## 2020-11-12 RX ADMIN — POTASSIUM CHLORIDE 20 MEQ: 1500 TABLET, EXTENDED RELEASE ORAL at 09:06

## 2020-11-12 RX ADMIN — MICONAZOLE NITRATE: 20 POWDER TOPICAL at 20:01

## 2020-11-12 RX ADMIN — DOCUSATE SODIUM 100 MG: 100 CAPSULE, LIQUID FILLED ORAL at 20:00

## 2020-11-12 RX ADMIN — ASPIRIN 325 MG: 325 TABLET, FILM COATED ORAL at 09:06

## 2020-11-12 RX ADMIN — METOPROLOL TARTRATE 50 MG: 50 TABLET, FILM COATED ORAL at 20:00

## 2020-11-12 RX ADMIN — METOPROLOL TARTRATE 50 MG: 50 TABLET, FILM COATED ORAL at 09:06

## 2020-11-12 RX ADMIN — ATORVASTATIN CALCIUM 20 MG: 20 TABLET, FILM COATED ORAL at 20:00

## 2020-11-12 RX ADMIN — ENOXAPARIN SODIUM 30 MG: 30 INJECTION SUBCUTANEOUS at 09:13

## 2020-11-12 ASSESSMENT — PAIN DESCRIPTION - PAIN TYPE: TYPE: CHRONIC PAIN

## 2020-11-12 ASSESSMENT — PAIN DESCRIPTION - LOCATION: LOCATION: NECK;SHOULDER

## 2020-11-12 ASSESSMENT — PAIN SCALES - GENERAL
PAINLEVEL_OUTOF10: 0
PAINLEVEL_OUTOF10: 3

## 2020-11-12 NOTE — FLOWSHEET NOTE
reached out to Mr Valeria Morris by phone. Pt answered the phone, but appeared to be very confused. Pt was not able to hold conversation.  blessed pt and ended the phone call. Spiritual care to continue to offer support and encouragement. .     11/12/20 1223   Encounter Summary   Services provided to: Patient   Referral/Consult From: Rounding   Support System Unknown   Continue Visiting Yes  (11/12)   Complexity of Encounter Low   Length of Encounter 15 minutes   Routine   Type Initial   Assessment Approachable   Intervention Active listening   Outcome Engaged in conversation

## 2020-11-12 NOTE — PROGRESS NOTES
[address] [address]  INPATIENT SPEECH THERAPY  - [facility]  DAILY NOTE    TIME   SLP Individual Minutes  Time In: 0940  Time Out: 1010  Minutes: 30  Timed Code Treatment Minutes: 30 Minutes       Date: 2020  Patient Name: Feng Rome      CSN: [id number]   : 1934  (80 y.o.)  Gender: male   Referring Physician: Jewels Elizalde MD   Diagnosis: COVID-19   Secondary Diagnosis: Cognitive deficits   Precautions: Fall risk, contact isolation/droplet precautions   Current Diet: Regular diet and thin liquids   Swallowing Strategies: Standard Universal Swallow Precautions  Date of Last MBS: Not Applicable    Pain:  No pain reported. Subjective:  Pt seen upright in bed. Need to utilize restroom midway into session. Pt alert and fully cooperative. Significantly slow processing with extra time required. Limited tasks completed d/t difficulty r/t organization, mental flexibility and working recall. Short-Term Goals:  SHORT TERM GOAL #1:  Goal 1: Pt will complete functional immediate/delayed recall (focus on compensatory strategies) tasks with 70% accuracy given mod cues to improve retention of pertinent information. INTERVENTIONS: Orientation:  indep recall of month, year, location (x2) and time  FO2 to recall DAVON (poor visibility of calendar)  Max cues to recall date (max cues to locate calendar)     Recall of breakfast items: Indep recall of 4/5 (confirmed by nutritional ambassador). *anomia with appropriate description of item; however, poor recall of name (I.e. [place] toast vs pancakes) Will continue to monitor verbal expression.      Recalling functional information pertaining to ST: (4 units)  Immediate recall: 3/4 indep, 1/4 unable to elicit  Repetition: indep use of aid with supervision assist required to locate/recall all details   15 minute delay: indep reference of aid, 3/4 indep, 1/4 supervision     SHORT TERM GOAL #2:  Goal 2: Pt will complete problem solving, sequential analysis, and verbal/visual reasoning (time, money/math/finances, medications) tasks with 70% accuracy given mod cues to improve independence within ADL/IADL completion. INTERVENTIONS: Calculating bill/coin amounts: 1/6 supervision, 1/6 min cues, 2/6 min-mod cues, 1/6  Mod-max cues, 1/6 max cues   *slow processing, decreased recognition of coins, impaired working memory, decreased mental manipulation/flexibility     SHORT TERM GOAL #3:  Goal 3: Pt will complete thought organization and divergent thinking tasks with 70% accuracy given mod cues/naming 5 items in 1 minute time frame across 2/3 trials (no cues) to improve processing speed and mental flexibilty. INTERVENTIONS: DNT d/t focus on other goals     SHORT TERM GOAL #4:  Goal 4: Pt will complete moderately complex attention tasks with no more than 3 errors until task completion to permit potential return to driving and IADL management. INTERVENTIONS: DNT d/t focus on other goals     Long-Term Goals:  Timeframe for Long-term Goals: 3 weeks    LONG TERM GOAL #1:  Goal 1: Pt will improve cognitive functioning to a supervision level or higher to optimize cognitive performacne for safe return to home setting and ADL/IADL completion with support of family. ONGOING     EDUCATION:  Learner: Patient  Education:  Reviewed ST goals and Plan of Care  Evaluation of Education: Verbalizes understanding, Demonstrates with assistance, Needs further instruction and Family not present    ASSESSMENT/PLAN:  Activity Tolerance:  Patient tolerance of  treatment: good. Appropriate engagement and participation within session      Assessment/Plan: Patient progressing toward established goals. Continues to require skilled care of licensed speech pathologist to progress toward achievement of established goals and plan of care. .     Plan for Next Session: memory, mental manipulation/flexibility, organization      Seferino Pinto M.S. CCC-SLP [id number] 11/12/2020

## 2020-11-12 NOTE — PROGRESS NOTES
[address] [address]  [address]  Occupational Therapy  Daily Note  Time:   Time In: 7731  Time Out: 0737  Timed Code Treatment Minutes: 52 Minutes  Minutes: 47          Date: 2020  Patient Name: Lizette Muse,   Gender: male      Room: [facility]/059-A  MRN: [id number]  : 1934  (80 y.o.)  Referring Practitioner: Ordering: Kentrell Frye MD Attending: Dr. Lonell Simmonds  Diagnosis: Covid 19  Additional Pertinent Hx: Per EMR, Katelyn Sidhu is a 80 y.o. male who presents to the Emergency Department for the evaluation of progressive weakness, fatigue, cough. Symptoms have been developing over the past 4 to 5 days. \" Pt admitted to TCU on 20. Restrictions/Precautions:  Restrictions/Precautions: General Precautions, Fall Risk  Position Activity Restriction  Other position/activity restrictions: COVID 19+, -Room air    SUBJECTIVE: Up in chair upon arrival, agreeable to OT session, Patient was labored in breathing at times    Patient on Room Air  upon arrival to room. Patient O2 sats at 92 %. Upon activity patient dropping O2 at 85 %. Pt requiring Moderate rest break(s) to recover. Nurse Luciana Craven notified      PAIN: 0/10:     COGNITION: Slow Processing    ADL:   Grooming: with set-up. combed hair sitting in bedside chair  Toileting: Minimal Assistance. A for placement of urinal.    BALANCE:  Standing Balance: Contact Guard Assistance. approx 1 1/2 minutes with 2 UE support    BED MOBILITY:  Sit to Supine: Stand By Assistance HOB flat and no bedrail    TRANSFERS:  Sit to Stand:  Contact Guard Assistance. bedside chair x 3 events  Stand to Sit: Contact Guard Assistance. FUNCTIONAL MOBILITY:  Assistive Device: Rolling Walker  Assist Level:  Contact Guard Assistance.    Distance: bedside chair to EOB     ADDITIONAL ACTIVITIES:  Patient identified a personal goal to increase UB strength and improve overall endurance so they can complete their toilet & shower transfers; skilled edu on UE strengthening and patient completed BUE strengthening exercises x10 reps x1 set this date with a resistive band in all joints and all planes. Patient tolerated well. ASSESSMENT:     Activity Tolerance:  Patient tolerance of  treatment: good. Discharge Recommendations: Home with Home health OT, Continue to assess pending progress   Equipment Recommendations: Equipment Needed: No  Other: Monitor needs  Plan: Times per week: 6x  Current Treatment Recommendations: Strengthening, Endurance Training, Balance Training, Functional Mobility Training, Patient/Caregiver Education & Training, Self-Care / ADL, Home Management Training, Safety Education & Training    Patient Education  Patient Education: ADL's and Home Exercise Program    Goals  Short term goals  Time Frame for Short term goals: 2 weeks  Short term goal 1: Pt will complete BUE light resistive exercises with min vcs for safety to increase indep and safety with all self cares and transfers. Short term goal 2: Pt will complete standing tolerance x3 minutes with 1-2 UE and SBA release to increase indep and safety with all grooming. Short term goal 3: Pt will complete functional mobility to/from BR and HH distances with SBA and min vcs for safety to increase indep and safety with all toileting. Short term goal 4: Pt will complete LB dressing with LHAE and min A to increase indep and safety with all self cares. Long term goals  Time Frame for Long term goals : 4 weeks  Long term goal 1: Pt will complete BADL routine Mod indep with 0 vcs for safety and O2 stats remaining above 90% to increase indep and endurance in home environment. Long term goal 2: Pt will complete simple IADL with S and 0 vcs for safety to increase indep with getting a snack. Following session, patient left in safe position with all fall risk precautions in place.

## 2020-11-12 NOTE — PROGRESS NOTES
Patient Name: Rebecca Lyle        MRN: [id number]    : 1934  (80 y.o.)  Gender: male   Principal Problem: Muscular deconditioning    Section D - Mood     Should Resident Mood Interview be Conducted? - Attempt to conduct interview with all residents   0. No (resident is rarely/never understood) à Skip to and complete -, Staff Assessment of Mood (PHQ 9-OV)  1. Yes à Continue to , Resident Mood Interview (PHQ-9) Enter Code    1   . Resident Mood Interview (PHQ-9)   Say to resident: Issa Grant the last 2 weeks, have you been bothered by any of the following problems?    If symptom is present, enter 1(yes) in column 1, Symptom Presence. Then move to column 2, Symptom Frequency, and indicate symptom frequency. 1. Symptom Presence                   2. Symptom                                                                  Frequency  0. No (enter 0 in column 2)          0. Never or 1 day          1. Yes (enter 0-3 in column 2)      1. 2-6 days           9. No Response                               2.  7-11 days                                                 3.  12-14 days   1. Symptom Presence 2. Symptom  Frequency        Enter Scores in boxes below   A. Little interest or pleasure in doing things 0 0   B. Feeling down, depressed, or hopeless 1 1   C. Trouble falling or staying asleep, or sleeping too much 1 2   D. Feeling tired or having little energy 1 2   E. Poor appetite or overeating 1 2   F. Feeling bad about yourself - or that you are a failure, or have let your family down 1 1   G. Trouble concentrating on things, such as reading the newspaper or watching television 1 1   H. Moving or speaking so slowly that other people have noticed. Or the opposite-being so fidgety or restless that s/he has been moving around a lot more than usual   0   0   I. Thoughts that you would be better off dead, or of hurting yourself in some way.  0 0              Patient Name: Rebecca Lyle MRN: [id number]    : 1934  (80 y.o.)  Gender: male   Principal Problem: Muscular deconditioning    Section F - Preferences for Customary Routine Activities   . Should Interview for Daily and Activity Preferences be Conducted? - Attempt to interview all residents able to communicate. If resident is unable to complete, attempt to complete interview with family member or significant other. Enter Code    1 0. No (resident is rarely/never understood and family/significant other not available) à Skip to and complete , Staff Assessment of Daily and Activity Preferences  1. Yes à Continue to Marianna Pearl for Daily Preferences   . Interview for Daily Preferences  Show resident the response options and say: While you are in this facility           [place] - Very Important  2 - Somewhat Important  3 - Not very important  4 - Not important at all  5 - Important, but cant do or no choice  6 - No response or non-responsive Enter Codes in Boxes    3 A. How important is it to you to choose what clothes to wear?    2 B. How important is it to you to take care of your personal belongings or things?    3 C. How important is it to you to choose between a tub bath, shower, bed bath or sponge bath?    2 D. How important is it to you to have snacks available between meals?    3 E. How important is it to you to choose your own bedtime?    1 F. How important is it to you to have your family or a close friend involved in discussions about your care?    2 G. How important is it to you to be able to use the phone in private?    1 H. How important is it to you to have a place to lock your things to keep them safe? .  Interview for Activity Preferences   Show resident the response options and say: While you are in this facility           [place] - Very Important  2 - Somewhat Important  3 - Not very important  4 - Not important at all  5 - Important, but cant do or no choice  6 - No response or non-responsive Enter Codes in Boxes    3 A. How important is it to you to have books, newspapers, and magazines to read?    3 B. How important is it to you to listen to music you like?    3 C. How important is it to you to be around animals such as pets?     2 D. How important is it to you to keep up with the news?    4 E. How important is it to you to do things with groups of people?     1 F. How important is it to you to do your favorite activities?     1 G. How important is it to you to go outside to get fresh air when the weather is good?     1 H. How important is it to you to participate in [employer] services or practices? . Daily and Activity Preferences Primary Respondent   Enter Code    1 Indicate Primary respondent for Daily and Activity Preferences ( and )  1 - Resident  2 - Family or Significant other (close friend or other representative)  5 - Interview could not be completed by resident or family/significant other (no response to 3 or more items)         Patient Name: Lizette Muse        MRN: [id number]    : 1934  (80 y.o.)  Gender: male   Principal Problem: Muscular deconditioning    Section J    Health Conditions    Should Pain Assessment Interview be Conducted? Attempt to conduct interview with all residents. If resident is comatose, skip to , Shortness of Breath (dyspnea)   Enter Code  1 0 - No à (resident is rarely/never understood) à Skip to [address] of Breath  1 - Yes à Continue to , Pain Presence   Pain Assessment Interview   Pain Presence   Enter Code  1 Ask resident: Rose Argue you had pain or hurting at any time in the last 5 days?   0 - No à Skip to , Shortness of Breath  1 - Yes  à Continue to , Pain Frequency  9 - Unable to answer à Skip to , Shortness of Breath    Pain Frequency   Enter Code          3 Ask resident: Nighat Woodward much of the time have you experienced pain or hurting over the last 5 days?   1 - Almost

## 2020-11-12 NOTE — PROGRESS NOTES
[address] [address]  INPATIENT PHYSICAL THERAPY  DAILY NOTE  - [facility] - [id number]-59/[id number]    Time In: 1017  Time Out: 1112  Timed Code Treatment Minutes: 54 Minutes  Minutes: 55          Date: 2020  Patient Name: Ervin Guan,  Gender:  male        MRN: [id number]  : 1934  (80 y.o.)     Referring Practitioner: Attending: Delfino Gregory MD; Ordering: Samir Villar MD  Diagnosis: COVID-19  Additional Pertinent Hx: Per EMR, Fina Albert is a 80 y.o. male who presents to the Emergency Department for the evaluation of progressive weakness, fatigue, cough. Symptoms have been developing over the past 4 to 5 days. \"     Prior Level of Function:  Lives With: Spouse  Type of Home: House  Home Layout: Two level, Performs ADL's on one level, Able to Live on Main level with bedroom/bathroom  Home Access: Stairs to enter with rails  Entrance Stairs - Number of Steps: 1 CHRISTY with wilmer rails  Home Equipment: Rolling walker   Bathroom Shower/Tub: Tub/Shower unit, Shower chair with back  Bathroom Toilet: Handicap height  Bathroom Equipment: Hand-held shower  Bathroom Accessibility: Accessible    ADL Assistance: Independent(Pt reports Indep with dressing, toileting, and showers.)  Homemaking Assistance: Needs assistance(Spouse complete cleaning and laundry. Kids bring in meals frequently or they go out to eat.)  Homemaking Responsibilities: Yes  Ambulation Assistance: Independent  Transfer Assistance: Independent  Active : Yes    Restrictions/Precautions:  Restrictions/Precautions: General Precautions, Fall Risk  Position Activity Restriction  Other position/activity restrictions: COVID 19+, -Room air     SUBJECTIVE: Patient in recliner upon arrival and agreeable to therapy.     PAIN: denies    OBJECTIVE:  Bed Mobility:  Not Tested    Transfers:  Sit to Stand: [employer], with increased time for completion, cues for hand placement, with verbal cues  Stand to Sit:Contact Guard Assistance, with increased time for completion, cues for hand placement, with verbal cues   **focused on transfers from various seated heights, elevated and lowering mat table ~x3 trials at each height. Ambulation:  Contact Guard Assistance, with cues for safety, with verbal cues , with increased time for completion, w/c follow  Distance: ~20ft, 10ft, ~30ft with encouragement  Surface: Level Tile  Device:Rolling Walker  Gait Deviations: Forward Flexed Posture, Slow Nahed, Decreased Step Length Bilaterally, Decreased Weight Shift Bilaterally, Decreased Trunk Rotation, Decreased Gait Speed, Decreased Heel Strike Bilaterally, Wide Base of Support, Moderate Path Deviations, Unsteady Gait and slight circumducted gait, cues to perform \"march-like\" gait able to complete for small distances then reverts back to decreased hip/knee flexion    Exercise:  Patient was guided in 1 set(s) 10 reps of exercise to both lower extremities. Quad sets, Heelslides, Hip abduction/adduction, Seated marches, Long arc quads and Standing heel/toe raises. Exercises were completed for increased independence with functional mobility. Functional Outcome Measures: Not completed       ASSESSMENT:  Assessment: Patient progressing toward established goals. Activity Tolerance:  Patient tolerance of  treatment: fair. Equipment Recommendations: Other: monitor for needs  Discharge Recommendations:  Continue to assess pending progress    Plan: Times per week: 6x  Current Treatment Recommendations: Strengthening, Balance Training, Endurance Training, Functional Mobility Training, Transfer Training, Gait Training, Stair training, Patient/Caregiver Education & Training, Safety Education & Training, Home Exercise Program    Patient Education  Patient Education: Plan of Care, Transfers, Gait, Up in Chair for Loan Anaya, Verbal Exercise Instruction    Goals:  Patient goals : go home  Short term goals  Time Frame for Short term goals: 1 week  Short term goal 1: Pt to be Mod I for supine <> sit to get in/out of bed  Short term goal 2: Pt to be Mod I for sit <> stand to get up to ambulate  Short term goal 3: Pt to ambulate > 150 ft with RW with Supervision for household distances  Short term goal 4: Pt to negotiate 1 step with 1-2 rails or RW with SBA for home access  Long term goals  Time Frame for Long term goals : not set due to short ELOS    Following session, patient left in safe position with all fall risk precautions in place.

## 2020-11-13 PROCEDURE — 6370000000 HC RX 637 (ALT 250 FOR IP): Performed by: INTERNAL MEDICINE

## 2020-11-13 PROCEDURE — 97129 THER IVNTJ 1ST 15 MIN: CPT

## 2020-11-13 PROCEDURE — 97110 THERAPEUTIC EXERCISES: CPT

## 2020-11-13 PROCEDURE — 97535 SELF CARE MNGMENT TRAINING: CPT

## 2020-11-13 PROCEDURE — 1290000000 HC SEMI PRIVATE OTHER R&B

## 2020-11-13 PROCEDURE — 97530 THERAPEUTIC ACTIVITIES: CPT

## 2020-11-13 PROCEDURE — 2700000000 HC OXYGEN THERAPY PER DAY

## 2020-11-13 PROCEDURE — 6360000002 HC RX W HCPCS: Performed by: INTERNAL MEDICINE

## 2020-11-13 PROCEDURE — 0220000000 HC SKILLED NURSING FACILITY

## 2020-11-13 PROCEDURE — 94660 CPAP INITIATION&MGMT: CPT

## 2020-11-13 PROCEDURE — 6370000000 HC RX 637 (ALT 250 FOR IP): Performed by: FAMILY MEDICINE

## 2020-11-13 PROCEDURE — 94760 N-INVAS EAR/PLS OXIMETRY 1: CPT

## 2020-11-13 RX ADMIN — METOPROLOL TARTRATE 50 MG: 50 TABLET, FILM COATED ORAL at 08:55

## 2020-11-13 RX ADMIN — ACETAMINOPHEN 650 MG: 325 TABLET ORAL at 20:24

## 2020-11-13 RX ADMIN — ATORVASTATIN CALCIUM 20 MG: 20 TABLET, FILM COATED ORAL at 20:24

## 2020-11-13 RX ADMIN — POTASSIUM CHLORIDE 20 MEQ: 1500 TABLET, EXTENDED RELEASE ORAL at 08:55

## 2020-11-13 RX ADMIN — FINASTERIDE 5 MG: 5 TABLET, FILM COATED ORAL at 08:55

## 2020-11-13 RX ADMIN — FUROSEMIDE 40 MG: 40 TABLET ORAL at 08:55

## 2020-11-13 RX ADMIN — ENOXAPARIN SODIUM 30 MG: 30 INJECTION SUBCUTANEOUS at 08:55

## 2020-11-13 RX ADMIN — ASPIRIN 325 MG: 325 TABLET, FILM COATED ORAL at 08:55

## 2020-11-13 RX ADMIN — MICONAZOLE NITRATE: 20 POWDER TOPICAL at 08:55

## 2020-11-13 RX ADMIN — FAMOTIDINE 20 MG: 20 TABLET, FILM COATED ORAL at 08:55

## 2020-11-13 RX ADMIN — METOPROLOL TARTRATE 50 MG: 50 TABLET, FILM COATED ORAL at 20:24

## 2020-11-13 RX ADMIN — ENOXAPARIN SODIUM 30 MG: 30 INJECTION SUBCUTANEOUS at 20:24

## 2020-11-13 RX ADMIN — Medication 1 CAPSULE: at 08:55

## 2020-11-13 RX ADMIN — DOCUSATE SODIUM 100 MG: 100 CAPSULE, LIQUID FILLED ORAL at 20:24

## 2020-11-13 RX ADMIN — MICONAZOLE NITRATE: 20 POWDER TOPICAL at 20:31

## 2020-11-13 ASSESSMENT — PAIN SCALES - GENERAL
PAINLEVEL_OUTOF10: 0
PAINLEVEL_OUTOF10: 3

## 2020-11-13 ASSESSMENT — PAIN DESCRIPTION - LOCATION: LOCATION: NECK;SHOULDER

## 2020-11-13 ASSESSMENT — PAIN DESCRIPTION - PAIN TYPE: TYPE: CHRONIC PAIN

## 2020-11-13 NOTE — PLAN OF CARE
Problem: Gas Exchange - Impaired  Goal: Absence of hypoxia  Outcome: Ongoing  Oxygen applied this morning due to saturation of 88%. Up to 91% with 2L O2. Problem: Body Temperature -  Risk of, Imbalanced  Goal: Ability to maintain a body temperature within defined limits  11/13/2020 1022 by Octavio Mcnally RN  Outcome: Ongoing  99.2 oral temp this morning    Problem: Isolation Precautions - Risk of Spread of Infection  Goal: Prevent transmission of infection  Outcome: Ongoing  Patient has been reviewed by Infection Control. Pt has been determined by the CDC symptom guidelines to remove from isolation from COVID-19. Problem: Nutrition Deficits  Goal: Optimize nutrtional status  Outcome: Ongoing  Tolerating current diet and po fluids well. Problem: Falls - Risk of:  Goal: Will remain free from falls  Description: Will remain free from falls  11/13/2020 1022 by Octavio Mcnally RN  Outcome: Ongoing  Falling star prevention in place. Bed and chair alarms in use. Call light in reach. Purposeful hourly rounding. Problem: Skin Integrity:  Goal: Absence of new skin breakdown  Description: Absence of new skin breakdown  Outcome: Ongoing  No new skin breakdown noted since admission. Problem: Pain:  Goal: Pain level will decrease  Description: Pain level will decrease  Outcome: Ongoing  Pain decreases with rest, repositioning, ice application, and prn pain medications.

## 2020-11-13 NOTE — FLOWSHEET NOTE
11/13/20 1333   Provider Notification   Reason for Communication Evaluate  (need for cxr due to increased oxygen needs)   Provider Name Dr. Amanda Stein   Provider Notification Physician   Method of Communication Secure Message   Response Waiting for response   Notification Time [phone]  (Read at [phone])

## 2020-11-13 NOTE — PLAN OF CARE
Patient has been reviewed by Infection Control. Pt has been determined by the CDC symptom guidelines to remove from isolation from COVID-19.

## 2020-11-13 NOTE — PROGRESS NOTES
hospital: 2/3 independent, 1/3 min cues  -Telling time via analog clocks: 10/12 independent, 1/12 max cues, 8/11 not able to elicit    SHORT TERM GOAL #3:  Goal 3: Pt will complete thought organization and divergent thinking tasks with 70% accuracy given mod cues/naming 5 items in 1 minute time frame across 2/3 trials (no cues) to improve processing speed and mental flexibilty. INTERVENTIONS: DNT d/t focus on other goals     SHORT TERM GOAL #4:  Goal 4: Pt will complete moderately complex attention tasks with no more than 3 errors until task completion to permit potential return to driving and IADL management. INTERVENTIONS: DNT d/t focus on other goals     Long-Term Goals:  Timeframe for Long-term Goals: 3 weeks    LONG TERM GOAL #1:  Goal 1: Pt will improve cognitive functioning to a supervision level or higher to optimize cognitive performacne for safe return to home setting and ADL/IADL completion with support of family. ONGOING     EDUCATION:  Learner: Patient  Education:  Reviewed ST goals and Plan of Care  Evaluation of Education: Verbalizes understanding, Demonstrates with assistance, Needs further instruction and Family not present    ASSESSMENT/PLAN:  Activity Tolerance:  Patient tolerance of  treatment: good. Appropriate engagement and participation within session      Assessment/Plan: Patient progressing toward established goals. Continues to require skilled care of licensed speech pathologist to progress toward achievement of established goals and plan of care. .     Plan for Next Session: memory, mental manipulation/flexibility, organization          Shannan Cruz M.A., [address] [place] [address]

## 2020-11-13 NOTE — PROGRESS NOTES
Activity Tolerance:  Patient tolerance of  treatment: good. Discharge Recommendations: Home with Home health OT, Continue to assess pending progress   Equipment Recommendations: Equipment Needed: No  Other: Monitor needs  Plan: Times per week: 6x  Current Treatment Recommendations: Strengthening, Endurance Training, Balance Training, Functional Mobility Training, Patient/Caregiver Education & Training, Self-Care / ADL, Home Management Training, Safety Education & Training    Patient Education  Patient Education: ADL's and Home Exercise Program    Goals  Short term goals  Time Frame for Short term goals: 2 weeks  Short term goal 1: Pt will complete BUE light resistive exercises with min vcs for safety to increase indep and safety with all self cares and transfers. Short term goal 2: Pt will complete standing tolerance x3 minutes with 1-2 UE and SBA release to increase indep and safety with all grooming. Short term goal 3: Pt will complete functional mobility to/from BR and HH distances with SBA and min vcs for safety to increase indep and safety with all toileting. Short term goal 4: Pt will complete LB dressing with LHAE and min A to increase indep and safety with all self cares. Long term goals  Time Frame for Long term goals : 4 weeks  Long term goal 1: Pt will complete BADL routine Mod indep with 0 vcs for safety and O2 stats remaining above 90% to increase indep and endurance in home environment. Long term goal 2: Pt will complete simple IADL with S and 0 vcs for safety to increase indep with getting a snack. Following session, patient left in safe position with all fall risk precautions in place.

## 2020-11-13 NOTE — PROGRESS NOTES
[facility]  INPATIENT PHYSICAL THERAPY  DAILY NOTE  - [facility] - 8E-59/059-A    Time In: 6733  Time Out: 1200  Timed Code Treatment Minutes: 42 Minutes  Minutes: 42          Date: 2020  Patient Name: Sandy Serna,  Gender:  male        MRN: [id number]  : 1934  (80 y.o.)     Referring Practitioner: Attending: Joan Menendez MD; Ordering: Kentrell Frye MD  Diagnosis: COVID-19  Additional Pertinent Hx: Per EMR, Katelyn Sidhu is a 80 y.o. male who presents to the Emergency Department for the evaluation of progressive weakness, fatigue, cough. Symptoms have been developing over the past 4 to 5 days. \"     Prior Level of Function:  Lives With: Spouse  Type of Home: House  Home Layout: Two level, Performs ADL's on one level, Able to Live on Main level with bedroom/bathroom  Home Access: Stairs to enter with rails  Entrance Stairs - Number of Steps: 1 CHRISTY with wilmer rails  Home Equipment: Rolling walker   Bathroom Shower/Tub: Tub/Shower unit, Shower chair with back  Bathroom Toilet: Handicap height  Bathroom Equipment: Hand-held shower  Bathroom Accessibility: Accessible    ADL Assistance: Independent(Pt reports Indep with dressing, toileting, and showers.)  Homemaking Assistance: Needs assistance(Spouse complete cleaning and laundry. Kids bring in meals frequently or they go out to eat.)  Homemaking Responsibilities: Yes  Ambulation Assistance: Independent  Transfer Assistance: Independent  Active : Yes    Restrictions/Precautions:  Restrictions/Precautions: General Precautions, Fall Risk  Position Activity Restriction  Other position/activity restrictions: COVID 19+, -Room air     SUBJECTIVE: Patient in recliner upon arrival and agreeable to therapy. Patient on 2L O2, O2 sats monitored throughout session, 91% at best, mid 80s% at worst. Educated on pursed lip breathing, incentive spirometer, accapella and to drink more.      PAIN: denies    OBJECTIVE:  Bed Mobility:  Not Tested    Transfers:  Sit to Stand: Stand By Assistance, with increased time for completion, cues for hand placement, with verbal cues  Stand to Sit:Stand By Assistance, [employer] Assistance, with increased time for completion, cues for hand placement, with verbal cues    Ambulation:  Contact Guard Assistance  Distance: ~20ft x1  Surface: Level Tile  Device:Rolling Walker  Gait Deviations: Forward Flexed Posture, Slow Nahed, Decreased Step Length Bilaterally, Decreased Weight Shift Bilaterally, Decreased Trunk Rotation, Decreased Gait Speed, Decreased Heel Strike Bilaterally, Wide Base of Support, Mild Path Deviations and decreased hip/knee flexion    Exercise:  Patient was guided in 1 set(s) 15 reps of exercise to both lower extremities. Ankle pumps, Quad sets, Heelslides, Hip abduction/adduction, Straight leg raises, Seated marches, Long arc quads and Seated isometric hip adduction. Exercises were completed for increased independence with functional mobility. **breathing exercises 1x10 reps each: incentive spirometer and accapella    Functional Outcome Measures: Not completed       ASSESSMENT:  Assessment: Patient progressing toward established goals. Activity Tolerance:  Patient tolerance of  treatment: fair. Equipment Recommendations: Other: monitor for needs  Discharge Recommendations:  Continue to assess pending progress    Plan: Times per week: 6x  Current Treatment Recommendations: Strengthening, Balance Training, Endurance Training, Functional Mobility Training, Transfer Training, Gait Training, Stair training, Patient/Caregiver Education & Training, Safety Education & Training, Home Exercise Program    Patient Education  Patient Education: Plan of Care, Precautions/Restrictions, Equipment Education, Transfers, Gait, Up in Chair for Loan NamHéctor, Verbal Exercise Instruction    Goals:  Patient goals : go home  Short term goals  Time Frame for Short term goals: 1 week  Short term goal 1: Pt to be Mod I for supine <> sit to get in/out of bed  Short term goal 2: Pt to be Mod I for sit <> stand to get up to ambulate  Short term goal 3: Pt to ambulate > 150 ft with RW with Supervision for household distances  Short term goal 4: Pt to negotiate 1 step with 1-2 rails or RW with SBA for home access  Long term goals  Time Frame for Long term goals : not set due to short ELOS    Following session, patient left in safe position with all fall risk precautions in place.

## 2020-11-14 PROCEDURE — 6370000000 HC RX 637 (ALT 250 FOR IP): Performed by: INTERNAL MEDICINE

## 2020-11-14 PROCEDURE — 97110 THERAPEUTIC EXERCISES: CPT

## 2020-11-14 PROCEDURE — 6370000000 HC RX 637 (ALT 250 FOR IP): Performed by: FAMILY MEDICINE

## 2020-11-14 PROCEDURE — 1290000000 HC SEMI PRIVATE OTHER R&B

## 2020-11-14 PROCEDURE — 97116 GAIT TRAINING THERAPY: CPT

## 2020-11-14 PROCEDURE — 6360000002 HC RX W HCPCS: Performed by: INTERNAL MEDICINE

## 2020-11-14 PROCEDURE — 2500000003 HC RX 250 WO HCPCS: Performed by: INTERNAL MEDICINE

## 2020-11-14 RX ADMIN — MICONAZOLE NITRATE: 20 POWDER TOPICAL at 08:36

## 2020-11-14 RX ADMIN — FAMOTIDINE 20 MG: 20 TABLET, FILM COATED ORAL at 08:34

## 2020-11-14 RX ADMIN — Medication 1 CAPSULE: at 08:35

## 2020-11-14 RX ADMIN — FINASTERIDE 5 MG: 5 TABLET, FILM COATED ORAL at 08:35

## 2020-11-14 RX ADMIN — METOPROLOL TARTRATE 50 MG: 50 TABLET, FILM COATED ORAL at 08:35

## 2020-11-14 RX ADMIN — Medication 5 UNITS: at 14:44

## 2020-11-14 RX ADMIN — ASPIRIN 325 MG: 325 TABLET, FILM COATED ORAL at 08:34

## 2020-11-14 RX ADMIN — ENOXAPARIN SODIUM 30 MG: 30 INJECTION SUBCUTANEOUS at 20:28

## 2020-11-14 RX ADMIN — FUROSEMIDE 40 MG: 40 TABLET ORAL at 08:35

## 2020-11-14 RX ADMIN — METOPROLOL TARTRATE 50 MG: 50 TABLET, FILM COATED ORAL at 20:27

## 2020-11-14 RX ADMIN — POTASSIUM CHLORIDE 20 MEQ: 1500 TABLET, EXTENDED RELEASE ORAL at 08:34

## 2020-11-14 RX ADMIN — MICONAZOLE NITRATE: 20 POWDER TOPICAL at 20:27

## 2020-11-14 RX ADMIN — ENOXAPARIN SODIUM 30 MG: 30 INJECTION SUBCUTANEOUS at 08:35

## 2020-11-14 RX ADMIN — ATORVASTATIN CALCIUM 20 MG: 20 TABLET, FILM COATED ORAL at 20:27

## 2020-11-14 ASSESSMENT — PAIN SCALES - GENERAL
PAINLEVEL_OUTOF10: 0
PAINLEVEL_OUTOF10: 0

## 2020-11-14 NOTE — PROGRESS NOTES
[address] [address]  INPATIENT PHYSICAL THERAPY  DAILY NOTE  - [place] SKILLED NURSING UNIT - 8E-59/059-A      Time In: 3563  Time Out: 1020  Timed Code Treatment Minutes: 39 Minutes  Minutes: 41          Date: 2020  Patient Name: Nahid Jacob,  Gender:  male        MRN: [id number]  : 1934  (80 y.o.)     Referring Practitioner: Attending: Jennifer Garcia MD; Ordering: Selvin Milian MD  Diagnosis: COVID-19  Additional Pertinent Hx: Per EMR, Monserrat Durán is a 80 y.o. male who presents to the Emergency Department for the evaluation of progressive weakness, fatigue, cough. Symptoms have been developing over the past 4 to 5 days. \"     Prior Level of Function:  Lives With: Spouse  Type of Home: House  Home Layout: Two level, Performs ADL's on one level, Able to Live on Main level with bedroom/bathroom  Home Access: Stairs to enter with rails  Entrance Stairs - Number of Steps: 1 CHRISTY with wilmer rails  Home Equipment: Rolling walker   Bathroom Shower/Tub: Tub/Shower unit, Shower chair with back  Bathroom Toilet: Handicap height  Bathroom Equipment: Hand-held shower  Bathroom Accessibility: Accessible    ADL Assistance: Independent(Pt reports Indep with dressing, toileting, and showers.)  Homemaking Assistance: Needs assistance(Spouse complete cleaning and laundry.  Kids bring in meals frequently or they go out to eat.)  Homemaking Responsibilities: Yes  Ambulation Assistance: Independent  Transfer Assistance: Independent  Active : Yes    Restrictions/Precautions:  Restrictions/Precautions: General Precautions, Fall Risk  Position Activity Restriction  Other position/activity restrictions: COVID 19+, -Room air,  2 L02     SUBJECTIVE: pt up in chair on arrival he cont to be on 2L o2 throughout session with rest breaks and cues for deep breathing he did drop to 89% after first walk and took several min to recover above 90% however rest of session he was above 90%     PAIN: 010: OBJECTIVE:  Bed Mobility:  Not Tested    Transfers:  Sit to Stand: Stand By Assistance, several times from the recliner chair he did however require cues for safe hand placement and it took him more than one attempt at times, from w/c he needed CGA again with more than one attempt cues for hands and wt shift forward this surface was a little lower than recliner   Stand to [facility], he needed several cues for safety to back all the way up to the surface and reach back wtih both UEs to control descend    Ambulation:  Contact Guard Assistance with assist to manage O2 tank and for O2 tubing awareness   Distance: 40x1 50x1  Surface: Level Tile  Device:Rolling Walker  Gait Deviations:  Slow raymond with wide base of support decreased heel strike and toe off along with shorten step length, he was on 2L o2 and required assist to manage tank and tubing, didn't follow w/ the w/c had him walk to a destination   Stairs:  Minimal Assistance  Number of Steps: 2x2 reps   Height: 4\" step with with wilmer UEs at support he stepped up forward w/ right LE and down with left retro       Exercise:  Patient was guided in 1 set(s) 10 reps of exercise to both lower extremities. Ankle pumps, Glut sets, Quad sets, Heelslides, Hip abduction/adduction, Seated hamstring curls, Seated heel/toe raises, Long arc quads and seated breathing ex trunk extension with shoulder rolls, and shoulder flex with trunk extension ,. Exercises were completed for increased independence with functional mobility. Functional Outcome Measures: Not completed       ASSESSMENT:  Assessment: Patient progressing toward established goals. Activity Tolerance:  Patient tolerance of  treatment: fair. Equipment Recommendations: Other: monitor for needs  Discharge Recommendations:    Continue to assess pending progress    Plan: Times per week: 6x  Current Treatment Recommendations: Strengthening, Balance Training, Endurance Training, Functional Mobility Training, Transfer Training, Gait Training, Stair training, Patient/Caregiver Education & Training, Safety Education & Training, Home Exercise Program    Patient Education  Patient Education: Plan of Care    Goals:  Patient goals : go home  Short term goals  Time Frame for Short term goals: 1 week  Short term goal 1: Pt to be Mod I for supine <> sit to get in/out of bed  Short term goal 2: Pt to be Mod I for sit <> stand to get up to ambulate  Short term goal 3: Pt to ambulate > 150 ft with RW with Supervision for household distances  Short term goal 4: Pt to negotiate 1 step with 1-2 rails or RW with SBA for home access  Long term goals  Time Frame for Long term goals : not set due to short ELOS    Following session, patient left in safe position with all fall risk precautions in place.

## 2020-11-14 NOTE — PROGRESS NOTES
RT applied patients CPAP approximately 1010PM.  Patient states unable to tolerate it and removed at 11PM.  Oxygen reapplied via nasal cannula at 2 L/min. Patients lung sounds diminished throughout with expiratory wheezes noted. Frequent productive cough.

## 2020-11-15 PROCEDURE — 1290000000 HC SEMI PRIVATE OTHER R&B

## 2020-11-15 PROCEDURE — 6370000000 HC RX 637 (ALT 250 FOR IP): Performed by: INTERNAL MEDICINE

## 2020-11-15 PROCEDURE — 6360000002 HC RX W HCPCS: Performed by: INTERNAL MEDICINE

## 2020-11-15 PROCEDURE — 6370000000 HC RX 637 (ALT 250 FOR IP): Performed by: FAMILY MEDICINE

## 2020-11-15 PROCEDURE — 97535 SELF CARE MNGMENT TRAINING: CPT

## 2020-11-15 RX ORDER — ZINC SULFATE 50(220)MG
50 CAPSULE ORAL DAILY
Status: DISCONTINUED | OUTPATIENT
Start: 2020-11-16 | End: 2020-11-18 | Stop reason: HOSPADM

## 2020-11-15 RX ORDER — ASCORBIC ACID 500 MG
1000 TABLET ORAL DAILY
Status: DISCONTINUED | OUTPATIENT
Start: 2020-11-16 | End: 2020-11-18 | Stop reason: HOSPADM

## 2020-11-15 RX ORDER — VITAMIN B COMPLEX
1000 TABLET ORAL DAILY
Status: DISCONTINUED | OUTPATIENT
Start: 2020-11-16 | End: 2020-11-18 | Stop reason: HOSPADM

## 2020-11-15 RX ADMIN — ASPIRIN 325 MG: 325 TABLET, FILM COATED ORAL at 09:27

## 2020-11-15 RX ADMIN — POTASSIUM CHLORIDE 20 MEQ: 1500 TABLET, EXTENDED RELEASE ORAL at 09:27

## 2020-11-15 RX ADMIN — MICONAZOLE NITRATE: 20 POWDER TOPICAL at 19:40

## 2020-11-15 RX ADMIN — FUROSEMIDE 40 MG: 40 TABLET ORAL at 09:27

## 2020-11-15 RX ADMIN — Medication 1 CAPSULE: at 09:27

## 2020-11-15 RX ADMIN — STANDARDIZED SENNA CONCENTRATE 8.6 MG: 8.6 TABLET ORAL at 19:36

## 2020-11-15 RX ADMIN — ENOXAPARIN SODIUM 30 MG: 30 INJECTION SUBCUTANEOUS at 19:36

## 2020-11-15 RX ADMIN — METOPROLOL TARTRATE 50 MG: 50 TABLET, FILM COATED ORAL at 09:27

## 2020-11-15 RX ADMIN — FINASTERIDE 5 MG: 5 TABLET, FILM COATED ORAL at 09:27

## 2020-11-15 RX ADMIN — FAMOTIDINE 20 MG: 20 TABLET, FILM COATED ORAL at 09:27

## 2020-11-15 RX ADMIN — DOCUSATE SODIUM 100 MG: 100 CAPSULE, LIQUID FILLED ORAL at 09:27

## 2020-11-15 RX ADMIN — ATORVASTATIN CALCIUM 20 MG: 20 TABLET, FILM COATED ORAL at 19:35

## 2020-11-15 RX ADMIN — POLYETHYLENE GLYCOL 3350 17 G: 17 POWDER, FOR SOLUTION ORAL at 09:27

## 2020-11-15 RX ADMIN — MICONAZOLE NITRATE: 20 POWDER TOPICAL at 09:40

## 2020-11-15 RX ADMIN — ENOXAPARIN SODIUM 30 MG: 30 INJECTION SUBCUTANEOUS at 09:27

## 2020-11-15 RX ADMIN — METOPROLOL TARTRATE 50 MG: 50 TABLET, FILM COATED ORAL at 19:35

## 2020-11-15 ASSESSMENT — PAIN SCALES - GENERAL
PAINLEVEL_OUTOF10: 0
PAINLEVEL_OUTOF10: 0

## 2020-11-15 NOTE — PLAN OF CARE
Problem: Gas Exchange - Impaired  Goal: Absence of hypoxia  Outcome: Ongoing  Remains on oxygen at 2L with saturations greater than 90%    Problem: Nutrition Deficits  Goal: Optimize nutrtional status  Outcome: Ongoing  Tolerating current diet and po fluids well. Poor appetite with solid foods, but does drink supplement. Problem: Falls - Risk of:  Goal: Will remain free from falls  Description: Will remain free from falls  Outcome: Ongoing   Falling star prevention in place. Bed and chair alarms in use. Call light in reach. Purposeful hourly rounding. Problem: Skin Integrity:  Goal: Absence of new skin breakdown  Description: Absence of new skin breakdown  Outcome: Ongoing  No new skin breakdown noted since admission. Problem: Bleeding:  Goal: Will show no signs and symptoms of excessive bleeding  Description: Will show no signs and symptoms of excessive bleeding  Outcome: Ongoing  No signs of excessive bleeding noted this shift. Problem: Pain:  Goal: Pain level will decrease  Description: Pain level will decrease  Outcome: Ongoing  Pain decreases with rest, repositioning, ice application, and prn pain medications.

## 2020-11-15 NOTE — PROGRESS NOTES
[address] [address]  [address]  Occupational Therapy  Daily Note  Time:   Time In: 1100  Time Out: 1130  Timed Code Treatment Minutes: 30 Minutes  Minutes: 30          Date: 11/15/2020  Patient Name: Roxana Thibodeaux,   Gender: male      Room: [facility]/059-A  MRN: [id number]  : 1934  (80 y.o.)  Referring Practitioner: Ordering: Betty Mitchell MD Attending: Dr. Hyacinth Plummer  Diagnosis: Covid 19  Additional Pertinent Hx: Per EMR, Sherri Marrero is a 80 y.o. male who presents to the Emergency Department for the evaluation of progressive weakness, fatigue, cough. Symptoms have been developing over the past 4 to 5 days. \" Pt admitted to TCU on 20. Restrictions/Precautions:  Restrictions/Precautions: General Precautions, Fall Risk  Position Activity Restriction  Other position/activity restrictions: COVID 19+, -Room air,  2 L02     SUBJECTIVE: Patient in bed upon arrival agreeble to OT treatment     PAIN: no     COGNITION: Slow Processing, Decreased Insight, Decreased Problem Solving and Decreased Safety Awareness    ADL:   Grooming: Contact Guard Assistance. washing hands   Toileting: Contact Guard Assistance. required max vcs for problem solving correct body positioning to sit while urinating   Toilet Transfer: [address] vcs for safe technique with walker placement . BALANCE:  Sitting Balance:  Supervision. Standing Balance: Contact Guard Assistance. BED MOBILITY:  Supine to Sit: Moderate Assistance      TRANSFERS:  Sit to Stand:  Minimal Assistance. Stand to Sit: Minimal Assistance. FUNCTIONAL MOBILITY:  Assistive Device: Rolling Walker  Assist Level:  Contact Guard Assistance. Distance: To and from bathroom     ASSESSMENT:     Activity Tolerance:  Patient tolerance of  treatment: fair.         Discharge Recommendations: Home with Home health OT, Continue to assess pending progress   Equipment Recommendations: Equipment Needed: No  Other: Monitor needs  Plan: Times per week: 6x  Current Treatment Recommendations: Strengthening, Endurance Training, Balance Training, Functional Mobility Training, Patient/Caregiver Education & Training, Self-Care / ADL, Home Management Training, Safety Education & Training    Patient Education  Patient Education: ADL's    Goals  Short term goals  Time Frame for Short term goals: 2 weeks  Short term goal 1: Pt will complete BUE light resistive exercises with min vcs for safety to increase indep and safety with all self cares and transfers. Short term goal 2: Pt will complete standing tolerance x3 minutes with 1-2 UE and SBA release to increase indep and safety with all grooming. Short term goal 3: Pt will complete functional mobility to/from BR and HH distances with SBA and min vcs for safety to increase indep and safety with all toileting. Short term goal 4: Pt will complete LB dressing with LHAE and min A to increase indep and safety with all self cares. Long term goals  Time Frame for Long term goals : 4 weeks  Long term goal 1: Pt will complete BADL routine Mod indep with 0 vcs for safety and O2 stats remaining above 90% to increase indep and endurance in home environment. Long term goal 2: Pt will complete simple IADL with S and 0 vcs for safety to increase indep with getting a snack. Following session, patient left in safe position with all fall risk precautions in place.

## 2020-11-16 ENCOUNTER — APPOINTMENT (OUTPATIENT)
Dept: GENERAL RADIOLOGY | Age: 85
DRG: 177 | End: 2020-11-16
Attending: FAMILY MEDICINE
Payer: MEDICARE

## 2020-11-16 LAB
ANION GAP SERPL CALCULATED.3IONS-SCNC: 11 MEQ/L (ref 8–16)
BASOPHILS # BLD: 0.8 %
BASOPHILS ABSOLUTE: 0 THOU/MM3 (ref 0–0.1)
BUN BLDV-MCNC: 19 MG/DL (ref 7–22)
CALCIUM SERPL-MCNC: 9.2 MG/DL (ref 8.5–10.5)
CHLORIDE BLD-SCNC: 99 MEQ/L (ref 98–111)
CO2: 28 MEQ/L (ref 23–33)
CREAT SERPL-MCNC: 1.1 MG/DL (ref 0.4–1.2)
EOSINOPHIL # BLD: 2.7 %
EOSINOPHILS ABSOLUTE: 0.2 THOU/MM3 (ref 0–0.4)
ERYTHROCYTE [DISTWIDTH] IN BLOOD BY AUTOMATED COUNT: 12.9 % (ref 11.5–14.5)
ERYTHROCYTE [DISTWIDTH] IN BLOOD BY AUTOMATED COUNT: 47.3 FL (ref 35–45)
GFR SERPL CREATININE-BSD FRML MDRD: 63 ML/MIN/1.73M2
GLUCOSE BLD-MCNC: 109 MG/DL (ref 70–108)
HCT VFR BLD CALC: 39.8 % (ref 42–52)
HEMOGLOBIN: 12.8 GM/DL (ref 14–18)
IMMATURE GRANS (ABS): 0.01 THOU/MM3 (ref 0–0.07)
IMMATURE GRANULOCYTES: 0.2 %
LYMPHOCYTES # BLD: 14.7 %
LYMPHOCYTES ABSOLUTE: 0.9 THOU/MM3 (ref 1–4.8)
MCH RBC QN AUTO: 32.2 PG (ref 26–33)
MCHC RBC AUTO-ENTMCNC: 32.2 GM/DL (ref 32.2–35.5)
MCV RBC AUTO: 100 FL (ref 80–94)
MONOCYTES # BLD: 7.7 %
MONOCYTES ABSOLUTE: 0.5 THOU/MM3 (ref 0.4–1.3)
NUCLEATED RED BLOOD CELLS: 0 /100 WBC
PLATELET # BLD: 387 THOU/MM3 (ref 130–400)
PMV BLD AUTO: 9.4 FL (ref 9.4–12.4)
POTASSIUM SERPL-SCNC: 5 MEQ/L (ref 3.5–5.2)
RBC # BLD: 3.98 MILL/MM3 (ref 4.7–6.1)
SEG NEUTROPHILS: 73.9 %
SEGMENTED NEUTROPHILS ABSOLUTE COUNT: 4.4 THOU/MM3 (ref 1.8–7.7)
SODIUM BLD-SCNC: 138 MEQ/L (ref 135–145)
WBC # BLD: 6 THOU/MM3 (ref 4.8–10.8)

## 2020-11-16 PROCEDURE — 6360000002 HC RX W HCPCS: Performed by: INTERNAL MEDICINE

## 2020-11-16 PROCEDURE — 97110 THERAPEUTIC EXERCISES: CPT

## 2020-11-16 PROCEDURE — 97129 THER IVNTJ 1ST 15 MIN: CPT

## 2020-11-16 PROCEDURE — 1290000000 HC SEMI PRIVATE OTHER R&B

## 2020-11-16 PROCEDURE — 6370000000 HC RX 637 (ALT 250 FOR IP): Performed by: INTERNAL MEDICINE

## 2020-11-16 PROCEDURE — 80048 BASIC METABOLIC PNL TOTAL CA: CPT

## 2020-11-16 PROCEDURE — 97130 THER IVNTJ EA ADDL 15 MIN: CPT

## 2020-11-16 PROCEDURE — 36415 COLL VENOUS BLD VENIPUNCTURE: CPT

## 2020-11-16 PROCEDURE — 97535 SELF CARE MNGMENT TRAINING: CPT

## 2020-11-16 PROCEDURE — 97530 THERAPEUTIC ACTIVITIES: CPT

## 2020-11-16 PROCEDURE — 6370000000 HC RX 637 (ALT 250 FOR IP): Performed by: FAMILY MEDICINE

## 2020-11-16 PROCEDURE — 97116 GAIT TRAINING THERAPY: CPT

## 2020-11-16 PROCEDURE — 85025 COMPLETE CBC W/AUTO DIFF WBC: CPT

## 2020-11-16 PROCEDURE — 71046 X-RAY EXAM CHEST 2 VIEWS: CPT

## 2020-11-16 RX ADMIN — MUPIROCIN: 20 OINTMENT TOPICAL at 22:08

## 2020-11-16 RX ADMIN — Medication 1000 UNITS: at 09:31

## 2020-11-16 RX ADMIN — Medication 1 CAPSULE: at 09:30

## 2020-11-16 RX ADMIN — ASPIRIN 325 MG: 325 TABLET, FILM COATED ORAL at 09:31

## 2020-11-16 RX ADMIN — OXYCODONE HYDROCHLORIDE AND ACETAMINOPHEN 1000 MG: 500 TABLET ORAL at 09:31

## 2020-11-16 RX ADMIN — ATORVASTATIN CALCIUM 20 MG: 20 TABLET, FILM COATED ORAL at 19:59

## 2020-11-16 RX ADMIN — FINASTERIDE 5 MG: 5 TABLET, FILM COATED ORAL at 09:31

## 2020-11-16 RX ADMIN — MICONAZOLE NITRATE: 20 POWDER TOPICAL at 09:31

## 2020-11-16 RX ADMIN — POTASSIUM CHLORIDE 20 MEQ: 1500 TABLET, EXTENDED RELEASE ORAL at 09:31

## 2020-11-16 RX ADMIN — FAMOTIDINE 20 MG: 20 TABLET, FILM COATED ORAL at 09:31

## 2020-11-16 RX ADMIN — METOPROLOL TARTRATE 50 MG: 50 TABLET, FILM COATED ORAL at 09:31

## 2020-11-16 RX ADMIN — MICONAZOLE NITRATE: 20 POWDER TOPICAL at 22:08

## 2020-11-16 RX ADMIN — Medication 50 MG: at 09:31

## 2020-11-16 RX ADMIN — ENOXAPARIN SODIUM 30 MG: 30 INJECTION SUBCUTANEOUS at 22:08

## 2020-11-16 RX ADMIN — ENOXAPARIN SODIUM 30 MG: 30 INJECTION SUBCUTANEOUS at 09:30

## 2020-11-16 RX ADMIN — METOPROLOL TARTRATE 50 MG: 50 TABLET, FILM COATED ORAL at 19:59

## 2020-11-16 RX ADMIN — FUROSEMIDE 40 MG: 40 TABLET ORAL at 09:31

## 2020-11-16 ASSESSMENT — PAIN SCALES - GENERAL
PAINLEVEL_OUTOF10: 0
PAINLEVEL_OUTOF10: 0

## 2020-11-16 ASSESSMENT — PAIN - FUNCTIONAL ASSESSMENT: PAIN_FUNCTIONAL_ASSESSMENT: ACTIVITIES ARE NOT PREVENTED

## 2020-11-16 NOTE — PATIENT CARE CONFERENCE
[address] [address]  Transitional Care Unit  Team Conference Note    Patient Name: Dina Serrano   MRN: [id number]    : 1934  (80 y.o.)  Gender: male   Referring Practitioner: Attending: Cristina Rincon MD; Ordering: Juli Saez MD  Diagnosis: COVID-19    Team Conference Date: 2020   Admission Date:    30:84 PM  Re-Certification Date:     :  Tentative Discharge Plan and current psychosocial issues:Patient is an 31-year-old  male who was admitted to [facility] following a stay on acute for COVID. Patient and his spouse live together in a private residence, they have supportive family who assist regularly. Patient plans to return home at time of discharge, he was an active , completing finances, medications, and all personal care independently prior to hospital stay. He and his wife share household responsibilities.  anticipates patient may need ongoing skilled therapies at time of discharge. Nursing:     Weight:  Weight: has been stable     Wounds/Incisions/Ulcers:  No skin/wound issues identified  Bowel: continent  Bladder:continent     Pain: Patient's pain is currently controlled with tylenol    Education Provided:  Diabetic:  No  Peg Tube:No    Clements Care:  Education:No  Removal Necessary:  NA  Supplies Needed: NA     Anticoagulant Use:  Patient on lovenox for anticoagulation, which is being managed by Primary Care Physician    Antibiotic Use:  Patient not on antibiotics    Psychotropic Medication Use:  Patient not on psychotropic medications. Oxygen Use: Yes 2L    Home Medications Reconciled: N/A    Physical Therapy:   Patient is making good progress towards goals set for him, progressing sit < > stand from CGA/min assist to CGA and  progressed gait from CGA/min A to CGA and is ambulating increased distances. Patient requires verbal cues for safety and 2L O2.  Pt independent at OF   Patient would benefit from continued skilled PT services to improve his ability to complete functional mobility, reduce his risk for falls and allow patient to return to PLOF. PT Equipment Recommendations  Other: monitor for needs    Occupational  Therapy:  Pt is making fair progress towards goals. Factors facilitating goal achievement include: pleasant, good family support. Barriers to goal achievement include: Confusion, poor endurance. Family education has been addressed on the following topics: n/a. Pt continues to require skilled Occupational Therapy to address the following performance deficits balance, endurance, safety awareness, and activity tolerance. Without skilled OT intervention pt is at risk for functional decline, increased falls, and readmission to hospital.     Equipment: Family requesting BSC at discharge      Speech Therapy: For this therapy reporting period, 2/4 STGs and 0/1 LTG achieved. Overall gains obtained r/t functional recall skills (temporal and spatial orientation, staff relations, 3-4 units) as well as problem solving and reasoning for home scenarios. Notable deficits however continue for mental flexibility and organizational thought, concerning for potential return to management of IADLs (medications, basic finances, household obligations, etc) with direct familial assistance to be required. Moderately complex attention skills yet to be targeted, albeit, NO return to driving until physician clearance and/or OP driving simulation is completed. Further progress is foreseen with continuation of services, no anticipated barriers at this time. Skilled ST services are recommended to address the above aforementioned impairments to obtain optimal level of functionality to assist with re-integration into home environment. 24/7 supervision is encouraged at d/c. Nutrition:    Weight: 258 lb (117 kg) / Body mass index is 39.23 kg/m². Please see nutrition note for details.     Family Education: No family available for education  Fall Risk: Falling star program initiated    Goal Achievement:   Patient Continues to progress toward goal achievement    Discharge Plan   Estimated Length of Stay: 1 days  Destination: home health  Services at Discharge: [address], Occupational Therapy, Nursing and aide 3x week  Equipment at Discharge: Needs: BSC  Factors facilitating achievement of predicted outcomes: Family support, Cooperative and Pleasant  Barriers to the achievement of predicted outcomes: Cognitive deficit, Decreased endurance, Upper extremity weakness, Lower extremity weakness, Long standing deficits and Medical complications    Readmission Risk              Risk of Unplanned Readmission:        14         High (20-31)     Moderate (10-19)     Low (0-9)    Team Members Present at Conference:  Physician: Dr. Sophie Dominguez MD  Nurse: Lakesha Guallpa RN, Lorenzo Rg LPN  :  Katherine Bonds, [employer]  Occupational Therapist:  GERMÁN Palumbo  Physical Therapist:   Paco Foote PT  Speech Therapist: Speech Therapist: N/A. All team members have reviewed and updated as necessary and appropriate the established interdisciplinary plan of care within the medical record of Jeffry Mackey. Pt's team conference attended (see above). Pt seen on rounds with LSW, to review the results with patient and family. Discussed length of stay as above. Pt's team conference attended (see above.)  Pt seen on rounds with LSW, to review the results with patient and family. Discussed length of stay as above.     Physical Exam:  General:  Alert and oriented  Vitals:   Vitals:    11/17/20 0811   BP: (!) 129/90   Pulse: 81   Resp: 24   Temp: 98 °F (36.7 °C)   SpO2: 92%     Heart:  Regular rate and rhythm  Lungs:  Clear to auscultation  Abdomen:  soft with positive bowel sounds   Skin atrophic  Head normocephalic  Membranes moist  Neuro weak  Psy cooperative    Assessment:  Progressing in full rehabilitation program (see above)    Plan:  Continue Rehab            Continue current medications            Spent 40 minutes today in patient management and care    Electronically signed by Kenny Still MD on 11/17/2020 at 8:37 AM

## 2020-11-16 NOTE — PLAN OF CARE
Problem: Falls - Risk of:  Goal: Will remain free from falls  Description: Will remain free from falls  Outcome: Ongoing  Note: Patient remains free from falls this shift. Fall precautions in place. Non skid footwear used with transferring. Educated patient to use call light when needed assistance with ambulation. Patient used call light appropriate this shift. Problem: Skin Integrity:  Goal: Will show no infection signs and symptoms  Description: Will show no infection signs and symptoms  Outcome: Ongoing  Note: No skin breakdown during hospitalization. Problem: Discharge Planning:  Goal: Patients continuum of care needs are met  Description: Patients continuum of care needs are met  Outcome: Ongoing  Note: Patients needs met this shift. Patients discharge to be determined. Assess at discharge.

## 2020-11-16 NOTE — PROGRESS NOTES
Patient: Angeline Ruth  Unit/Bed: 8E-59/059-A  YOB: 1934  MRN: [id number] Acct: [de-identified]   Admitting Diagnosis: COVID-23   Admit Date:  11/6/2020  Hospital Day: 9    Assessment:     Principal Problem:    Muscular deconditioning  Active Problems:    Obstructive sleep apnea    CKD (chronic kidney disease) stage 3, GFR 30-59 ml/min    Coronary artery disease involving coronary bypass graft of native heart without angina pectoris    History of TIA (transient ischemic attack) and stroke    BPH (benign prostatic hyperplasia)    Essential (primary) hypertension    COVID-19    Class 2 severe obesity due to excess calories with serious comorbidity and body mass index (BMI) of 39.0 to 39.9 in adult [facility])    COPD (chronic obstructive pulmonary disease) (HCC)    HLD (hyperlipidemia)  Resolved Problems:    * No resolved hospital problems. *      Plan:     Continue the therapies        Subjective:     Patient has no complaint of CP, or SOB. He feels he's getting some better. .   Medication side effects: none    Scheduled Meds:   [START ON 11/16/2020] Vitamin D  1,000 Units Oral Daily    [START ON 11/16/2020] vitamin C  1,000 mg Oral Daily    [START ON 11/16/2020] zinc sulfate  50 mg Oral Daily    enoxaparin  30 mg Subcutaneous Q12H    aspirin  325 mg Oral Daily    atorvastatin  20 mg Oral Nightly    finasteride  5 mg Oral Daily    furosemide  40 mg Oral Daily    lactobacillus  1 capsule Oral Daily    metoprolol tartrate  50 mg Oral BID    potassium chloride  20 mEq Oral Daily    ppd   Does not apply Weekly    miconazole   Topical BID    famotidine  20 mg Oral Daily     Continuous Infusions:  PRN Meds:acetaminophen **OR** acetaminophen, polyethylene glycol, docusate sodium, senna    Review of Systems  Pertinent items are noted in HPI.     Objective:     Patient Vitals for the past 8 hrs:   BP Temp Temp src Pulse Resp SpO2   11/15/20 1930 (!) 146/65 97.9 °F (36.6 °C) Oral 75 22 91 %     I/O last 3 completed shifts: In: 0299 [P.O.:1040]  Out: 675 [Urine:675]  I/O this shift:   In: 0 [P.O.:590]  Out: 600 [Urine:600]    BP (!) 146/65   Pulse 75   Temp 97.9 °F (36.6 °C) (Oral)   Resp 22   Ht 5' 8\" (1.727 m)   Wt 247 lb 3.2 oz (112.1 kg)   SpO2 91%   BMI 37.59 kg/m²     BP (!) 146/65   Pulse 75   Temp 97.9 °F (36.6 °C) (Oral)   Resp 22   Ht 5' 8\" (1.727 m)   Wt 247 lb 3.2 oz (112.1 kg)   SpO2 91%   BMI 37.59 kg/m²   General appearance: alert, appears stated age and cooperative  Head: Normocephalic, without obvious abnormality, atraumatic  Lungs: clear to auscultation bilaterally  Chest wall: no tenderness  Heart: regular rate and rhythm, S1, S2 normal, no murmur, click, rub or gallop  Abdomen: soft, non-tender; bowel sounds normal; no masses,  no organomegaly  Extremities: edema 1+  Skin: Skin color, texture, turgor normal. No rashes or lesions  Neurologic: weak      Electronically signed by Vivek Kim MD on 11/15/2020 at 10:58 PM

## 2020-11-16 NOTE — PROGRESS NOTES
[address] [place] THERAPY  [address] [facility]URSING UN[facility]  PROGRESS NOTE    TIME   SLP Individual Minutes  Time In: 7938  Time Out: 1412  Minutes: 24  Timed Code Treatment Minutes: 24 Minutes       Date: 2020  Patient Name: Rebecca Lyle      CSN: [id number]   : 1934  (80 y.o.)  Gender: male   Referring Physician: Alisa Christensen MD   Diagnosis: COVID-19   Secondary Diagnosis: Cognitive deficits   Precautions: Fall risk, contact isolation/droplet precautions   Current Diet: Regular diet and thin liquids   Swallowing Strategies: Standard Universal Swallow Precautions  Date of Last MBS: Not Applicable    Pain:  No pain reported. Subjective:  Pt resting in recliner chair upon arrival, alert and pleasant. Usage of Pocket Talker to amplify hearing acuity levels. Positive participation throughout skilled interventions. Cognitive performance notes to be highly variable. Short-Term Goals:  SHORT TERM GOAL #1:  Goal 1: Pt will complete functional immediate/delayed recall (focus on compensatory strategies) tasks with 70% accuracy given mod cues to improve retention of pertinent information. GOAL MET  REVISED GOAL: Pt will complete functional immediate/delayed recall (focus on compensatory strategies) tasks with 80% accuracy given mod cues to improve retention of pertinent information.    INTERVENTIONS: Orientation:   -Month: min cues  -Year: independent  -Place: independent  -Name of facility: min cues  -City: independent  -DAVON: independent  -Date: independent  -Time: independent    5-unit recall list  Topic: details relevant to treating ST  Strategies: writing information down (total assist), repetition/rehearsal (x3 trials)   -Immediate memory: 3/5 independent, 1/5 min cues, 1/5 mod cues   -Delay x10 minutes: 4/5 independent, 1/5 mod prompts    SHORT TERM GOAL #2:  Goal 2: Pt will complete problem solving, sequential analysis, and verbal/visual reasoning (time, money/math/finances, medications) tasks with 70% accuracy given mod cues to improve independence within ADL/IADL completion. GOAL MET  REVISED GOAL: Pt will complete problem solving, sequential analysis, and verbal/visual reasoning (time, money/math/finances, medications) tasks with 80% accuracy given mod cues to improve independence within ADL/IADL completion. INTERVENTIONS: DNT d/t focus on additional STGs    PRIOR SESSION:   Problem solving  -Home scenarios: 4/5 independent, 1/5 min cues; independent verbalization of emergency phone number 911  -Call light: independent recognition of established safety device with 3/3 justifications independent  *positive insight into deficits presenting (\"I know I couldn't go down the steps right now\")    Reasoning:  -Reasons to come to the hospital: 2/3 independent, 1/3 min cues  -Telling time via analog clocks: 10/12 independent, 1/12 max cues, 7/68 not able to elicit    SHORT TERM GOAL #3:  Goal 3: Pt will complete thought organization and divergent thinking tasks with 70% accuracy given mod cues/naming 5 items in 1 minute time frame across 2/3 trials (no cues) to improve processing speed and mental flexibilty. GOAL NOT MET  INTERVENTIONS: Thought organization:  -Yearly calendar, preventative health: 3/6 independent, 3/6 max assist to refer to calendar grid (deficits for mental flexibility)  *significant increased processing time required  *notable deficits for mental flexibility and organization     SHORT TERM GOAL #4:  Goal 4: Pt will complete moderately complex attention tasks with no more than 3 errors until task completion to permit potential return to driving and IADL management.  GOAL NOT YET ADDRESSED, CONTINUE  INTERVENTIONS: DNT d/t focus on other goals     Long-Term Goals:  Timeframe for Long-term Goals: 3 weeks  Goal 1: Pt will improve cognitive functioning to a supervision level or higher to optimize cognitive performacne for safe return to home setting and ADL/IADL completion with support of family. ONGOING     EDUCATION:  Learner: Patient  Education:  Reviewed ST goals and Plan of Care  Evaluation of Education: Verbalizes understanding, Demonstrates with assistance, Needs further instruction and Family not present    ASSESSMENT/PLAN:  SUMMARY: For this therapy reporting period, 2/4 STGs and 0/1 LTG achieved. Overall gains obtained r/t functional recall skills (temporal and spatial orientation, staff relations, 3-4 units) as well as problem solving and reasoning for home scenarios. Notable deficits however continue for mental flexibility and organizational thought, concerning for potential return to management of IADLs (medications, basic finances, household obligations, etc) with direct familial assistance to be required. Moderately complex attention skills yet to be targeted, albeit, NO return to driving until physician clearance and/or OP driving simulation is completed. Further progress is foreseen with continuation of services, no anticipated barriers at this time. Skilled ST services are recommended to address the above aforementioned impairments to obtain optimal level of functionality to assist with re-integration into home environment. 24/7 supervision is encouraged at d/c. Activity Tolerance:  Patient tolerance of  treatment: good. Appropriate engagement and participation within session      Assessment/Plan: Patient progressing toward established goals. Continues to require skilled care of licensed speech pathologist to progress toward achievement of established goals and plan of care. .     Plan for Next Session: problem solving, functional reasoning, attention          Silvestre Najera M.A., [address] [place] [address]

## 2020-11-16 NOTE — PROGRESS NOTES
[address] [address]  [address]  Occupational Therapy  Daily Note  Time:   Time In: 1388  Time Out: 6229  Timed Code Treatment Minutes: 47 Minutes  Minutes: 54          Date: 2020  Patient Name: Paulino Madera,   Gender: male      Room: [facility]/059-A  MRN: [id number]  : 1934  (80 y.o.)  Referring Practitioner: Ordering: Isha Smith MD Attending: Dr. Rodger Panda  Diagnosis: Covid 19  Additional Pertinent Hx: Per EMR, William Baptiste is a 80 y.o. male who presents to the Emergency Department for the evaluation of progressive weakness, fatigue, cough. Symptoms have been developing over the past 4 to 5 days. \" Pt admitted to TCU on 20. Restrictions/Precautions:  Restrictions/Precautions: General Precautions, Fall Risk  Position Activity Restriction  Other position/activity restrictions: COVID 19+, -Room air,  2 L02     SUBJECTIVE: Up in chair upon arrival, agreeable to OT session, increase initiation for task in comparison to other sessions    PAIN: 0/10:     COGNITION: Decreased Problem Solving    ADL:   Grooming: Minimal Assistance. Completed shaving sitting in bedside chair, A for thoroughness  Upper Extremity Dressing: Minimal Assistance. donned sitting in bedside chair, increased time required, A for pulling down shirt  Lower Extremity Dressing: Moderate Assistance. A for threading L LE, A for pulling up pants. BALANCE:  Standing Balance: Contact Guard Assistance. approx 1 minute with 1 UE support    BED MOBILITY:  Not Tested    TRANSFERS:  Sit to Stand:  Contact Guard Assistance. bedside chair  Stand to Sit: [employer].       ADDITIONAL ACTIVITIES:  Patient identified a personal goal to increase UB strength and improve overall endurance so they can complete their toilet & shower transfers; skilled edu on UE strengthening and patient completed BUE strengthening exercises x10 reps x1 set this date with a resistive band in all joints and all planes. Patient tolerated well, requiring minimal rest breaks. ASSESSMENT:    Pt is making fair progress towards goals. Factors facilitating goal achievement include: pleasant, good family support. Barriers to goal achievement include: Confusion, poor endurance. Family education has been addressed on the following topics: n/a. Pt continues to require skilled Occupational Therapy to address the following performance deficits balance, endurance, safety awareness, and activity tolerance. Without skilled OT intervention pt is at risk for functional decline, increased falls, and readmission to hospital.      Activity Tolerance:  Patient tolerance of  treatment: good. Discharge Recommendations: Home with Home health OT, Continue to assess pending progress   Equipment Recommendations: Equipment Needed: No  Other: Monitor needs  Plan: Times per week: 6x  Current Treatment Recommendations: Strengthening, Endurance Training, Balance Training, Functional Mobility Training, Patient/Caregiver Education & Training, Self-Care / ADL, Home Management Training, Safety Education & Training    Patient Education  Patient Education: ADL's and Home Exercise Program    Goals  Short term goals  Time Frame for Short term goals: 2 weeks  Short term goal 1: Pt will complete BUE light resistive exercises with min vcs for safety to increase indep and safety with all self cares and transfers. Short term goal 2: Pt will complete standing tolerance x3 minutes with 1-2 UE and SBA release to increase indep and safety with all grooming. Short term goal 3: Pt will complete functional mobility to/from BR and HH distances with SBA and min vcs for safety to increase indep and safety with all toileting. Short term goal 4: Pt will complete LB dressing with LHAE and min A to increase indep and safety with all self cares.   Long term goals  Time Frame for Long term goals : 4 weeks  Long term goal 1: Pt will complete BADL routine Mod indep with 0 vcs for safety and O2 stats remaining above 90% to increase indep and endurance in home environment. Long term goal 2: Pt will complete simple IADL with S and 0 vcs for safety to increase indep with getting a snack. Following session, patient left in safe position with all fall risk precautions in place.

## 2020-11-17 LAB
BILIRUBIN URINE: NEGATIVE
BLOOD, URINE: NEGATIVE
CHARACTER, URINE: CLEAR
COLOR: YELLOW
GLUCOSE URINE: NEGATIVE MG/DL
KETONES, URINE: NEGATIVE
LEUKOCYTE ESTERASE, URINE: NEGATIVE
NITRITE, URINE: NEGATIVE
PH UA: 8 (ref 5–9)
PROTEIN UA: NEGATIVE
SPECIFIC GRAVITY, URINE: 1.01 (ref 1–1.03)
UROBILINOGEN, URINE: 1 EU/DL (ref 0–1)

## 2020-11-17 PROCEDURE — 6370000000 HC RX 637 (ALT 250 FOR IP): Performed by: INTERNAL MEDICINE

## 2020-11-17 PROCEDURE — 81003 URINALYSIS AUTO W/O SCOPE: CPT

## 2020-11-17 PROCEDURE — 97116 GAIT TRAINING THERAPY: CPT

## 2020-11-17 PROCEDURE — 97530 THERAPEUTIC ACTIVITIES: CPT

## 2020-11-17 PROCEDURE — 6360000002 HC RX W HCPCS: Performed by: INTERNAL MEDICINE

## 2020-11-17 PROCEDURE — 51798 US URINE CAPACITY MEASURE: CPT

## 2020-11-17 PROCEDURE — 1290000000 HC SEMI PRIVATE OTHER R&B

## 2020-11-17 PROCEDURE — 97130 THER IVNTJ EA ADDL 15 MIN: CPT

## 2020-11-17 PROCEDURE — 97129 THER IVNTJ 1ST 15 MIN: CPT

## 2020-11-17 PROCEDURE — 6370000000 HC RX 637 (ALT 250 FOR IP): Performed by: FAMILY MEDICINE

## 2020-11-17 PROCEDURE — 97535 SELF CARE MNGMENT TRAINING: CPT

## 2020-11-17 RX ADMIN — MUPIROCIN: 20 OINTMENT TOPICAL at 19:57

## 2020-11-17 RX ADMIN — MICONAZOLE NITRATE: 20 POWDER TOPICAL at 19:57

## 2020-11-17 RX ADMIN — METOPROLOL TARTRATE 50 MG: 50 TABLET, FILM COATED ORAL at 08:11

## 2020-11-17 RX ADMIN — ENOXAPARIN SODIUM 30 MG: 30 INJECTION SUBCUTANEOUS at 19:56

## 2020-11-17 RX ADMIN — FAMOTIDINE 20 MG: 20 TABLET, FILM COATED ORAL at 08:17

## 2020-11-17 RX ADMIN — Medication 50 MG: at 08:11

## 2020-11-17 RX ADMIN — MUPIROCIN: 20 OINTMENT TOPICAL at 14:33

## 2020-11-17 RX ADMIN — ATORVASTATIN CALCIUM 20 MG: 20 TABLET, FILM COATED ORAL at 19:56

## 2020-11-17 RX ADMIN — METOPROLOL TARTRATE 50 MG: 50 TABLET, FILM COATED ORAL at 19:56

## 2020-11-17 RX ADMIN — FUROSEMIDE 40 MG: 40 TABLET ORAL at 08:11

## 2020-11-17 RX ADMIN — Medication 1000 UNITS: at 08:11

## 2020-11-17 RX ADMIN — POTASSIUM CHLORIDE 20 MEQ: 1500 TABLET, EXTENDED RELEASE ORAL at 08:17

## 2020-11-17 RX ADMIN — OXYCODONE HYDROCHLORIDE AND ACETAMINOPHEN 1000 MG: 500 TABLET ORAL at 08:11

## 2020-11-17 RX ADMIN — FINASTERIDE 5 MG: 5 TABLET, FILM COATED ORAL at 08:11

## 2020-11-17 RX ADMIN — ENOXAPARIN SODIUM 30 MG: 30 INJECTION SUBCUTANEOUS at 08:17

## 2020-11-17 RX ADMIN — MUPIROCIN: 20 OINTMENT TOPICAL at 08:11

## 2020-11-17 RX ADMIN — MICONAZOLE NITRATE: 20 POWDER TOPICAL at 08:19

## 2020-11-17 RX ADMIN — Medication 1 CAPSULE: at 08:11

## 2020-11-17 RX ADMIN — ASPIRIN 325 MG: 325 TABLET, FILM COATED ORAL at 08:17

## 2020-11-17 ASSESSMENT — PAIN SCALES - GENERAL
PAINLEVEL_OUTOF10: 0
PAINLEVEL_OUTOF10: 0

## 2020-11-17 ASSESSMENT — PAIN - FUNCTIONAL ASSESSMENT: PAIN_FUNCTIONAL_ASSESSMENT: ACTIVITIES ARE NOT PREVENTED

## 2020-11-17 NOTE — PROGRESS NOTES
Comprehensive Nutrition Assessment    Type and Reason for Visit:  Reassess    Nutrition Recommendations/Plan:   Continue current diet and ONS. Consider MVI. Nutrition Assessment:    Pt improving from a nutritional standpoint AEB some meal intake over 25% intake and consuming ONS. Remains at risk for further nutritional compromise r/t continued variable appetite, meal intake less than 75%, admit with covid, advanced age, and underlying medical condition (history of CAD, CHF, skin cancer, CKD, COPD, HLD, HTN, obesity). Nutrition recommendations/interventions as per above. Malnutrition Assessment:  Malnutrition Status:  Insufficient data(COVID)      Estimated Daily Nutrient Needs:  Energy (kcal):  4262-2157- kcal/day (30-32 kcal/kg); Weight Used for Energy Requirements:  (70 kg IBW)     Protein (g):   g/day (1.2-2 g/kg); Weight Used for Protein Requirements:  (70 kg IBW)        Fluid (ml/day):  per MD;      Nutrition Related Findings:  Covid positive. Pt reports appetite \"at times\". Meal intake remains 50% or less. Drinking some of Ensure shakes. He states he is hoping to go home tommorrow. 11/16/20: Glucose 109. Rx: zincate, vitamin C,vitamin D, lasix,senokot, colace, culturelle. BM 11/12/20.       Wounds:  None       Current Nutrition Therapies:    DIET GENERAL;  Dietary Nutrition Supplements: Standard High Calorie Oral Supplement    Anthropometric Measures:  · Height: 5' 8\" (172.7 cm)  · Current Body Weight: 258 lb (117 kg)(11/17/20 trace LE edema)   · Admission Body Weight: 257 lb 4.8 oz (116.7 kg)(11/6; +trace edema BLE)    · Usual Body Weight: (Per EMR: 256 lb 12.8 oz on 11/4/20; 262 lb 3.2 oz on 6/12/20)     · Ideal Body Weight: 154 lbs;   · BMI: 39.2  · BMI Categories: Obese Class 2 (BMI 35.0 -39.9)       Nutrition Diagnosis:   · Inadequate oral intake related to inadequate protein-energy intake as evidenced by poor intake prior to admission      Nutrition Interventions:   Food and/or Nutrient Delivery:  Continue Current Diet, Continue Oral Nutrition Supplement  Nutrition Education/Counseling:  Education initiated(Encouraged oral intake and continued ONS use.)   Coordination of Nutrition Care:  Continue to monitor while inpatient    Goals:  Pt will consume 75% or more of meals during LOS       Nutrition Monitoring and Evaluation:   Behavioral-Environmental Outcomes:  None Identified   Food/Nutrient Intake Outcomes:  Food and Nutrient Intake, Supplement Intake, Vitamin/Mineral Intake  Physical Signs/Symptoms Outcomes:  Biochemical Data, GI Status, Fluid Status or Edema, Weight, Skin     Discharge Planning:     Too soon to determine     Electronically signed by Loyda Mejia RD, LD on 11/17/20 at 3:18 PM EST    Contact: [phone]

## 2020-11-17 NOTE — PROGRESS NOTES
[address] [address]  INPATIENT PHYSICAL THERAPY  Discharge Note  [address]  Time In: 9771  Time Out: 1238  Timed Code Treatment Minutes: 64 Minutes  Minutes: 56          Date: 2020  Patient Name: Paulino Madera,  Gender:  male        MRN: [id number]  : 1934  (80 y.o.)     Referring Practitioner: Attending: Vlad Hairston MD; Ordering: Isha Smith MD  Diagnosis: COVID-19  Additional Pertinent Hx: Per EMR, William Baptiste is a 80 y.o. male who presents to the Emergency Department for the evaluation of progressive weakness, fatigue, cough. Symptoms have been developing over the past 4 to 5 days. \"     Prior Level of Function:  Lives With: Spouse  Type of Home: House  Home Layout: Two level, Performs ADL's on one level, Able to Live on Main level with bedroom/bathroom  Home Access: Stairs to enter with rails  Entrance Stairs - Number of Steps: 1 CHRISTY with wilmer rails  Home Equipment: Rolling walker, transport chair    Restrictions/Precautions:  Restrictions/Precautions: General Precautions, Fall Risk  Position Activity Restriction  Other position/activity restrictions: COVID 19+, -Room air,  2 L02    SUBJECTIVE: Patient in room, agreeable to PT. Pt in chair upon arrival, requesting to use restroom.  Pt on 1L upon arrival    PAIN: no complaints of pain    OBJECTIVE:  Bed Mobility:  Rolling to Left: Stand By Assistance   Rolling to Right: Stand By Assistance   Supine to Sit: Stand By Assistance  Sit to Supine: Stand By Assistance    Increased time to complete, greater than one attempt with supine to sit    Transfers:  Sit to Stand: Stand By Assistance, Contact Guard Assistance  Stand to [address] verbal cues for sequencing    Ambulation:  [address], Stand By Assistance  Distance: 8', 5', 52', 5', 8'  Surface: Level Tile  Device:Rolling Walker  Gait Deviations: Forward Flexed Posture, Slow Nahed, Decreased Step Length Bilaterally, Decreased Gait Speed, Decreased Heel Strike Bilaterally and [employer] of Support  Verbal cues for improved posture     Contact Guard Assistance  Distance: 10'  Surface: Level Tile  Device:Rolling Walker  Gait Deviations: Forward Flexed Posture, Slow Nahed, Decreased Step Length Bilaterally, Decreased Gait Speed and Decreased Heel Strike Bilaterally    Stairs:  Contact Guard Assistance  Number of Steps: 1  Height: 4\" step   Parallel bars  Verbal cues for LE sequencing    Stairs:  Contact Guard Assistance  Number of Steps: 4  Height: 6\" step with Bilateral Handrails   Verbal cues for LE sequencing, non reciprocal pattern    Patient able to  object with reacher with CGA. Functional Outcome Measures: Not completed       ASSESSMENT:  Assessment:   . Mr. Britt Ward met 0/4 STG's and 1/1 LTG's. Patient did not meet goals for transfers, ambulation and stairs due to requiring SBA/CGA for safety. Pt requires occasional verbal cues for safety and 1-2L O2. Despite not meeting all goals, patient has demonstrated small improvements towards goals set for him, completing sit to stand with SBA/CGA and car transfer with CGA. Patient's gait distance has fluctuated depending on fatigue level. Pt requiring supplemental O2 at this time however. Patient is returning to home 11/18/20 per family request.  Family providing 24 hour supervision. Activity Tolerance:  Patient tolerance of  treatment: fair. Rest breaks due to shortness of breath, O2 ranging 87-93% with activity on 1L at rest, 2L with activity, verbal cues for pursed lip breathing. Difficulty obtaining O2 reading at times. Pts O2 92% in bed at end of session. Equipment Recommendations: Other: monitor for needs  Discharge Recommendations:  Home with home health PT and 24 hour supervision    Plan: Home with home health PT 11/18/20, family providing 24 hour

## 2020-11-17 NOTE — PLAN OF CARE
Team Conference held this am. Recommendations of the team were explained to the patient by JOSSELIN and Dr. Chico Titus. Team is recommending that patient continue on TCU however patient and family initiating discharge as they believe patient will make better progress in his home environment. Following discharge team is recommending home with home health, PT, OT, RN and aide services. SW made referral this date with [facility]. Patient will also need a 3 in 1 commode, therapy will order and have item at discharge. Patient to discharge home on Wednesday between 3:30 and 4:00pm. daughter will be transporting him home. SW will continue to monitor progress and maintain contact with patient and patient's family regarding length of stay and recommendations.  SW will continue to assist with ongoing discharge planning

## 2020-11-17 NOTE — PROGRESS NOTES
[address] [address]  INPATIENT SPEECH THERAPY  - [facility]  DISCHARGE NOTE    TIME   SLP Individual Minutes  Time In: 1500  Time Out: [address]  Minutes: 25  Timed Code Treatment Minutes: 25 Minutes       Date: 2020  Patient Name: Thu Pinto      CSN: [id number]   : 1934  (80 y.o.)  Gender: male   Referring Physician: Bebo Guan MD   Diagnosis: COVID-19   Secondary Diagnosis: Cognitive deficits   Precautions: Fall risk, contact isolation/droplet precautions   Current Diet: Regular diet and thin liquids   Swallowing Strategies: Standard Universal Swallow Precautions  Date of Last MBS: Not Applicable    Pain:  No pain reported. Subjective:  Pt resting in bed upon arrival, awake with fatigue noted. Usage of Pocket Talker to amplify hearing acuity levels. Positive participation throughout skilled interventions. Anticipated transition to home environment on 2020 with / supervision. In attempts to measure progress obtained within this therapy course/hospitalization, re-administered informal cognitive assessment of MOCA with the following findings:    [facility]resencio Cognitive Assessment [facility] ([place], [place])) version 7.2 completed. Pt scored 15/30. Normal is greater than or equal to 26/30.   *Inclusion of +1 point given highest level of education achieved less than/equal to 12th grade or GED with limited-0 post-secondary schooling   **Prior MOCA score of 20/30 upon admission to [address] [place] [address] on 2020; decline of -5 points  Orientation: 4/6  Immediate Recall: 5/5  Short-Term Recall: 3/5  Divergent Namin items named in 1 minute time frame (target=11)  Problem Solvin/3  Reasonin/2 verbal, 0/2 visuospatial  Sequencin/1  Thought Organization: Impaired  Insight: Poor; limited understanding of pathological deficits presenting and functional deficits presenting  Attention: Impaired sustained and selective attention   Math Computation: 1/3 serial subtraction  Executive Functionin/3 clock drawing    Pt continues to present with a moderate cognitive deficit, in which overall cognitive performance notes to be HIGHLY variable, certainly compounded d/t fatigue levels presenting. Skilled education to follow completion of informal assessment to assist with safety measures in home environment. The following instruction was provided:  -Medication management: direct assistance from familial members for completion of IADL routine given notable deficits for organizational thought and recall; encouragement provided to generate an updated medication list upon return to home setting to assist with safety measures in home/commmunity settings  -Financial management: upon social hx questioning during admission, pt reported independent completion of financial management in home environment; at this time, d/t deficits for executive functioning, recommend direct assistance from familial members for completion of IADL  -Driving: pt with LIMITED awareness to pathological deficits presenting, stating, \"I can get back to driving tomorrow when I go home\"; DIRECT education provided re: NO return to driving until f/u is completed with PCP as well as completion of OP driving simulation  -Home management: 24/7 supervision is HIGHLY encouraged at this time     Short-Term Goals:  SHORT TERM GOAL #1:  Goal 1: Pt will complete functional immediate/delayed recall (focus on compensatory strategies) tasks with 80% accuracy given mod cues to improve retention of pertinent information.  GOAL NOT MET  INTERVENTIONS: Orientation:   -Month: independent  -Year: independent  -Place: independent  -Name of facility: max cues  -City: independent  -DAVON: logical cues  -Date: independent  -Time: independent    Staff relations  ST name/department title: 1/2 independent, 1/2 with referral to care board    SHORT TERM GOAL #2:  Goal 2: Pt will complete problem solving, sequential analysis, and verbal/visual reasoning (time, money/math/finances, Needs further instruction and Family not present    ASSESSMENT/PLAN:  SUMMARY: For this therapy reporting period, 0/4 STGs and 0/1 LTG achieved; limited goal attainment s/t recently completed progress note with higher level goals introduced. Overall gains obtained within this therapy course r/t functional recall skills (temporal and spatial orientation, staff relations, 3-4 units) as well as problem solving and reasoning for home scenarios. Notable deficits however continue for mental flexibility and organizational thought, concerning for potential return to management of IADLs (medications, basic finances, household obligations, etc) with direct familial assistance to be required. Moderately complex attention skills yet to be targeted, albeit, NO return to driving until physician clearance and/or OP driving simulation is completed. Further progress is foreseen with continuation of services, no anticipated barriers at this time. Cognitive performance notes to be HIGHLY variable, dependent upon fatigue level. D/c skilled ST services d/t anticipated transfer to home environment with support of family to provide 24/7 supervision/assistance. [facility] services is highly recommended to facilitate re-integration measures and to continue targeting deficits presenting. Activity Tolerance:  Patient tolerance of  treatment: good. Appropriate engagement and participation within session      Assessment/Plan: Patient discharged from Speech Therapy at this time due to anticipated transition to home environment. Discharge Disposition: home with family. Continued Speech Therapy Services recommended: Yes, HH.             Rio Leon M.A., [address] [place] [address]

## 2020-11-17 NOTE — PROGRESS NOTES
Date: 2020  Patient Name: Nahid Jacob        MRN: [id number]   Account: [de-identified]   : 1934  (80 y.o.)  Gender: male   Referring Practitioner: Ordering: Selvin Milian MD Attending: Dr. Maynor Patino  Diagnosis: Covid 19  Additional Pertinent Hx: Per EMR, Monserrat Durán is a 80 y.o. male who presents to the Emergency Department for the evaluation of progressive weakness, fatigue, cough. Symptoms have been developing over the past 4 to 5 days. \" Pt admitted to TCU on 20. Nahid Jacob requires a Bedside Commode to complete bathing, toileting, dressing and grooming tasks. Patient requires a Bedside Commode due to Upper Extremity/Lower Extremity Weakness and limited ambulation and is unable to walk to the bathroom at home. Without the Bedside Commode, Nahid Jacob is at increased risk for falls and would require increased assistance for ADL's and mobility.

## 2020-11-17 NOTE — PROGRESS NOTES
[facility]  Shannan Butt 894 UNIT  Occupational Therapy  Discharge Note  Time:   Time In: 815  Time Out: 915  Timed Code Treatment Minutes: 61 Minutes  Minutes: 60    Date: 2020  Patient Name: Dina Serrano,   Gender: male      Room: [facility]/059-A  MRN: [id number]  : 1934  (80 y.o.)  Referring Practitioner: Ordering: Juli Saez MD Attending: Dr. Ramses Styles  Diagnosis: Covid 19  Additional Pertinent Hx: Per EMR, Kaity Iqbal is a 80 y.o. male who presents to the Emergency Department for the evaluation of progressive weakness, fatigue, cough. Symptoms have been developing over the past 4 to 5 days. \" Pt admitted to TCU on 20. Restrictions/Precautions:  Restrictions/Precautions: General Precautions, Fall Risk  Position Activity Restriction  Other position/activity restrictions: COVID 19+, -Room air,  2 L02    SUBJECTIVE: Upon arrival patient was sitting up in bed. Pt was agreeable to OT session. PAIN: Denies    COGNITION: Slow Processing, Decreased Problem Solving and Decreased Safety Awareness    ADL:     EATING:Setup or clean-up assistance. Rosa Dye CARE Score: 5. ORAL HYGIENE:Supervision or touching assistance. Rosa Dye CARE Score: 4. TOILETING HYGIENE:Substantial/maximal assistance. Rosa Dye CARE Score: 2. SHOWERING/BATHING:Partial/moderate assistance. Rosa Dye CARE Score: 3.   - Assist to wash B feet, B legs, and tashi area while in shower. UPPER BODY DRESSING:Supervision or touching assistance. Rosa Dye CARE Score: 4. LOWER BODY DRESSING:Partial/moderate assistance. Rosa Dye CARE Score: 3. FOOTWEAR:Dependent   . CARE Score: 1.     TOILET TRANSFER: Supervision or touching assistance. Rosa Dye CARE Score: 4.        *Increased time to all self cares this date d/t decreased endurance. O2 stats remained above 90% on 3 liters O2 throughout session. Pt required moderate vcs for safety in shower. BALANCE:  Sitting Balance:  Stand By Assistance.     Standing Balance: Contact Guard Assistance. BED MOBILITY:  Supine to Sit: Stand By Assistance, with head of bed raised, with increased time for completion    Scooting: Contact Guard Assistance, with increased time for completion      TRANSFERS:  Sit to Stand:  [employer], with increased time for completion, cues for hand placement. Stand to Sit: [employer], cues for hand placement. FUNCTIONAL MOBILITY:  Assistive Device: Rolling Walker  Assist Level:  Contact Guard Assistance. Distance: To and from shower room  Slow pace, wide CARSON, forward flexed posture     ASSESSMENT:  Activity Tolerance:  Patient tolerance of  treatment: good. Assessment: Assessment: Pt is making slow progress towards goals. Pt has met 1/4 STGs and 0/2 LTGs. Pt has exhibited improvement in transfer ability, UB strength. Pt continues to exhibit decreased dynamic standing balance, endurance, safety awareness, and activity tolerance causing barriers to meeting pt's goals. Pt continues to require Mod A with all LB dressing and showering tasks. Per family request, pt is discharging home with 24/7 S and Assist from [facility] and [facility]. Pt would have benefited from further stay on TCU.      Discharge Recommendations: Home with Home health OT,     Equipment Recommendations: Equipment Needed: Yes  Other: BSC ordered 11/17/20    Plan: Home with 24/7 S & Assist     Patient Education  Patient Education: Plan of Care, ADL's, Home Safety, Importance of Increasing Activity, Assistive Device Safety, Education Related to [employer] and Wellness and Home Safety Education    Goals  Short term goals  Time Frame for Short term goals: 2 weeks  Short term goal 1: Pt will complete BUE light resistive exercises with min vcs for safety to increase indep and safety with all self cares and transfers. (MET)   Short term goal 2: Pt will complete standing tolerance x3 minutes with 1-2 UE and SBA release to increase indep and safety with

## 2020-11-18 VITALS
RESPIRATION RATE: 24 BRPM | SYSTOLIC BLOOD PRESSURE: 133 MMHG | OXYGEN SATURATION: 90 % | HEIGHT: 68 IN | HEART RATE: 88 BPM | WEIGHT: 258 LBS | TEMPERATURE: 97.9 F | DIASTOLIC BLOOD PRESSURE: 63 MMHG | BODY MASS INDEX: 39.1 KG/M2

## 2020-11-18 PROBLEM — R33.8 ACUTE URINARY RETENTION: Status: ACTIVE | Noted: 2020-11-18

## 2020-11-18 PROBLEM — U07.1 PNEUMONIA DUE TO COVID-19 VIRUS: Status: ACTIVE | Noted: 2020-11-18

## 2020-11-18 PROBLEM — J12.82 PNEUMONIA DUE TO COVID-19 VIRUS: Status: ACTIVE | Noted: 2020-11-18

## 2020-11-18 PROCEDURE — 6370000000 HC RX 637 (ALT 250 FOR IP): Performed by: INTERNAL MEDICINE

## 2020-11-18 PROCEDURE — 6370000000 HC RX 637 (ALT 250 FOR IP): Performed by: FAMILY MEDICINE

## 2020-11-18 PROCEDURE — 6360000002 HC RX W HCPCS: Performed by: INTERNAL MEDICINE

## 2020-11-18 PROCEDURE — 94761 N-INVAS EAR/PLS OXIMETRY MLT: CPT

## 2020-11-18 RX ORDER — ASPIRIN 325 MG
325 TABLET ORAL DAILY
COMMUNITY
Start: 2020-11-18

## 2020-11-18 RX ORDER — ZINC SULFATE 50(220)MG
50 CAPSULE ORAL DAILY
COMMUNITY
Start: 2020-11-18

## 2020-11-18 RX ADMIN — OXYCODONE HYDROCHLORIDE AND ACETAMINOPHEN 1000 MG: 500 TABLET ORAL at 09:52

## 2020-11-18 RX ADMIN — POTASSIUM CHLORIDE 20 MEQ: 1500 TABLET, EXTENDED RELEASE ORAL at 09:49

## 2020-11-18 RX ADMIN — METOPROLOL TARTRATE 50 MG: 50 TABLET, FILM COATED ORAL at 09:49

## 2020-11-18 RX ADMIN — MUPIROCIN: 20 OINTMENT TOPICAL at 14:00

## 2020-11-18 RX ADMIN — POLYETHYLENE GLYCOL 3350 17 G: 17 POWDER, FOR SOLUTION ORAL at 05:46

## 2020-11-18 RX ADMIN — ENOXAPARIN SODIUM 30 MG: 30 INJECTION SUBCUTANEOUS at 09:52

## 2020-11-18 RX ADMIN — STANDARDIZED SENNA CONCENTRATE 8.6 MG: 8.6 TABLET ORAL at 09:52

## 2020-11-18 RX ADMIN — Medication 50 MG: at 09:52

## 2020-11-18 RX ADMIN — MICONAZOLE NITRATE: 20 POWDER TOPICAL at 09:49

## 2020-11-18 RX ADMIN — MUPIROCIN: 20 OINTMENT TOPICAL at 09:49

## 2020-11-18 RX ADMIN — FUROSEMIDE 40 MG: 40 TABLET ORAL at 09:52

## 2020-11-18 RX ADMIN — FAMOTIDINE 20 MG: 20 TABLET, FILM COATED ORAL at 09:49

## 2020-11-18 RX ADMIN — FINASTERIDE 5 MG: 5 TABLET, FILM COATED ORAL at 09:52

## 2020-11-18 RX ADMIN — Medication 1 CAPSULE: at 09:52

## 2020-11-18 RX ADMIN — ASPIRIN 325 MG: 325 TABLET, FILM COATED ORAL at 09:48

## 2020-11-18 RX ADMIN — Medication 1000 UNITS: at 09:52

## 2020-11-18 ASSESSMENT — PAIN SCALES - GENERAL: PAINLEVEL_OUTOF10: 0

## 2020-11-18 NOTE — PROGRESS NOTES
Patient discharged in stable condition as per order of attending physician [facility] Time: [phone] to car. AVS provided by RN at time of discharge, which includes all necessary medical information pertaining to the patients current course of illness, treatment, medications, post-discharge goals of care, and treatment preferences. Patient/ family verbalize understanding of discharge plan and are in agreement with goal/plan/treatment preferences. Belongings including Glasses, Dentures upper and lower sent with patient. Home medications sent home with patient N/A    Availability of \"My Chart\" offered to patient as a tool for updated health record.   Steps for activation discussed with patient as mentioned on AVS.

## 2020-11-18 NOTE — PLAN OF CARE
Problem: Airway Clearance - Ineffective  Goal: Achieve or maintain patent airway  11/18/2020 0134 by Torrey Lacey RN  Outcome: Ongoing     Problem: Gas Exchange - Impaired  Goal: Absence of hypoxia  11/18/2020 0134 by Torrey Lacey RN  Outcome: Ongoing     Problem: Gas Exchange - Impaired  Goal: Promote optimal lung function  11/18/2020 0134 by Torrey Lacey RN  Outcome: Ongoing     Problem: Breathing Pattern - Ineffective  Goal: Ability to achieve and maintain a regular respiratory rate  11/18/2020 0134 by Torrey Lacey RN  Outcome: Ongoing     Problem: Body Temperature -  Risk of, Imbalanced  Goal: Ability to maintain a body temperature within defined limits  11/18/2020 0134 by Torrey Lacey RN  Outcome: Ongoing     Problem: Body Temperature -  Risk of, Imbalanced  Goal: Will regain or maintain usual level of consciousness  11/18/2020 0134 by Torrey Lacey RN  Outcome: Ongoing     Problem:  Body Temperature -  Risk of, Imbalanced  Goal: Complications related to the disease process, condition or treatment will be avoided or minimized  11/18/2020 0134 by Torrey Lacey RN  Outcome: Ongoing     Problem: Nutrition Deficits  Goal: Optimize nutrtional status  11/18/2020 0134 by Torrey Lacey RN  Outcome: Ongoing     Problem: Risk for Fluid Volume Deficit  Goal: Maintain normal heart rhythm  11/18/2020 0134 by Torrey Lacey RN  Outcome: Ongoing     Problem: Risk for Fluid Volume Deficit  Goal: Maintain absence of muscle cramping  11/18/2020 0134 by Torrey Lacey RN  Outcome: Ongoing     Problem: Risk for Fluid Volume Deficit  Goal: Maintain normal serum potassium, sodium, calcium, phosphorus, and pH  11/18/2020 0134 by Torrey Lacey RN  Outcome: Ongoing     Problem: Loneliness or Risk for Loneliness  Goal: Demonstrate positive use of time alone when socialization is not possible  11/18/2020 0134 by Torrey Lacey RN  Outcome: Patients continuum of care needs are met  11/18/2020 0134 by Tavo Cabezas RN  Outcome: Ongoing     . Problem: SAFETY  Goal: LTG - Patient will demonstrate safety requirements appropriate to situation/environment  11/18/2020 0134 by Tavo Cabezas RN  Outcome: Ongoing     Problem: IP COMMUNICATION/DYSARTHRIA  Goal: LTG - Patient will effectively communicate in all situations with the use of compensatory strategies  11/18/2020 0134 by Tavo Cabezas RN  Outcome: Ongoing     Problem: Nutrition  Goal: Optimal nutrition therapy  11/18/2020 0134 by Tavo Cabezas RN  Outcome: Ongoing   Care plan reviewed with patient. Patient verbalizes understanding of care plan and treatment goals.

## 2020-11-18 NOTE — PLAN OF CARE
11/18/20, 10:38 AM EST    Patient goals/plan/ treatment preferences discussed by Saima Romero /. Patient will be going home with requested [facility], PT,OT RN,Aide services. Patient goals/plan/ treatment preferences reviewed with patient/ family. Patient/ family verbalize understanding of discharge plan and are in agreement with goal/plan/treatment preferences. Understanding was demonstrated using the teach back method. AVS provided by RN at time of discharge, which includes all necessary medical information pertaining to the patients current course of illness, treatment, post-discharge goals of care, and treatment preferences. IMM Letter  Notice of Medicare Non-Coverage Letter Not Given?  (Post Acute): Patient Initiated Discharge

## 2020-11-18 NOTE — DISCHARGE INSTR - COC
Continuity of Care Form    Patient Name: Juliocesar Alvarez   :  1934  MRN:  [id number]    Admit date:  2020  Discharge date:  2020    Code Status Order: Full Code   Advance Directives:   [facility] Documentation     Date/Time Healthcare Directive Type of Healthcare Directive Copy in [address] Agent's Name Healthcare Agent's Phone Number    [phone]  Yes, patient has an advance directive for healthcare treatment -- -- -- -- --          Admitting Physician:  Sotero Kaur MD  PCP: PEPITO Schafer - CNP    Discharging Nurse: [facility] Unit/Room#: 10E-63/65-A  Discharging Unit Phone Number: [phone]    Emergency Contact:   Extended Emergency Contact Information  Primary Emergency Contact: [address] Phone: [phone]  Mobile Phone: [phone]  Relation: Child  Secondary Emergency Contact: [address] [place] [address] Phone: [phone]  Relation: Child    Past Surgical History:  Past Surgical History:   Procedure Laterality Date    CARDIAC SURGERY  2016    triple by pass    CYSTOSCOPY  2017    Right Ureteroscopy, Basket Retrieval of Stones, Stent - Dr. Jasmine Estrada  ??    bilateral cataracts    MOHS SURGERY Left 2018    Mohs Repair BCC Left [place]     IA OUTER EAR SURGERY PROC UNLISTED Left 2018    MOHS REPAIR BCC LEFT EARLOBE performed by Ayla Saez MD at [facility]         Immunization History:   Immunization History   Administered Date(s) Administered    Influenza 09/10/2013    Influenza Vaccine, unspecified formulation 10/19/2016    Influenza, High Dose (Fluzone 65 yrs and older) 10/08/2015, 10/10/2016    Influenza, Triv, inactivated, subunit, adjuvanted, IM (Fluad 65 yrs and older) 2018    PPD Test 2016, 2016, 2020, 2020    Pneumococcal Indicated By Review Planned Expiration Resolved Resolved By    None active    Resolved    COVID-19 11/04/20 11/04/20 11/04/20 COVID-19   11/12/20 Tanisha Gray RN    S/S 11/1    COVID-19 Rule Out 11/04/20 11/04/20 11/04/20 COVID-19 (Ordered)   11/04/20 Rule-Out Test Resulted          Nurse Assessment:  Last Vital Signs: /60   Pulse 85   Temp 98 °F (36.7 °C) (Oral)   Resp 30   Ht 5' 8\" (1.727 m)   Wt 258 lb (117 kg)   SpO2 90%   BMI 39.23 kg/m²     Last documented pain score (0-10 scale): Pain Level: 0  Last Weight:   Wt Readings from Last 1 Encounters:   11/17/20 258 lb (117 kg)     Mental Status:  oriented, alert and coherent    IV Access:  - None    Nursing Mobility/ADLs:  Walking   Assisted  Transfer  Assisted  Bathing  Assisted  Dressing  Assisted  Toileting  Assisted  Feeding  Independent  Med Admin  Independent  Med Delivery   whole    Wound Care Documentation and Therapy:        Elimination:  Continence:   · Bowel: No  · Bladder: No  Urinary Catheter: Insertion Date: 11/17/2020   Colostomy/Ileostomy/Ileal Conduit: No       Date of Last BM: 11/18/20    Intake/Output Summary (Last 24 hours) at 11/18/2020 0739  Last data filed at 11/18/2020 0521  Gross per 24 hour   Intake 200 ml   Output 1030 ml   Net -830 ml     I/O last 3 completed shifts: In: 200 [P.O.:200]  Out: 36 [Urine:1130]    Safety Concerns: At Risk for Falls    Impairments/Disabilities:      None    Nutrition Therapy:  Current Nutrition Therapy:   - Oral Diet:  General    Routes of Feeding: Oral  Liquids: Thin Liquids  Daily Fluid Restriction: no  Last Modified Barium Swallow with Video (Video Swallowing Test): not done    Treatments at the Time of Hospital Discharge:   Respiratory Treatments: NA  Oxygen Therapy:  is on oxygen at 2 L/min per nasal cannula.   Ventilator:    - CPAP   only when sleeping    Rehab Therapies: Physical Therapy, Occupational Therapy and Speech/Language Therapy  Weight Bearing Status/Restrictions: No weight bearing restirctions  Other Medical Equipment (for information only, NOT a DME order):  walker  Other Treatments: NA    Patient's personal belongings (please select all that are sent with patient):  Lucila    RN SIGNATURE:  {Esignature:[id number]}    CASE MANAGEMENT/SOCIAL WORK SECTION    Inpatient Status Date: ***    Readmission Risk Assessment Score:  Readmission Risk              Risk of Unplanned Readmission:        14           Discharging to Facility/ Agency   · Name:   · Address:  · Phone:  · Fax:    Dialysis Facility (if applicable)   · Name:  · Address:  · Dialysis Schedule:  · Phone:  · Fax:    / signature: {Esignature:[id number]}    PHYSICIAN SECTION    Prognosis: {Prognosis:0722922539}    Condition at Discharge: [address] [place] [address] Patient Condition:963427269}    Rehab Potential (if transferring to Rehab): {Prognosis:2571524847}    Recommended Labs or Other Treatments After Discharge: ***    Physician Certification: I certify the above information and transfer of Roxana Thibodeaux  is necessary for the continuing treatment of the diagnosis listed and that he requires {Admit to Appropriate Level of Care:94678} for {GREATER/LESS:755176256} 30 days.      Update Admission H&P: {CHP DME Changes in DWKFQ:981310321}    PHYSICIAN SIGNATURE:  {Esignature:[id number]}

## 2020-11-18 NOTE — FLOWSHEET NOTE
Pt was contacted by phone for prayer, support and encouragement. His daughter was present. 11/18/20 1617   Encounter Summary   Services provided to: Patient and family together   Referral/Consult From: [address] Visiting Yes  (11/18 P F)   Complexity of Encounter Low   Length of Encounter 15 minutes   Routine   Type Follow up   Assessment Approachable;Calm   Intervention Niles;Prayer;Nurtured hope   Outcome Acceptance;Expressed gratitude;Encouraged; Hopeful

## 2020-11-18 NOTE — PLAN OF CARE
Problem: Airway Clearance - Ineffective  Goal: Achieve or maintain patent airway  Outcome: Completed     Problem: Gas Exchange - Impaired  Goal: Absence of hypoxia  Outcome: Completed  Goal: Promote optimal lung function  Outcome: Completed     Problem: Breathing Pattern - Ineffective  Goal: Ability to achieve and maintain a regular respiratory rate  Outcome: Completed     Problem:  Body Temperature -  Risk of, Imbalanced  Goal: Ability to maintain a body temperature within defined limits  Outcome: Completed  Goal: Will regain or maintain usual level of consciousness  Outcome: Completed  Goal: Complications related to the disease process, condition or treatment will be avoided or minimized  Outcome: Completed     Problem: Nutrition Deficits  Goal: Optimize nutrtional status  Outcome: Completed     Problem: Risk for Fluid Volume Deficit  Goal: Maintain normal heart rhythm  Outcome: Completed  Goal: Maintain absence of muscle cramping  Outcome: Completed  Goal: Maintain normal serum potassium, sodium, calcium, phosphorus, and pH  Outcome: Completed     Problem: Loneliness or Risk for Loneliness  Goal: Demonstrate positive use of time alone when socialization is not possible  Outcome: Completed     Problem: Fatigue  Goal: Verbalize increase energy and improved vitality  Outcome: Completed     Problem: Patient Education: Go to Patient Education Activity  Goal: Patient/Family Education  Outcome: Completed     Problem: Falls - Risk of:  Goal: Will remain free from falls  Description: Will remain free from falls  Outcome: Completed  Goal: Absence of physical injury  Description: Absence of physical injury  Outcome: Completed     Problem: Skin Integrity:  Goal: Will show no infection signs and symptoms  Description: Will show no infection signs and symptoms  Outcome: Completed  Goal: Absence of new skin breakdown  Description: Absence of new skin breakdown  Outcome: Completed     Problem: Bleeding:  Goal: Will show no signs and symptoms of excessive bleeding  Description: Will show no signs and symptoms of excessive bleeding  Outcome: Completed     Problem: Pain:  Goal: Pain level will decrease  Description: Pain level will decrease  Outcome: Completed  Goal: Control of acute pain  Description: Control of acute pain  Outcome: Completed  Goal: Control of chronic pain  Description: Control of chronic pain  Outcome: Completed     Problem: Urinary Elimination:  Goal: Skin integrity will improve  Description: Skin integrity will improve  Outcome: Completed     Problem: Discharge Planning:  Goal: Patients continuum of care needs are met  Description: Patients continuum of care needs are met  11/18/2020 1553 by Darci Prather LPN  Outcome: Completed  11/18/2020 1038 by ANN Rinaldi  Outcome: Ongoing     Problem: SAFETY  Goal: LTG - Patient will demonstrate safety requirements appropriate to situation/environment  Outcome: Completed     Problem: IP COMMUNICATION/DYSARTHRIA  Goal: LTG - Patient will effectively communicate in all situations with the use of compensatory strategies  Outcome: Completed     Problem: Nutrition  Goal: Optimal nutrition therapy  Outcome: Completed

## 2020-11-18 NOTE — PROGRESS NOTES
A home oxygen evaluation has been completed. [x]Patient is an inpatient. It is expected that the patient will be discharged within the next 48 hours. Qualified provider to write order for home prescription if patient qualifies. Social service/care managers will arrange for home oxygen. If patient is active, arrange for Home Medical supplier to assess for Oxygen Conserving Device per pulse oximetry. []Patient is an outpatient. Results will be faxed to the ordering provider. Qualified provider to write order for home prescription if patient qualifies and arranges for home oxygen. Patient was placed on room air for 15 minutes. SpO2 was 88 % on room air at rest. Patients SpO2 was below 89% and qualified for home oxygen. Oxygen was applied at 1 lpm via nasal cannula to maintain a SpO2 between 90-92% while at rest. Actual SpO2 was 92 %. Patient can ambulate for exercise flow rate. Patients was ambulated, SpO2 was 90% on 4 lpm to maintain SpO2 between 90-92% while exercising.

## 2021-01-13 RX ORDER — METOPROLOL TARTRATE 50 MG/1
50 TABLET, FILM COATED ORAL 2 TIMES DAILY
Qty: 180 TABLET | Refills: 3 | Status: SHIPPED | OUTPATIENT
Start: 2021-01-13 | End: 2022-02-15 | Stop reason: SDUPTHER

## 2021-03-15 ENCOUNTER — TELEPHONE (OUTPATIENT)
Dept: CARDIOLOGY CLINIC | Age: 86
End: 2021-03-15

## 2021-03-15 NOTE — TELEPHONE ENCOUNTER
PCP called saying pt was in and is having some JENNIFER and open blisters which was treated with ATB, they did ref pt to vein care center   Asking if you want anything else?

## 2021-03-16 ENCOUNTER — TELEPHONE (OUTPATIENT)
Dept: CARDIOLOGY CLINIC | Age: 86
End: 2021-03-16

## 2021-03-16 NOTE — TELEPHONE ENCOUNTER
Spoke with Roxanne Shen and advised of Dr Rakesh Baptiste recommendation of [address] Voiced understanding.   Will keep appt 4/21/21

## 2021-03-16 NOTE — TELEPHONE ENCOUNTER
Rajiv Real, a nurse from [address] [place] [address] (PCP office) called. Pt is scheduled with Dr Lee Ann Barriga on 4/16, but Dr Esther Oakes would like him seen sooner. Walter's peripheral vascular disease and foot ulceration are worsening. Call Rajiv Real with the appt at [phone].  OK to leave a message with any other employee if she's not available

## 2021-04-16 ENCOUNTER — OFFICE VISIT (OUTPATIENT)
Dept: CARDIOLOGY CLINIC | Age: 86
End: 2021-04-16
Payer: MEDICARE

## 2021-04-16 VITALS
DIASTOLIC BLOOD PRESSURE: 72 MMHG | HEART RATE: 60 BPM | HEIGHT: 69 IN | BODY MASS INDEX: 37.03 KG/M2 | WEIGHT: 250 LBS | SYSTOLIC BLOOD PRESSURE: 138 MMHG

## 2021-04-16 DIAGNOSIS — I25.10 CORONARY ARTERY DISEASE DUE TO LIPID RICH PLAQUE: Primary | ICD-10-CM

## 2021-04-16 DIAGNOSIS — I25.83 CORONARY ARTERY DISEASE DUE TO LIPID RICH PLAQUE: Primary | ICD-10-CM

## 2021-04-16 PROCEDURE — G8417 CALC BMI ABV UP PARAM F/U: HCPCS | Performed by: INTERNAL MEDICINE

## 2021-04-16 PROCEDURE — 99213 OFFICE O/P EST LOW 20 MIN: CPT | Performed by: INTERNAL MEDICINE

## 2021-04-16 PROCEDURE — 1036F TOBACCO NON-USER: CPT | Performed by: INTERNAL MEDICINE

## 2021-04-16 PROCEDURE — G8427 DOCREV CUR MEDS BY ELIG CLIN: HCPCS | Performed by: INTERNAL MEDICINE

## 2021-04-16 PROCEDURE — 1123F ACP DISCUSS/DSCN MKR DOCD: CPT | Performed by: INTERNAL MEDICINE

## 2021-04-16 PROCEDURE — 4040F PNEUMOC VAC/ADMIN/RCVD: CPT | Performed by: INTERNAL MEDICINE

## 2021-04-16 NOTE — PROGRESS NOTES
[address] [place] [address],[place] [address] Anshul Kovacs [facility] [address]K  [facility] [address]  Dept: [phone]  Dept Fax: [phone]  Loc: [phone]      Visit Date: 4/16/2021    Mr. Ann Marie Tyson is a 80 y.o. male  who presented for:  Chief Complaint   Patient presents with    Follow-up     10 month checkup        HPI:   79 yo M c hx of CAD s/p CABG x3, 2016, hx MI,COPD, HTN, HLD, CHF, TIA is here for a follow up. Denies any chest pain, palpitations, SOB, lightheadedness, dizziness, orthopnea, PND, leg swelling, leg pain.       Current Outpatient Medications:     metoprolol tartrate (LOPRESSOR) 50 MG tablet, Take 1 tablet by mouth 2 times daily, Disp: 180 tablet, Rfl: 3    aspirin 325 MG tablet, Take 1 tablet by mouth daily, Disp: , Rfl:     zinc sulfate (ZINCATE) 220 (50 Zn) MG capsule, Take 1 capsule by mouth daily, Disp: , Rfl:     vitamin C (VITAMIN C) 1000 MG tablet, Take 1 tablet by mouth daily, Disp: , Rfl:     potassium chloride (KLOR-CON M) 20 MEQ TBCR extended release tablet, Take 20 mEq by mouth, Disp: , Rfl:     Lactobacillus (ACIDOPHILUS/PECTIN PO), Take by mouth, Disp: , Rfl:     Multiple Vitamins-Minerals (THERAPEUTIC MULTIVITAMIN-MINERALS) tablet, Take 1 tablet by mouth daily, Disp: , Rfl:     Cholecalciferol (VITAMIN D PO), Take 1,000 Units by mouth 2 times daily Vit D3, Disp: , Rfl:     furosemide (LASIX) 40 MG tablet, Take 1 tablet by mouth daily, Disp: 30 tablet, Rfl: 3    atorvastatin (LIPITOR) 20 MG tablet, Take 1 tablet by mouth nightly, Disp: 30 tablet, Rfl: 3    finasteride (PROSCAR) 5 MG tablet, Take 5 mg by mouth daily, Disp: , Rfl:     Past Medical History  Laurence Hogan  has a past medical history of Acute on chronic diastolic CHF (congestive heart failure), NYHA class 2 (HCC), Arthritis, Atrial flutter (HCC), BPH (benign prostatic hyperplasia), CAD (coronary artery disease), Cancer ([facility] [address] CKD (chronic kidney disease) stage 3, GFR 30-59 ml/min, COPD with exacerbation ([facility] [address] Hyperlipidemia, Hypertension, NSTEMI (non-ST elevated myocardial infarction) ([facility] [address] Obesity (BMI 35.0-39.9 without comorbidity), ARGENIS (obstructive sleep apnea), S/P CABG x 3, S/P cardiac cath: 6/20/2016: Tight LM lesion estimated at 90% along with the ostium of LAD. The ostium of the LCX seems to be significantly narrowed as well. 90% mid-RCA and 100% distal RCA, and TIA (transient ischemic attack). Social History  Thais Patel  reports that he quit smoking about 71 years ago. His smoking use included cigarettes. He started smoking about 74 years ago. He has a 7.50 pack-year smoking history. He has never used smokeless tobacco. He reports current alcohol use of about 2.0 standard drinks of alcohol per week. He reports that he does not use drugs. Family History  Thais Patel family history includes Cancer in his father. Past Surgical History   Past Surgical History:   Procedure Laterality Date    CARDIAC SURGERY  06/2016    triple by pass    CYSTOSCOPY  04/20/2017    Right Ureteroscopy, Basket Retrieval of Stones, Stent - Dr. Martha Saldaña  2009??    bilateral cataracts    MOHS SURGERY Left 01/26/2018    Mohs Repair BCC Left [place]     OH OUTER EAR SURGERY PROC UNLISTED Left 1/26/2018    MOHS REPAIR BCC LEFT EARLOBE performed by Pranav Vera MD at [facility]         Subjective:     REVIEW OF SYSTEMS  Constitutional: denies sweats, chills and fever  HENT: denies  congestion, sinus pressure, sneezing and sore throat. Eyes: denies  pain, discharge, redness and itching. Respiratory: denies apnea, cough  Gastrointestinal: denies blood in stool, constipation, diarrhea   Endocrine: denies cold intolerance, heat intolerance, polydipsia. Genitourinary: denies dysuria, enuresis, flank pain and hematuria. Musculoskeletal: denies arthralgias, joint swelling and neck pain. Neurological: denies numbness and headaches.    Psychiatric/Behavioral: denies 21 11/05/2020    AST 33 11/05/2020    PROT 6.3 11/05/2020    BILITOT 0.3 11/05/2020    BILIDIR <0.2 03/19/2017    LABALBU 2.8 11/05/2020       Lab Results   Component Value Date    MG 2.1 11/04/2020       Lab Results   Component Value Date    INR 1.09 11/05/2020    INR 0.98 06/01/2017    INR 1.52 (H) 03/20/2017         Lab Results   Component Value Date    LABA1C 5.1 06/15/2016       Lab Results   Component Value Date    TRIG 77 06/20/2016    HDL 48 06/20/2016    LDLCALC 34 06/20/2016       Lab Results   Component Value Date    TSH 2.340 11/04/2020         Testing Reviewed:      I have individually reviewed the below cardiac tests    EKG:     ECHO:   Results for orders placed during the hospital encounter of 04/19/19   ECHO Complete 2D W Doppler W Color    Narrative Transthoracic Echocardiography Report (TTE)     Demographics      Patient Name    Emily Estrada Gender                Male      MR #            [id number]       Race                                                        Ethnicity      Account #       [de-identified]       Room Number      Accession       [id number]       Date of Study         04/19/2019   Number      Date of Birth   1934      Referring Physician   Julissa Jenkins, CNP      Age             80 year(s)      Christina Alexandre, [facility]                                      Interpreting          Mery Franco MD                                   Physician     Procedure    Type of Study      TTE procedure:ECHOCARDIOGRAM COMPLETE 2D W DOPPLER W COLOR. Procedure Date  Date: 04/19/2019 Start: 10:42 AM    Study Location: Echo Lab  Technical Quality: Limited visualization due to body habitus. Indications:Cardiomyopathy.     Additional Medical History:Chronic kidney disease, NSTEMI, Obstructive sleep  apnea, Coronary artery disease, CABG, Transient ischemic attack,  Hypertension, Hyperlipidemia, Aflutter, Congestive heart failure    Patient Status: Routine    Height: 68 inches Weight: 258.01 pounds BSA: 2.28 m^2 BMI: 39.23 kg/m^2    BP: 138/78 mmHg    Allergies    - No Known Allergies. Conclusions      Summary   Technically difficult examination. Left ventricular size is normal and systolic function is mildly reduced. Ejection fraction was estimated at 40-45%. LV wall thickness is within   normal limits. Trace mitral regurgitation is present. The right ventricular size was normal with normal systolic function and   wall thickness. Trivial tricuspid regurgitation visualized. Signature      ----------------------------------------------------------------   Electronically signed by Will Dodson MD (Interpreting   physician) on 04/19/2019 at 05:13 PM   ----------------------------------------------------------------      Findings      Mitral Valve   The mitral valve was not well visualized . Trace mitral regurgitation is present. Aortic Valve   The aortic valve appears trileaflet with normal thickness and leaflet   excursion. DOPPLER: Transaortic velocity was within the normal range with   no evidence of aortic stenosis. There was no evidence of aortic   regurgitation. Tricuspid Valve   Tricuspid valve was not well visualized. Trivial tricuspid regurgitation visualized. Pulmonic Valve   The pulmonic valve was not well visualized . Left Atrium   Left atrial size was normal.      Left Ventricle   Left ventricular size is normal and systolic function is mildly reduced. Ejection fraction was estimated at 40-45%. LV wall thickness is within   normal limits. Right Atrium   Right atrial size was normal.      Right Ventricle   The right ventricular size was normal with normal systolic function and   wall thickness. Pericardial Effusion   The pericardium was normal in appearance with no evidence of a pericardial   effusion.       Pleural Effusion   No evidence of pleural effusion. Aorta / Great Vessels   -Aortic root dimension within normal limits.   -The Pulmonary artery is within normal limits. -IVC size is within normal limits with normal respiratory phasic changes. M-Mode/2D Measurements & Calculations      LV Diastolic    LV Systolic Dimension: 4.3  AV Cusp Separation: 1.7 cmLA   Dimension: 5.4  cm                          Dimension: 4.2 cmAO Root   cm              LV Volume Diastolic: 652 ml Dimension: 3.6 cm   LV FS:20.4 %    LV Volume Systolic: 58.9 ml   LV PW           LV EDV/LV EDV Index: 641   Diastolic: 1.1  JT/39 X^2YZ ESV/LV ESV   cm              Index: 79.5 ml/35 m^2       RV Diastolic Dimension: 3.5 cm   Septum          EF Calculated: 74.5 %   Diastolic: 0.9                              LA/Aorta: 1.17   cm     Doppler Measurements & Calculations      MV Peak E-Wave: 60 cm/s    AV Peak Velocity: 111 LVOT Peak Velocity: 88.1   MV Peak A-Wave: 95.6 cm/s  cm/s                  cm/s   MV E/A Ratio: 0.63         AV Peak Gradient:     LVOT Peak Gradient: 3   MV Peak Gradient: 1.44     4.93 mmHg             mmHg   mmHg      MV Deceleration Time: 261   msec                                             TR Velocity:244 cm/s                                                    TR Gradient:23.81 mmHg                                                    PV Peak Velocity: 95.4   MV E' Septal Velocity: 5.7                       cm/s   cm/s                       AV DVI (Vmax):0.79    PV Peak Gradient: 3.64   MV A' Septal Velocity:                           mmHg   10.2 cm/s   MV E' Lateral Velocity:   9.5 cm/s   MV A' Lateral Velocity:   11.9 cm/s   E/E' septal: 10.53   E/E' lateral: 6.32     [id number]       STRESS:    CATH:    Assessment/Plan       Diagnosis Orders   1.  Coronary artery disease due to lipid rich plaque         CAD s/p CABG

## 2021-04-26 ENCOUNTER — HOSPITAL ENCOUNTER (OUTPATIENT)
Dept: PHYSICAL THERAPY | Age: 86
Setting detail: THERAPIES SERIES
Discharge: HOME | End: 2021-04-26
Payer: MEDICARE

## 2021-04-26 PROCEDURE — 97140 MANUAL THERAPY 1/> REGIONS: CPT

## 2021-04-26 PROCEDURE — 97162 PT EVAL MOD COMPLEX 30 MIN: CPT

## 2021-04-26 NOTE — FLOWSHEET NOTE
** PLEASE SIGN, DATE AND TIME CERTIFICATION BELOW AND RETURN TO [facility] OUTPATIENT REHABILITATION (FAX #: [phone]). ATTEST/CO-SIGN IF ACCESSING VIA [employer]. THANK YOU.**    I certify that I have examined the patient below and determined that Physical Medicine and Rehabilitation service is necessary and that I approve the established plan of care for up to 90 days or as specifically noted. Attestation, signature or co-signature of physician indicates approval of certification requirements.    ________________________ ____________ __________  Physician Signature   Date   Time  [facility]  PHYSICAL [facility]ERAPY  [x] LYMPHEDEMA SERVICES EVALUATION  [] DAILY NOTE [] PROGRESS NOTE [] DISCHARGE NOTE    [x] OUTPATIENT REHABILITATION CENTER [facility] LYMPHEDEMA SERVICES   [] Dougierahda     [] [address] [facility]   [] Katheryn [facility]    Date: 2021  Patient Name:  Migel Robles  : 1934  MRN: [id number]  CSN: [id number]    Referring Practitioner PEPITO Moulton*   Diagnosis BILATERAL LOWER EXTREMITY LYPMHEDEMA    Treatment Diagnosis Difficulty with prolonged standing/ambulation. Date of Evaluation 21       Functional Outcome Measure Used LYMPHEDEMA LIFE IMPACT SCALE (Version 2)   Functional Outcome Score 52 (76%) (21)       Insurance: Primary: Payor: MEDICARE /  /  / ,   Secondary: [employer]   Authorization Information: No pre-cert required. Visit # 1, 1/10 for progress note   Visits Allowed: Patient has unlimited visits based on medical necessity. Recertification Date: 8875   Physician Follow-Up: NA   Physician Orders: For Lymphedema evaluation and treatment. Cancer History Skin cancer. Prior Lymphedema Treatment Utilized Elevation and Patient's daughter has been applying compression bandages on bilateral lower extremities.    Additional Pertinent History CHF, Diabetes and Kidney Problems of too many lymphatic vessels that do not function properly. Moderate   Hyperpigmentation - Darkening of skin Moderate   Papillomas - Outward growth on skin None   Lymphorrhea (Weeping) - Lymph leakage through the skin, also known as Weeping Lymphedema. None  LYMPHEDEMA LEAKAGE OF EURFQ:17177}   Pitting Edema Slight (+1)   Skin Condition Dry   Open Wound(s) No Open Wounds   Tissue Temperature Normal   Skin Folds None   Fibrotic Tissue Moderate   Orange Peel Texture None   Nailbed Appearance/Condition Impaired     Circimferential Measurements  Circumferential Measurements for 4/26/2021 are as follows:   Left Right   Metatarsal heads 27.6 cm 26.2 cm   Arch of foot 26.2 cm 27.5 cm   Ankle (11 cm) 24.4 cm 24.7 cm   Calf (35 cm) 41.7 cm 42.7 cm   (Length: Ankle to knee joint line) 34.0 cm 34.3 cm     Treatment   X in shaded column indicates activity completed today   Treatment/Intervention   Notes   Skin Care Washed involved body part/extremity, Applied Original Eucerin lotion moisturizer to skin, Instructed patient on skin care precautions. and Instructed patient on steps to prevent edema and infection. X    Compression Bandaging Location: Bilateral lower extremities (Metatarsal heads to knee joint line), Compression Gradient: Minimal to Moderate (30-40 mmHg), Supplies (per involved extremity): Stockinette:  Size: 3 inches. Thickness: Thick, soft. 15 cm Artiflex Cotton: 1 roll  6 cm Rosidal K:1 roll  10 cm Rosidal K:1 roll  Rosidal Soft:1 roll  -1/4 inch thick grey foam applied at anterior/medial/lateral aspect of distal lower leg) X -Patient denied pain after application of compression bandages and was able to ambulate without reports of pain. Compression Pump -     Kinesiotaping -     Decongestive Therapy Exericses -The patient was able to complete Decongestive Therapy exercises (x 10 reps) including ankle pumps.  The exercises were completed with compression bandages on, without complaints of pain from the patient. The Decongestive Therapy exercises assist with decreasing the swelling by increasing the muscle pumping action of the lymph collectors and by increasing the fluid uptake in the lymphatic system. X -Instructed the patient to complete ankle pumps hourly with compression garments on bilateral lower extremities. Specific Interventions Next Treatment:   1) Recommend compression garments with velcro. Activity/Treatment Tolerance:  [x]  Patient tolerated evaluation well  []  Patient limited by fatigue  []  Patient limited by pain   []  Patient limited by medical complications  []  Other:     Patient Education:   [x]  HEP/Education Completed: Plan of Care, Goals.  Talasim Access Code:  []  No new Education completed  []  Reviewed Prior HEP      [x]  Patient verbalized and/or demonstrated understanding of education provided. []  Patient unable to verbalize and/or demonstrate understanding of education provided. Will continue education. [x]  Barriers to learning: Physical    Assessment: Body Structures/Functions/Activity Limitations: Increased edema, Increased risk of infection, Decreased range of motion, Decreased functional mobility, Decreased ADL's, Decreased strength, Decreased endurance, Decreased coordination and Decreased balance  Prognosis: fair (+)    GOALS:  Patient Goal: \"I just want to keep my legs down\" per patient. Short Term Goals:  -Refer to Long Term Goals. Long Term Goals:  Time Frame: 12 weeks. * Patient/family/caregiver will demonstrate donning/doffing compression garments with modified independence to wear compression garments daily to keep swelling down. * Patient/family/caregiver will verbalize a good understanding regarding proper wearing and replacement schedule, precautions and care of garment(s).     PLAN:  Treatment Recommendations:Skin Care/Education, Compression Bandaging, Garment Fitting/Assessment, Self-Care Training and Safety, Balance Training, Functional Mobility Training, Safety Training, Patient Education and Home Exercise Program    [x]  Plan of care initiated. Plan to see patient 1 times per week for 12 weeks to address the treatment planned outlined above. []  Referral for Sequential Compression Pump  []  Continue with current plan of care  []  Modify plan of care as follows:    []  Hold pending physician visit  []  Discharge    Time In 0900   Time Out 0952   Timed Code Minutes: 25 min   Total Treatment Time: 52 min     Thank you Watson Adan, PEPITO* for allowing me to assist in the care of your patient.     Electronically Signed by: Rodrick Gray on 4/26/2021

## 2021-06-02 RX ORDER — POTASSIUM CHLORIDE 1500 MG/1
20 TABLET, FILM COATED, EXTENDED RELEASE ORAL ONCE
Qty: 30 TABLET | Refills: 6 | Status: SHIPPED | OUTPATIENT
Start: 2021-06-02 | End: 2021-06-02

## 2021-06-09 ENCOUNTER — HOSPITAL ENCOUNTER (OUTPATIENT)
Dept: PHYSICAL THERAPY | Age: 86
Setting detail: THERAPIES SERIES
Discharge: HOME | End: 2021-06-09
Payer: MEDICARE

## 2021-06-09 PROCEDURE — 97535 SELF CARE MNGMENT TRAINING: CPT

## 2021-06-09 PROCEDURE — 97140 MANUAL THERAPY 1/> REGIONS: CPT

## 2021-06-09 NOTE — PROGRESS NOTES
[address] [facility]  PHYSICAL THERAPY  [] LYMPHEDEMA SERVICES EVALUATION  [x] DAILY NOTE [] PROGRESS NOTE [] DISCHARGE NOTE    [x] OUTPATIENT REHABILITATION CENTER [facility] LYMPHEDEMA SERVICES   [] [facility]    [] [facility]   [] Alessandro Cleaning    Date: 2021  Patient Name:  Ramses Viramontes  : 1934  MRN: [id number]  CSN: [id number]    Referring Practitioner Flo Schilder, APRN*   Diagnosis BILATERAL LOWER EXTREMITY LYPMHEDEMA    Treatment Diagnosis Difficulty with prolonged standing/ambulation. Date of Evaluation 21       Functional Outcome Measure Used LYMPHEDEMA LIFE IMPACT SCALE (Version 2)   Functional Outcome Score 52 (76%) (21)       Insurance: Primary: Payor: MEDICARE /  /  / ,   Secondary: [employer]   Authorization Information: No pre-cert required. Visit # 2, 2/10 for progress note   Visits Allowed: Patient has unlimited visits based on medical necessity. Recertification Date:    Physician Follow-Up: NA   Physician Orders: For Lymphedema evaluation and treatment. Cancer History Skin cancer. Prior Lymphedema Treatment Utilized Elevation and Patient's daughter has been applying compression bandages on bilateral lower extremities. Additional Pertinent History CHF, Diabetes and Kidney Problems   Restrictions/Precautions Standard/Universal Precautions, Fall Risk, High Risk for Infection and COVID 19 precautions: Patient wore facial mask during evaluation. Pain 5/10 - Pain location: Bilateral lower extremities. .   Pain Description: Sore, and slightly tight. SUBJECTIVE:     2021 The patient presented to the Lymphedema clinic on this date with compression bandages on bilateral lower extremities. He was accompanied by his supportive daughter, who is Certified in Complete Decongestive Therapy/Manual Lymph Drainage (CDT/MLD).     Social/Functional History and Current Status:  Medications and Allergies have been reviewed and are listed on Medical History Questionnaire. Nette Goddard lives with spouse in a single story home with stairs and a handrail to enter. Support System: Spouse/Significant Other and Children    Treatment   X in shaded column indicates activity completed today   Treatment/Intervention   Notes   Skin Care Washed involved body part/extremity, Applied Original Eucerin lotion moisturizer to skin, Instructed patient on skin care precautions. and Instructed patient on steps to prevent edema and infection. X -Bilateral lower extremities. Compression Bandaging -Refer to Compression Garment Fitting/Assessment. X    Compression Pump -     Kinesiotaping -     Decongestive Therapy Exericses -The patient was able to complete Decongestive Therapy exercises (x 10 reps) including ankle pumps. The exercises were completed with compression bandages on, without complaints of pain from the patient. The Decongestive Therapy exercises assist with decreasing the swelling by increasing the muscle pumping action of the lymph collectors and by increasing the fluid uptake in the lymphatic system. X -Bilateral lower extremities with compression garments (with velcro) on legs. Patient denied pain with new compression garments on. COMPRESSION GARMENT ASSESSMENT AND FITTING     -BRAND: DynadmicT FARROW WRAP     -TYPE: Strong (Bilateral lower leg component and foot/ankle wraps with velcro)     -COMPRESSION GRADIENT: Adjustable with velcro (Recommend not greater than 30-40 mm Hg)     -GARMENT SIZE: Lower leg component (Size XS), Foot/ankle component (Size Small)     -ADDITIONAL INFORMATION ON GARMENT: Applied JOBST Farrow Knee Liner applied on legs after skin care (Under compression components with velcro).      -HAS A GOOD FIT BEEN ACHIEVED: Yes       -The therapist donned the compression garment on bilateral lower extremities .        -The patient is able to proceed with wearing the compression garment and Home Exercise Program    []  Plan of care initiated. Plan to see patient 1 times per week for 12 weeks to address the treatment planned outlined above. []  Referral for Sequential Compression Pump  [x]  Continue with current plan of care (Follow up 1-2 weeks)  []  Modify plan of care as follows:    []  Hold pending physician visit  []  Discharge    Time In 1300   Time Out 1355   Timed Code Minutes: 55 min   Total Treatment Time: 55 min     Thank you PEPITO Kidd* for allowing me to assist in the care of your patient.     Electronically Signed by: Jenelle Petit PT on 6/9/2021

## 2021-07-11 NOTE — DISCHARGE SUMMARY
[facility]  PHYSICAL THERAPY  [] LYMPHEDEMA SERVICES EVALUATION  [] DAILY NOTE [] PROGRESS NOTE [x] DISCHARGE NOTE    [x] OUTPATIENT REHABILITATION CENTER [facility] LYMPHEDEMA SERVICES   [] [address]    [] [facility]   [] Sukumar Mylar    Date: 2021  Patient Name:  Steph Hunter  : 1934  MRN: [id number]  CSN: [id number]    Referring Practitioner PEPITO Gomez*   Diagnosis BILATERAL LOWER EXTREMITY LYPMHEDEMA    Treatment Diagnosis Difficulty with prolonged standing/ambulation. Date of Evaluation 21       Functional Outcome Measure Used LYMPHEDEMA LIFE IMPACT SCALE (Version 2)   Functional Outcome Score 43 (63%)-Discharge      Insurance: Primary: Payor: MEDICARE /  /  / ,   Secondary: [employer]   Authorization Information: No pre-cert required. Visit # 2, 2/10    Visits Allowed: Patient has unlimited visits based on medical necessity. Recertification Date: 5441   Physician Follow-Up: NA   Physician Orders: For Lymphedema evaluation and treatment. Cancer History Skin cancer. Prior Lymphedema Treatment Utilized Elevation and Patient's daughter has been applying compression bandages on bilateral lower extremities. Additional Pertinent History CHF, Diabetes and Kidney Problems   Restrictions/Precautions Standard/Universal Precautions, Fall Risk, High Risk for Infection and COVID 19 precautions: Patient wore facial mask during evaluation. Activity/Treatment Tolerance: (During eval and treatment session)  [x]  Patient tolerated treatment well  [x]  Patient limited by decreased endurance/weakness  []  Patient limited by pain   []  Patient limited by medical complications  []  Other:     Patient Education:   []  HEP/Education Completed: Plan of Care, Goals, Compression Garment Assessment/Fitting.    [employer] Access Code:  []  No new Education completed  []  Reviewed Prior HEP      []  Patient verbalized and/or demonstrated understanding of education provided. []  Patient unable to verbalize and/or demonstrate understanding of education provided. Will continue education. []  Barriers to learning: Physical    GOALS:  Patient Goal: \"I just want to keep my legs down\" per patient. Short Term Goals:  -Refer to Long Term Goals. Long Term Goals:  Time Frame: 12 weeks. * Patient/family/caregiver will demonstrate donning/doffing compression garments with modified independence to wear compression garments daily to keep swelling down. (MET)  * Patient/family/caregiver will verbalize a good understanding regarding proper wearing and replacement schedule, precautions and care of garment(s). (MET)    PLAN:  Treatment Recommendations:Skin Care/Education, Compression Bandaging, Garment Fitting/Assessment, Self-Care Training and Safety, Balance Training, Functional Mobility Training, Safety Training, Patient Education and Home Exercise Program    []  Plan of care initiated. Plan to see patient 1 times per week for 12 weeks to address the treatment planned outlined above. []  Referral for Sequential Compression Pump  []  Continue with current plan of care  []  Modify plan of care as follows:    []  Hold pending physician visit  [x]  Discharge (Patient's daughter stated that her father has been tolerating the new compression garments with velcro well. She stated that her mother has also been working to become modified independent with application of the new compression garments.)    NO CHARGES ON THIS DATE: DISCHARGE NOTE ONLY. Time In 0   Time Out 0   Timed Code Minutes: 0   Total Treatment Time: 0     Thank you JYOTI Cason for allowing me to assist in the care of your patient.     Electronically Signed by: Laura Bourgeois PT on 7/11/2021

## 2022-01-19 ENCOUNTER — TELEPHONE (OUTPATIENT)
Dept: UROLOGY | Age: 87
End: 2022-01-19

## 2022-02-15 RX ORDER — METOPROLOL TARTRATE 50 MG/1
50 TABLET, FILM COATED ORAL 2 TIMES DAILY
Qty: 180 TABLET | Refills: 0 | Status: SHIPPED | OUTPATIENT
Start: 2022-02-15 | End: 2022-05-09

## 2022-02-15 NOTE — TELEPHONE ENCOUNTER
Cherryl Gowers called requesting a refill on the following medications:  Requested Prescriptions     Pending Prescriptions Disp Refills    metoprolol tartrate (LOPRESSOR) 50 MG tablet 180 tablet 3     Sig: Take 1 tablet by mouth 2 times daily     Pharmacy verified: [facility] [place], [place]  .      Date of last visit: 4/16/2021  Date of next visit (if applicable): 2/48/4034

## 2022-02-21 ENCOUNTER — OFFICE VISIT (OUTPATIENT)
Dept: CARDIOLOGY CLINIC | Age: 87
End: 2022-02-21
Payer: MEDICARE

## 2022-02-21 VITALS
HEART RATE: 59 BPM | HEIGHT: 69 IN | SYSTOLIC BLOOD PRESSURE: 118 MMHG | BODY MASS INDEX: 37.47 KG/M2 | DIASTOLIC BLOOD PRESSURE: 64 MMHG | WEIGHT: 253 LBS

## 2022-02-21 DIAGNOSIS — R06.02 SOB (SHORTNESS OF BREATH): ICD-10-CM

## 2022-02-21 DIAGNOSIS — I25.83 CORONARY ARTERY DISEASE DUE TO LIPID RICH PLAQUE: Primary | ICD-10-CM

## 2022-02-21 DIAGNOSIS — I25.10 CORONARY ARTERY DISEASE DUE TO LIPID RICH PLAQUE: Primary | ICD-10-CM

## 2022-02-21 PROCEDURE — G8484 FLU IMMUNIZE NO ADMIN: HCPCS | Performed by: INTERNAL MEDICINE

## 2022-02-21 PROCEDURE — 4040F PNEUMOC VAC/ADMIN/RCVD: CPT | Performed by: INTERNAL MEDICINE

## 2022-02-21 PROCEDURE — 93000 ELECTROCARDIOGRAM COMPLETE: CPT | Performed by: INTERNAL MEDICINE

## 2022-02-21 PROCEDURE — 1036F TOBACCO NON-USER: CPT | Performed by: INTERNAL MEDICINE

## 2022-02-21 PROCEDURE — 99213 OFFICE O/P EST LOW 20 MIN: CPT | Performed by: INTERNAL MEDICINE

## 2022-02-21 PROCEDURE — G8417 CALC BMI ABV UP PARAM F/U: HCPCS | Performed by: INTERNAL MEDICINE

## 2022-02-21 PROCEDURE — G8427 DOCREV CUR MEDS BY ELIG CLIN: HCPCS | Performed by: INTERNAL MEDICINE

## 2022-02-21 PROCEDURE — 1123F ACP DISCUSS/DSCN MKR DOCD: CPT | Performed by: INTERNAL MEDICINE

## 2022-02-21 NOTE — PROGRESS NOTES
[address] [place] [address],[place] [address] Anshul Kovacs [facility] [address]  [facility] [address]  Dept: [phone]  Dept Fax: [phone]  Loc: [phone]      Visit Date: 2/21/2022    Mr. Niles Raymond is a 80 y.o. male  who presented for:  Chief Complaint   Patient presents with    Follow-up       HPI:   81 yo M c hx of CAD s/p CABG x3, 2016, hx MI,COPD, HTN, HLD, CHF, TIA is here for a follow up. Denies any chest pain, palpitations, SOB, lightheadedness, dizziness, orthopnea, PND, leg swelling, leg pain.       Current Outpatient Medications:     metoprolol tartrate (LOPRESSOR) 50 MG tablet, Take 1 tablet by mouth 2 times daily, Disp: 180 tablet, Rfl: 0    aspirin 325 MG tablet, Take 1 tablet by mouth daily, Disp: , Rfl:     zinc sulfate (ZINCATE) 220 (50 Zn) MG capsule, Take 1 capsule by mouth daily, Disp: , Rfl:     vitamin C (VITAMIN C) 1000 MG tablet, Take 1 tablet by mouth daily, Disp: , Rfl:     Lactobacillus (ACIDOPHILUS/PECTIN PO), Take by mouth, Disp: , Rfl:     Multiple Vitamins-Minerals (THERAPEUTIC MULTIVITAMIN-MINERALS) tablet, Take 1 tablet by mouth daily, Disp: , Rfl:     Cholecalciferol (VITAMIN D PO), Take 1,000 Units by mouth 2 times daily Vit D3, Disp: , Rfl:     furosemide (LASIX) 40 MG tablet, Take 1 tablet by mouth daily, Disp: 30 tablet, Rfl: 3    atorvastatin (LIPITOR) 20 MG tablet, Take 1 tablet by mouth nightly, Disp: 30 tablet, Rfl: 3    finasteride (PROSCAR) 5 MG tablet, Take 5 mg by mouth daily, Disp: , Rfl:     potassium chloride (KLOR-CON M) 20 MEQ TBCR extended release tablet, Take 1 tablet by mouth once for 1 dose, Disp: 30 tablet, Rfl: 6    Past Medical History  Garett Marie  has a past medical history of Acute on chronic diastolic CHF (congestive heart failure), NYHA class 2 (HCC), Arthritis, Atrial flutter (HCC), BPH (benign prostatic hyperplasia), CAD (coronary artery disease), Cancer ([facility] [address] CKD (chronic kidney disease) stage 3, GFR 30-59 ml/min ([facility] [address] COPD with exacerbation ([facility] [address] Hyperlipidemia, Hypertension, NSTEMI (non-ST elevated myocardial infarction) ([facility] [address] Obesity (BMI 35.0-39.9 without comorbidity), ARGENIS (obstructive sleep apnea), S/P CABG x 3, S/P cardiac cath: 6/20/2016: Tight LM lesion estimated at 90% along with the ostium of LAD. The ostium of the LCX seems to be significantly narrowed as well. 90% mid-RCA and 100% distal RCA, and TIA (transient ischemic attack). Social History  Clarita Lozoya  reports that he quit smoking about 72 years ago. His smoking use included cigarettes. He started smoking about 75 years ago. He has a 7.50 pack-year smoking history. He has never used smokeless tobacco. He reports current alcohol use of about 2.0 standard drinks of alcohol per week. He reports that he does not use drugs. Family History  Clarita Lozoya family history includes Cancer in his father. Past Surgical History   Past Surgical History:   Procedure Laterality Date    CARDIAC SURGERY  06/2016    triple by pass    CYSTOSCOPY  04/20/2017    Right Ureteroscopy, Basket Retrieval of Stones, Stent - Dr. Divine Dang  2009??    bilateral cataracts    MOHS SURGERY Left 01/26/2018    Mohs Repair BCC Left [place]     AL OUTER EAR SURGERY PROC UNLISTED Left 1/26/2018    MOHS REPAIR BCC LEFT EARLOBE performed by Jairo Whaley MD at [facility]         Subjective:     REVIEW OF SYSTEMS  Constitutional: denies sweats, chills and fever  HENT: denies  congestion, sinus pressure, sneezing and sore throat. Eyes: denies  pain, discharge, redness and itching. Respiratory: denies apnea, cough  Gastrointestinal: denies blood in stool, constipation, diarrhea   Endocrine: denies cold intolerance, heat intolerance, polydipsia. Genitourinary: denies dysuria, enuresis, flank pain and hematuria. Musculoskeletal: denies arthralgias, joint swelling and neck pain. Neurological: denies numbness and headaches.    Psychiatric/Behavioral: denies agitation, confusion, decreased concentration and dysphoric mood    All others reviewed and are negative. Objective:     /64   Pulse 59   Ht 5' 9\" (1.753 m)   Wt 253 lb (114.8 kg)   BMI 37.36 kg/m²     Wt Readings from Last 3 Encounters:   02/21/22 253 lb (114.8 kg)   04/16/21 250 lb (113.4 kg)   11/17/20 258 lb (117 kg)     BP Readings from Last 3 Encounters:   02/21/22 118/64   04/16/21 138/72   11/18/20 133/63       PHYSICAL EXAM  Constitutional: Oriented to person, place, and time. Appears well-developed and well-nourished. HENT:   Head: Normocephalic and atraumatic. Eyes: EOM are normal. Pupils are equal, round, and reactive to light. Neck: Normal range of motion. Neck supple. No JVD present. Cardiovascular: Normal rate, normal heart sounds and intact distal pulses. Pulmonary/Chest: Effort normal and breath sounds normal. No respiratory distress. No wheezes. No rales. Abdominal: Soft. Bowel sounds are normal. No distension. There is no tenderness. Musculoskeletal: Normal range of motion. Bilateral lower extremity edema. Neurological: Alert and oriented to person, place, and time. No cranial nerve deficit. Coordination normal.   Skin: Skin is warm and dry. Psychiatric: Normal mood and affect.        No results found for: CKTOTAL, CKMB, CKMBINDEX    Lab Results   Component Value Date    WBC 6.0 11/16/2020    RBC 3.98 11/16/2020    HGB 12.8 11/16/2020    HCT 39.8 11/16/2020    .0 11/16/2020    MCH 32.2 11/16/2020    MCHC 32.2 11/16/2020    RDW 14.6 03/22/2017     11/16/2020    MPV 9.4 11/16/2020       Lab Results   Component Value Date     11/16/2020    K 5.0 11/16/2020    K 4.6 11/05/2020    CL 99 11/16/2020    CO2 28 11/16/2020    BUN 19 11/16/2020    LABALBU 2.8 11/05/2020    CREATININE 1.1 11/16/2020    CALCIUM 9.2 11/16/2020    LABGLOM 63 11/16/2020    GLUCOSE 109 11/16/2020       Lab Results   Component Value Date    ALKPHOS 59 11/05/2020    ALT 21 11/05/2020    AST 33 11/05/2020    PROT 6.3 11/05/2020    BILITOT 0.3 11/05/2020    BILIDIR <0.2 03/19/2017    LABALBU 2.8 11/05/2020       Lab Results   Component Value Date    MG 2.1 11/04/2020       Lab Results   Component Value Date    INR 1.09 11/05/2020    INR 0.98 06/01/2017    INR 1.52 (H) 03/20/2017         Lab Results   Component Value Date    LABA1C 5.1 06/15/2016       Lab Results   Component Value Date    TRIG 77 06/20/2016    HDL 48 06/20/2016    LDLCALC 34 06/20/2016       Lab Results   Component Value Date    TSH 2.340 11/04/2020         Testing Reviewed:      I have individually reviewed the below cardiac tests    EKG:     ECHO:   Results for orders placed during the hospital encounter of 04/19/19   ECHO Complete 2D W Doppler W Color    Narrative Transthoracic Echocardiography Report (TTE)     Demographics      Patient Name    Edinson Rizvi Gender                Male      MR #            [id number]       Race                                                        Ethnicity      Account #       [de-identified]       Room Number      Accession       [id number]       Date of Study         04/19/2019   Number      Date of Birth   1934      Referring Physician   Dell Tapia CNP      Age             80 year(s)      Indra Mina [facility]                                      Interpreting          Natalie Ray MD                                   Physician     Procedure    Type of Study      TTE procedure:ECHOCARDIOGRAM COMPLETE 2D W DOPPLER W COLOR. Procedure Date  Date: 04/19/2019 Start: 10:42 AM    Study Location: Echo Lab  Technical Quality: Limited visualization due to body habitus. Indications:Cardiomyopathy.     Additional Medical History:Chronic kidney disease, NSTEMI, Obstructive sleep  apnea, Coronary artery disease, CABG, Transient ischemic attack,  Hypertension, Hyperlipidemia, Aflutter, Congestive heart failure    Patient Status: Routine    Height: 68 inches Weight: 258.01 pounds BSA: 2.28 m^2 BMI: 39.23 kg/m^2    BP: 138/78 mmHg    Allergies    - No Known Allergies. Conclusions      Summary   Technically difficult examination. Left ventricular size is normal and systolic function is mildly reduced. Ejection fraction was estimated at 40-45%. LV wall thickness is within   normal limits. Trace mitral regurgitation is present. The right ventricular size was normal with normal systolic function and   wall thickness. Trivial tricuspid regurgitation visualized. Signature      ----------------------------------------------------------------   Electronically signed by Lina Rooney MD (Interpreting   physician) on 04/19/2019 at 05:13 PM   ----------------------------------------------------------------      Findings      Mitral Valve   The mitral valve was not well visualized . Trace mitral regurgitation is present. Aortic Valve   The aortic valve appears trileaflet with normal thickness and leaflet   excursion. DOPPLER: Transaortic velocity was within the normal range with   no evidence of aortic stenosis. There was no evidence of aortic   regurgitation. Tricuspid Valve   Tricuspid valve was not well visualized. Trivial tricuspid regurgitation visualized. Pulmonic Valve   The pulmonic valve was not well visualized . Left Atrium   Left atrial size was normal.      Left Ventricle   Left ventricular size is normal and systolic function is mildly reduced. Ejection fraction was estimated at 40-45%. LV wall thickness is within   normal limits. Right Atrium   Right atrial size was normal.      Right Ventricle   The right ventricular size was normal with normal systolic function and   wall thickness. Pericardial Effusion   The pericardium was normal in appearance with no evidence of a pericardial   effusion.       Pleural Effusion   No evidence of pleural effusion. Aorta / Great Vessels   -Aortic root dimension within normal limits.   -The Pulmonary artery is within normal limits. -IVC size is within normal limits with normal respiratory phasic changes. M-Mode/2D Measurements & Calculations      LV Diastolic    LV Systolic Dimension: 4.3  AV Cusp Separation: 1.7 cmLA   Dimension: 5.4  cm                          Dimension: 4.2 cmAO Root   cm              LV Volume Diastolic: 608 ml Dimension: 3.6 cm   LV FS:20.4 %    LV Volume Systolic: 51.2 ml   LV PW           LV EDV/LV EDV Index: 992   Diastolic: 1.1  XX/77 K^8TB ESV/LV ESV   cm              Index: 79.5 ml/35 m^2       RV Diastolic Dimension: 3.5 cm   Septum          EF Calculated: 13.3 %   Diastolic: 0.9                              LA/Aorta: 1.17   cm     Doppler Measurements & Calculations      MV Peak E-Wave: 60 cm/s    AV Peak Velocity: 111 LVOT Peak Velocity: 88.1   MV Peak A-Wave: 95.6 cm/s  cm/s                  cm/s   MV E/A Ratio: 0.63         AV Peak Gradient:     LVOT Peak Gradient: 3   MV Peak Gradient: 1.44     4.93 mmHg             mmHg   mmHg      MV Deceleration Time: 261   msec                                             TR Velocity:244 cm/s                                                    TR Gradient:23.81 mmHg                                                    PV Peak Velocity: 95.4   MV E' Septal Velocity: 5.7                       cm/s   cm/s                       AV DVI (Vmax):0.79    PV Peak Gradient: 3.64   MV A' Septal Velocity:                           mmHg   10.2 cm/s   MV E' Lateral Velocity:   9.5 cm/s   MV A' Lateral Velocity:   11.9 cm/s   E/E' septal: 10.53   E/E' lateral: 6.32     [id number]       STRESS:    CATH:    Assessment/Plan       Diagnosis Orders   1. Coronary artery disease due to lipid rich plaque  EKG 12 lead   2.  SOB (shortness of breath)  EKG 12 lead       CAD s/p CABG  Lymphedema  TIA  COPD  HTN  HLD  Chronic CHF EF 40-45%    eKG shows Sinus bradycardia  Getting lympedema therapy  Daughter wraps legs for him every 3rd day  Denies orthopnea  No real heart failure symptoms. Present with son  EF 40-45%  Denies any symptoms at present  Reports that he feels good  BP well controlled  Continue Aspirin, statin, metoprolol, lasix  The patient is asked to make an attempt to improve diet and exercise patterns to aid in medical management of this problem. Advised patient to call office or seek immediate medical attention if there is any new onset of  any chest pain, sob, palpitations, lightheadedness, dizziness, orthopnea, PND or pedal edema. Thank you for allowing me to participate in the care of this patient. Please do not hesitate to contact me for any further questions. Return in about 1 year (around 2/21/2023), or if symptoms worsen or fail to improve, for Review testing, Regular follow up.        Electronically signed by Alejandra Martinez MD [facility]  2/21/2022 at 11:07 AM

## 2022-05-10 RX ORDER — METOPROLOL TARTRATE 50 MG/1
50 TABLET, FILM COATED ORAL 2 TIMES DAILY
Qty: 180 TABLET | Refills: 3 | Status: SHIPPED | OUTPATIENT
Start: 2022-05-10

## 2022-08-15 ENCOUNTER — HOSPITAL ENCOUNTER (OUTPATIENT)
Dept: PHYSICAL THERAPY | Age: 87
Setting detail: THERAPIES SERIES
Discharge: HOME | End: 2022-08-15
Payer: MEDICARE

## 2022-08-15 PROCEDURE — 97162 PT EVAL MOD COMPLEX 30 MIN: CPT

## 2022-08-15 PROCEDURE — 97110 THERAPEUTIC EXERCISES: CPT

## 2022-08-15 NOTE — FLOWSHEET NOTE
** PLEASE SIGN, DATE AND TIME CERTIFICATION BELOW AND RETURN TO [facility] OUTPATIENT REHABILITATION (FAX #: [phone]). ATTEST/CO-SIGN IF ACCESSING VIA [employer]Viewfinity. THANK YOU.**    I certify that I have examined the patient below and determined that Physical Medicine and Rehabilitation service is necessary and that I approve the established plan of care for up to 90 days or as specifically noted. Attestation, signature or co-signature of physician indicates approval of certification requirements.    ________________________ ____________ __________  Physician Signature   Date   Time   [facility]  PHYSICAL THERAPY  [x] EVALUATION  [] DAILY NOTE (LAND) [] DAILY NOTE (AQUATIC ) [] PROGRESS NOTE [] DISCHARGE NOTE    [] [facility]   [x] [facility]    [] [facility]   [] Adrien Even    Date: 8/15/2022  Patient Name:  Tona Suero  : 1934  MRN: [id number]  CSN: [id number]    Referring Practitioner PEPITO Rodriguez*   Diagnosis Other symptoms and signs involving the musculoskeletal system [R29.898]    Treatment Diagnosis B LE weakness, difficulty walking, decreased balance   Date of Evaluation 8/15/22   Additional Pertinent History MI, HTN, COPD, UTI, kidney stone, obesity, SOB      Functional Outcome Measure Used Bird balance test   Functional Outcome Score Eval  (8/15/22)       Insurance: Primary: Payor: MEDICARE /  /  / ,   Secondary: [employer]   Authorization Information: Pays at 80%, modalities covered except ionto/MHP/CP not covered   Visit # 1, 1/10 for progress note   Visits Allowed: Unlimited visits   Recertification Date:    Physician Follow-Up: 6 month check up   Physician Orders:    History of Present Illness: Gradual increase in weakness in legs and difficulty with mobility.   Daughter, who is PT, with patient for eval.  Patient has difficulty with getting in/out of chair- 2-3 attempts from normal chair, with difficulty walking more than room to room, also daughter is wrapping B LE lymphedema/CHF x 3 years. Did have COVID fall 2020, hospitalized x 3 minutes, TCU x 1 week , was on O2 but off x 1.5 years, and increased difficulty breathing, gradual decrease in endurance since then. Difficulty walking, fatigue in legs with walking room to room     SUBJECTIVE: over past month standing HEP, standing heel raises, squat 10 reps, sit to stand 2-3 times. Patient did have a fall 1 month ago getting into car. Social/Functional History and Current Status:  Medications and Allergies have been reviewed and are listed on Medical History Questionnaire. Pro Roberts lives with spouse in a single story home with stairs and a handrail to enter. 3 CHRISTY with HR on both sides. Difficulty getting in house at daughters with 3 steps, 1 HR and grab bar. Has long handled shoe horn, single leg cane, RW in/out and garage, tray on walker in house, has shower seat without back, transport chair at daughters house but it's too small    Task Previous Current   ADLs  Independent Modified Independent- daughter wraps legs and puts socks on every 3 days,    IADL's Independent Assistance Required has tub shower, hand held shower, wife sometimes helps to \"dry my butt\"   Ambulation Independent Modified Independent  does go to [facility] weekly-has to park close, go into side door, uses RW x 6 month   Transfers Independent Assistance Required- 2-3 times to get out of chair. Has been sleeping in lift chair x 2 years.    Recreation Independent Modified Independent   Community Integration Independent Modified Independent   Driving Active  Active    Work Retired  Retired       OBJECTIVE:  Pain Denies pain       Observation B LE    Posture poor       Range of Motion Hip: R hip flexion: 30, L 34 degrees, B hip abduction 6 degrees  Knee: B knee 0- 90 degrees- wrappings B LE foot to above knee due to generalized decreased mobility, weakness and decreased balance  Body Structures/Functions/Activity Limitations: impaired balance, impaired ROM, impaired strength, and abnormal gait  Prognosis: good    GOALS:  Patient Goal: to walk better    Short Term Goals:  Time Frame: deferred to LTG's    Long Term Goals:  Time Frame: 5 weeks  Increase AROM B hip flexion to 50, knee flexion to 100, ankle DF to 5 degrees to allow patient to report able to sit to stand with 1 attempt with no LOB  Increase strength B hip flexion to 4-/5 to allow patient to report able to go up and down steps in/out of house, daughters house without assist and B HR  I with HEP to improve balance with Tinetti balance to 20/28 to reduce risk of fall with patient able to walk x 10 minutes with RW for exercise in house with no LOB    Patient Education:   [x]  HEP/Education Completed: Plan of Care, Goals, HEP of above exercises  [facility] Access Code:[email or web address]  []  No new Education completed  []  Reviewed Prior HEP      []  Patient verbalized and/or demonstrated understanding of education provided. []  Patient unable to verbalize and/or demonstrate understanding of education provided. Will continue education. [x]  Barriers to learning: handouts needed    PLAN:  Treatment Recommendations: Strengthening, Range of Motion, Balance Training, Functional Mobility Training, Gait Training, Home Exercise Program, Patient Education, and Safety Education and Training    [x]  Plan of care initiated. Plan to see patient 2 times per week for 5 weeks to address the treatment planned outlined above.   []  Continue with current plan of care  []  Modify plan of care as follows:    []  Hold pending physician visit  []  Discharge    Time In 1445   Time Out 1545   Timed Code Minutes: 25 min   Total Treatment Time: 60 min       Electronically Signed by: Cassidy Julio PT

## 2022-08-17 ENCOUNTER — HOSPITAL ENCOUNTER (OUTPATIENT)
Dept: PHYSICAL THERAPY | Age: 87
Setting detail: THERAPIES SERIES
Discharge: HOME | End: 2022-08-17
Payer: MEDICARE

## 2022-08-17 PROCEDURE — 97110 THERAPEUTIC EXERCISES: CPT

## 2022-08-17 NOTE — PROGRESS NOTES
next to exercise/intervention indicates progression   Modalities Parameters/  Location  Notes                     Manual Therapy Time/Technique  Notes                     Exercise/Intervention   Notes   Ankle pump B 10  x    Heel slide R/L 5x each  x    Quad set 5 5s count out loud x Counting out loud to breath with reps   Sitting EOB scapular retraction 10 x elbows bent  x    Backward shoulder circles  Attempted but not understanding direction so held  x    LAQ EOB 5 5x x Count out loud   NuStep 3 minutes LE only  x    Sitting heel and toe raises 10 x each  x    Sitting marching 10  x                    Specific Interventions Next Treatment: NUStep, B LE AROM, stretching, strengthening, sit to stand, transfer education, gait training, balance, endurance , rest break as needed with COPD and exertional SOB    Activity/Treatment Tolerance:  []  Patient tolerated treatment well  [x]  Patient limited by fatigue  []  Patient limited by pain   []  Patient limited by medical complications  []  Other:     Assessment: added NuStep heel /toe raises, marching and NuStep , fatigued after NuStep    GOALS:  Patient Goal: to walk better    Short Term Goals:  Time Frame: deferred to LTG's    Long Term Goals:  Time Frame: 5 weeks  Increase AROM B hip flexion to 50, knee flexion to 100, ankle DF to 5 degrees to allow patient to report able to sit to stand with 1 attempt with no LOB  Increase strength B hip flexion to 4-/5 to allow patient to report able to go up and down steps in/out of house, daughters house without assist and B HR  I with HEP to improve balance with Tinetti balance to 20/28 to reduce risk of fall with patient able to walk x 10 minutes with RW for exercise in house with no LOB    Patient Education:   [x]  HEP/Education Completed: Plan of Care, Goals, HEP of above exercises  [facility] Access Code:[email or web address]  []  No new Education completed  []  Reviewed Prior HEP      []  Patient verbalized and/or demonstrated understanding of education provided. []  Patient unable to verbalize and/or demonstrate understanding of education provided. Will continue education. [x]  Barriers to learning: handouts needed    PLAN:  Treatment Recommendations: Strengthening, Range of Motion, Balance Training, Functional Mobility Training, Gait Training, Home Exercise Program, Patient Education, and Safety Education and Training    [x]  Plan of care initiated. Plan to see patient 2 times per week for 5 weeks to address the treatment planned outlined above.   []  Continue with current plan of care  []  Modify plan of care as follows:    []  Hold pending physician visit  []  Discharge    Time In 1330   Time Out 1400   Timed Code Minutes: 30 min   Total Treatment Time: 30 min       Electronically Signed by: Mila Morris PT

## 2022-08-23 ENCOUNTER — HOSPITAL ENCOUNTER (OUTPATIENT)
Dept: PHYSICAL THERAPY | Age: 87
Setting detail: THERAPIES SERIES
Discharge: HOME | End: 2022-08-23
Payer: MEDICARE

## 2022-08-23 PROCEDURE — 97110 THERAPEUTIC EXERCISES: CPT

## 2022-08-23 NOTE — PROGRESS NOTES
[address] [facility]  PHYSICAL THERAPY  [] EVALUATION  [x] DAILY NOTE (LAND) [] DAILY NOTE (AQUATIC ) [] PROGRESS NOTE [] DISCHARGE NOTE    [] [address] [facility]   [x] [address]    [] [address] [facility]   [] Chaz Taylor    Date: 2022  Patient Name:  Alissa Irwin  : 1934  MRN: [id number]  CSN: [id number]    Referring Practitioner Tomasz BEYER   Diagnosis Other symptoms and signs involving the musculoskeletal system [R29.898]    Treatment Diagnosis B LE weakness, difficulty walking, decreased balance   Date of Evaluation 8/15/22   Additional Pertinent History MI, HTN, COPD, UTI, kidney stone, obesity, SOB      Functional Outcome Measure Used Bird balance test   Functional Outcome Score Eval  (8/15/22)       Insurance: Primary: Payor: MEDICARE /  /  / ,   Secondary: [employer]   Authorization Information: Pays at 80%, modalities covered except ionto/MHP/CP not covered   Visit # 3, 3/10 for progress note   Visits Allowed: Unlimited visits   Recertification Date:    Physician Follow-Up: 6 month check up   Physician Orders:    History of Present Illness: Gradual increase in weakness in legs and difficulty with mobility. Daughter, who is PT, with patient for eval.  Patient has difficulty with getting in/out of chair- 2-3 attempts from normal chair, with difficulty walking more than room to room, also daughter is wrapping B LE lymphedema/CHF x 3 years. Did have COVID fall , hospitalized x 3 minutes, TCU x 1 week , was on O2 but off x 1.5 years, and increased difficulty breathing, gradual decrease in endurance since then.    Difficulty walking, fatigue in legs with walking room to room     SUBJECTIVE:  reports slight improvement with sit to stand with easewife and son present for session      OBJECTIVE:    TREATMENT   Precautions:    Pain:     \"X in shaded column indicates activity completed today    *\" next to exercise/intervention indicates progression   Modalities Parameters/  Location  Notes                     Manual Therapy Time/Technique  Notes                     Exercise/Intervention   Notes   Ankle pump B 15  x    Heel slide R/L 5x each  x    Quad set 10 5s count out loud x Counting out loud to breath with reps   SAQ 5 5s x    SLR small movement 5  x    Sitting EOB scapular retraction 15 x elbows bent  x    Backward shoulder circles  Attempted but not understanding direction so held  x    LAQ EOB 10 5x x Count out loud   NuStep 6 minutes LE only  x    Sitting heel and toe raises 15 x each  x    Sitting marching 15  x    Sitting EOB knee flexion R/L 5 5x              Specific Interventions Next Treatment: NUStep, B LE AROM, stretching, strengthening, sit to stand, transfer education, gait training, balance, endurance , rest break as needed with COPD and exertional SOB    Activity/Treatment Tolerance:  []  Patient tolerated treatment well  [x]  Patient limited by fatigue  []  Patient limited by pain   []  Patient limited by medical complications  []  Other:     Assessment: increased NuStep, reps with exercises, cues throughout session to keep feet inside walker, increased time as patient showed up early and PT had extra time.   SLR with difficulty but today was 1st time able to do 5 reps    GOALS:  Patient Goal: to walk better    Short Term Goals:  Time Frame: deferred to LT's    Long Term Goals:  Time Frame: 5 weeks  Increase AROM B hip flexion to 50, knee flexion to 100, ankle DF to 5 degrees to allow patient to report able to sit to stand with 1 attempt with no LOB  Increase strength B hip flexion to 4-/5 to allow patient to report able to go up and down steps in/out of house, daughters house without assist and B HR  I with HEP to improve balance with Tinetti balance to 20/28 to reduce risk of fall with patient able to walk x 10 minutes with RW for exercise in house with no LOB    Patient Education:   [x]  HEP/Education Completed: Plan of Care, Goals, HEP of above exercises  [employer] Access Code:[email or web address]  []  No new Education completed  []  Reviewed Prior HEP      []  Patient verbalized and/or demonstrated understanding of education provided. []  Patient unable to verbalize and/or demonstrate understanding of education provided. Will continue education. [x]  Barriers to learning: handouts needed    PLAN:  Treatment Recommendations: Strengthening, Range of Motion, Balance Training, Functional Mobility Training, Gait Training, Home Exercise Program, Patient Education, and Safety Education and Training    [x]  Plan of care initiated. Plan to see patient 2 times per week for 5 weeks to address the treatment planned outlined above.   []  Continue with current plan of care  []  Modify plan of care as follows:    []  Hold pending physician visit  []  Discharge    Time In 1305   Time Out 1345   Timed Code Minutes: 40 min   Total Treatment Time: 40 min       Electronically Signed by: Cosmo Sanchez PT

## 2022-08-26 ENCOUNTER — HOSPITAL ENCOUNTER (OUTPATIENT)
Dept: PHYSICAL THERAPY | Age: 87
Setting detail: THERAPIES SERIES
Discharge: HOME | End: 2022-08-26
Payer: MEDICARE

## 2022-08-26 PROCEDURE — 97110 THERAPEUTIC EXERCISES: CPT

## 2022-08-26 NOTE — PROGRESS NOTES
[address] [facility]  PHYSICAL THERAPY  [] EVALUATION  [x] DAILY NOTE (LAND) [] DAILY NOTE (AQUATIC ) [] PROGRESS NOTE [] DISCHARGE NOTE    [] [address] [facility]   [x] [address]    [] [address] [facility]   [] Judy Roof    Date: 2022  Patient Name:  Marisol Wilkes  : 1934  MRN: [id number]  CSN: [id number]    Referring Practitioner Radha BEYER   Diagnosis Other symptoms and signs involving the musculoskeletal system [R29.898]    Treatment Diagnosis B LE weakness, difficulty walking, decreased balance   Date of Evaluation 8/15/22   Additional Pertinent History MI, HTN, COPD, UTI, kidney stone, obesity, SOB      Functional Outcome Measure Used Bird balance test   Functional Outcome Score Eval  (8/15/22)       Insurance: Primary: Payor: MEDICARE /  /  / ,   Secondary: [employer]   Authorization Information: Pays at 80%, modalities covered except ionto/MHP/CP not covered   Visit # 4, 4/10 for progress note   Visits Allowed: Unlimited visits   Recertification Date:    Physician Follow-Up: 6 month check up   Physician Orders:    History of Present Illness: Gradual increase in weakness in legs and difficulty with mobility. Daughter, who is PT, with patient for eval.  Patient has difficulty with getting in/out of chair- 2-3 attempts from normal chair, with difficulty walking more than room to room, also daughter is wrapping B LE lymphedema/CHF x 3 years. Did have COVID fall , hospitalized x 3 minutes, TCU x 1 week , was on O2 but off x 1.5 years, and increased difficulty breathing, gradual decrease in endurance since then. Difficulty walking, fatigue in legs with walking room to room     SUBJECTIVE:  Patient denies having any pain today and reporting fair compliance with HEP.       OBJECTIVE:    TREATMENT   Precautions:    Pain:     \"X in shaded column indicates activity completed today    *\" next to exercise/intervention indicates progression   Modalities Parameters/  Location  Notes                     Manual Therapy Time/Technique  Notes                     Exercise/Intervention   Notes   Ankle pump B 15  x    Heel slide R/L 5x each  x    Quad set 10 5s count out loud x Counting out loud to breath with reps   SAQ 2x5* 5s x    SLR small movement 5  x    Sitting EOB scapular retraction 15 x elbows bent  x    Backward shoulder circles  Attempted but not understanding direction so held      LAQ EOB 10 5x x Count out loud   NuStep Level 3 6 minutes LE only  x    Sitting heel and toe raises 15 x each  x    Sitting marching 15      Sitting EOB knee flexion R/L 5 5x              Specific Interventions Next Treatment: NuStep, B LE AROM, stretching, strengthening, sit to stand, transfer education, gait training, balance, endurance , rest break as needed with COPD and exertional SOB    Activity/Treatment Tolerance:  []  Patient tolerated treatment well  [x]  Patient limited by fatigue  []  Patient limited by pain   []  Patient limited by medical complications  []  Other:     Assessment: Added reps to SAQ with good tolerance. Patient needing multiple cues to keep feet inside of walker with gait. Patient also needing cues for hand placement with tranfers with poor follow through. No complains reported at end of session.   GOALS:  Patient Goal: to walk better    Short Term Goals:  Time Frame: deferred to LT's    Long Term Goals:  Time Frame: 5 weeks  Increase AROM B hip flexion to 50, knee flexion to 100, ankle DF to 5 degrees to allow patient to report able to sit to stand with 1 attempt with no LOB  Increase strength B hip flexion to 4-/5 to allow patient to report able to go up and down steps in/out of house, daughters house without assist and B HR  I with HEP to improve balance with Tinetti balance to 20/28 to reduce risk of fall with patient able to walk x 10 minutes with RW for exercise in house with no LOB    Patient Education:   [x]  HEP/Education Completed: continue with HEP. Hand placement with transfers. [facility] Access Code:[email or web address]  []  No new Education completed  []  Reviewed Prior HEP      []  Patient verbalized and/or demonstrated understanding of education provided. []  Patient unable to verbalize and/or demonstrate understanding of education provided. Will continue education. [x]  Barriers to learning: handouts needed    PLAN:  Treatment Recommendations: Strengthening, Range of Motion, Balance Training, Functional Mobility Training, Gait Training, Home Exercise Program, Patient Education, and Safety Education and Training      Plan to see patient 2 times per week for 5 weeks to address the treatment planned outlined above.   [x]  Continue with current plan of care  []  Modify plan of care as follows:    []  Hold pending physician visit  []  Discharge    Time In 1302   Time Out 1328   Timed Code Minutes: 26 min   Total Treatment Time: 26 min       Electronically Signed by: Allison Dunham PTA

## 2022-08-29 ENCOUNTER — HOSPITAL ENCOUNTER (OUTPATIENT)
Dept: PHYSICAL THERAPY | Age: 87
Setting detail: THERAPIES SERIES
Discharge: HOME | End: 2022-08-29
Payer: MEDICARE

## 2022-08-29 PROCEDURE — 97110 THERAPEUTIC EXERCISES: CPT

## 2022-08-29 NOTE — PROGRESS NOTES
[address] [facility]  PHYSICAL THERAPY  [] EVALUATION  [x] DAILY NOTE (LAND) [] DAILY NOTE (AQUATIC ) [] PROGRESS NOTE [] DISCHARGE NOTE    [] [address] [facility]   [x] [address]    [] [address] [facility]   [] Matthew Hazard    Date: 2022  Patient Name:  Betzaida Teran  : 1934  MRN: [id number]  CSN: [id number]    Referring Practitioner Armida BEYER   Diagnosis Other symptoms and signs involving the musculoskeletal system [R29.898]    Treatment Diagnosis B LE weakness, difficulty walking, decreased balance   Date of Evaluation 8/15/22   Additional Pertinent History MI, HTN, COPD, UTI, kidney stone, obesity, SOB      Functional Outcome Measure Used Bird balance test   Functional Outcome Score Eval  (8/15/22)       Insurance: Primary: Payor: MEDICARE /  /  / ,   Secondary: [employer]   Authorization Information: Pays at 80%, modalities covered except ionto/MHP/CP not covered   Visit # 5, 10 for progress note   Visits Allowed: Unlimited visits   Recertification Date:    Physician Follow-Up: 6 month check up   Physician Orders:    History of Present Illness: Gradual increase in weakness in legs and difficulty with mobility. Daughter, who is PT, with patient for eval.  Patient has difficulty with getting in/out of chair- 2-3 attempts from normal chair, with difficulty walking more than room to room, also daughter is wrapping B LE lymphedema/CHF x 3 years. Did have COVID fall , hospitalized x 3 minutes, TCU x 1 week , was on O2 but off x 1.5 years, and increased difficulty breathing, gradual decrease in endurance since then. Difficulty walking, fatigue in legs with walking room to room     SUBJECTIVE:  Patient reports fatigue from just walking to bathroom before session.  States no pain upon arrival      OBJECTIVE:    TREATMENT   Precautions:    Pain:     \"X in shaded column indicates activity completed today    *\" next to exercise/intervention indicates progression   Modalities Parameters/  Location  Notes                     Manual Therapy Time/Technique  Notes                     Exercise/Intervention   Notes   Ankle pump B 15      Heel slide R/L 5x each      Quad set 10 5s count out loud x Counting out loud to breath with reps   SAQ 2x5 5s     SLR small movement 8*  x    Sitting EOB scapular retraction 15 x elbows bent  x    Backward shoulder circles  Attempted but not understanding direction so held      LAQ EOB 10 5x x Count out loud   NuStep Level 3 6 minutes LE only  x    Sitting heel and toe raises 15 x each  x    Sitting marching 15  x    Sitting EOB knee flexion R/L 10* 5s x    Seated LAQ* 10 5s x      Specific Interventions Next Treatment: NuStep, B LE AROM, stretching, strengthening, sit to stand, transfer education, gait training, balance, endurance , rest break as needed with COPD and exertional SOB    Activity/Treatment Tolerance:  []  Patient tolerated treatment well  [x]  Patient limited by fatigue  []  Patient limited by pain   []  Patient limited by medical complications  []  Other:     Assessment: Pt tolerated session fairly well- able to progress reps for SLR and knee flexion. Mild fatigue at beginning of session from walk to bathroom, poor gait mechanics continue with walker. Pt is able to self correct with verbal cueing however does not maintain.     GOALS:  Patient Goal: to walk better    Short Term Goals:  Time Frame: deferred to LTG's    Long Term Goals:  Time Frame: 5 weeks  Increase AROM B hip flexion to 50, knee flexion to 100, ankle DF to 5 degrees to allow patient to report able to sit to stand with 1 attempt with no LOB  Increase strength B hip flexion to 4-/5 to allow patient to report able to go up and down steps in/out of house, daughters house without assist and B HR  I with HEP to improve balance with Tinetti balance to 20/28 to reduce risk of fall with patient able to walk x 10 minutes with RW for exercise in house with no LOB    Patient Education:   [x]  HEP/Education Completed: continue with HEP. Hand placement with transfers. [employer] Access Code:[email or web address]  []  No new Education completed  [x]  Reviewed Prior HEP      []  Patient verbalized and/or demonstrated understanding of education provided. []  Patient unable to verbalize and/or demonstrate understanding of education provided. Will continue education. []  Barriers to learning: handouts needed    PLAN:  Treatment Recommendations: Strengthening, Range of Motion, Balance Training, Functional Mobility Training, Gait Training, Home Exercise Program, Patient Education, and Safety Education and Training      Plan to see patient 2 times per week for 5 weeks to address the treatment planned outlined above.   [x]  Continue with current plan of care  []  Modify plan of care as follows:    []  Hold pending physician visit  []  Discharge    Time In 1318   Time Out 1350   Timed Code Minutes:  32 min   Total Treatment Time: 32 min       Electronically Signed by: Chace Aguilar PTA

## 2022-09-01 ENCOUNTER — HOSPITAL ENCOUNTER (OUTPATIENT)
Dept: PHYSICAL THERAPY | Age: 87
Setting detail: THERAPIES SERIES
Discharge: HOME | End: 2022-09-01
Payer: MEDICARE

## 2022-09-01 PROCEDURE — 97110 THERAPEUTIC EXERCISES: CPT

## 2022-09-01 NOTE — PROGRESS NOTES
[address] [facility]  PHYSICAL THERAPY  [] EVALUATION  [x] DAILY NOTE (LAND) [] DAILY NOTE (AQUATIC ) [] PROGRESS NOTE [] DISCHARGE NOTE    [] [address] [facility]   [x] [address]    [] [address] [facility]   [] Biju Polo    Date: 2022  Patient Name:  Olga Lidia Trevizo  : 1934  MRN: [id number]  CSN: [id number]    Referring Practitioner Constance BEYER   Diagnosis Other symptoms and signs involving the musculoskeletal system [R29.898]    Treatment Diagnosis B LE weakness, difficulty walking, decreased balance   Date of Evaluation 8/15/22   Additional Pertinent History MI, HTN, COPD, UTI, kidney stone, obesity, SOB      Functional Outcome Measure Used Bird balance test   Functional Outcome Score Eval  (8/15/22)       Insurance: Primary: Payor: MEDICARE /  /  / ,   Secondary: [employer]   Authorization Information: Pays at 80%, modalities covered except ionto/MHP/CP not covered   Visit # 6, 6/10 for progress note   Visits Allowed: Unlimited visits   Recertification Date:    Physician Follow-Up: 6 month check up   Physician Orders:    History of Present Illness: Gradual increase in weakness in legs and difficulty with mobility. Daughter, who is PT, with patient for eval.  Patient has difficulty with getting in/out of chair- 2-3 attempts from normal chair, with difficulty walking more than room to room, also daughter is wrapping B LE lymphedema/CHF x 3 years. Did have COVID fall , hospitalized x 3 minutes, TCU x 1 week , was on O2 but off x 1.5 years, and increased difficulty breathing, gradual decrease in endurance since then. Difficulty walking, fatigue in legs with walking room to room     SUBJECTIVE:  Patient reporting that he gets his HEP in when he remembers but reporting no other complains currently.        OBJECTIVE:    TREATMENT   Precautions:    Pain:     \"X in shaded column indicates activity completed today    *\" next to exercise/intervention indicates progression   Modalities Parameters/  Location  Notes                     Manual Therapy Time/Technique  Notes                     Exercise/Intervention   Notes   Ankle pump B 15      Heel slide R/L 5x each  x    Quad set 15* 5s count out loud x Counting out loud to breath with reps   SAQ 10 5s x    SLR small movement 8  x    Sitting EOB scapular retraction 15 x elbows bent  x    Backward shoulder circles  Attempted but not understanding direction so held      LAQ EOB 10 5x x Count out loud   NuStep Level 3 6 minutes LE only  x    Sitting heel and toe raises 15 x each  x    Sitting marching 15  x    Sitting EOB knee flexion R/L 10* 5s x             Specific Interventions Next Treatment: NuStep, B LE AROM, stretching, strengthening, sit to stand, transfer education, gait training, balance, endurance , rest break as needed with COPD and exertional SOB    Activity/Treatment Tolerance:  []  Patient tolerated treatment well  [x]  Patient limited by fatigue  []  Patient limited by pain   []  Patient limited by medical complications  []  Other:     Assessment: Patient continues with wide base of support and poor hand placement with transfers. Patient also does get SOB with exercises so minimal progressions made today.     GOALS:  Patient Goal: to walk better    Short Term Goals:  Time Frame: deferred to [facility]'s    Long Term Goals:  Time Frame: 5 weeks  Increase AROM B hip flexion to 50, knee flexion to 100, ankle DF to 5 degrees to allow patient to report able to sit to stand with 1 attempt with no LOB  Increase strength B hip flexion to 4-/5 to allow patient to report able to go up and down steps in/out of house, daughters house without assist and B HR  I with HEP to improve balance with Tinetti balance to 20/28 to reduce risk of fall with patient able to walk x 10 minutes with RW for exercise in house with no LOB    Patient Education:   [x] HEP/Education Completed: continue with HEP. Hand placement with transfers. [employer] Access Code:[email or web address]  []  No new Education completed  [x]  Reviewed Prior HEP      []  Patient verbalized and/or demonstrated understanding of education provided. []  Patient unable to verbalize and/or demonstrate understanding of education provided. Will continue education. []  Barriers to learning: handouts needed    PLAN:  Treatment Recommendations: Strengthening, Range of Motion, Balance Training, Functional Mobility Training, Gait Training, Home Exercise Program, Patient Education, and Safety Education and Training      Plan to see patient 2 times per week for 5 weeks to address the treatment planned outlined above.   [x]  Continue with current plan of care  []  Modify plan of care as follows:    []  Hold pending physician visit  []  Discharge    Time In 1134   Time Out 1159   Timed Code Minutes:  25 min   Total Treatment Time: 25 min       Electronically Signed by: Stephon Atkins PTA

## 2022-09-06 ENCOUNTER — HOSPITAL ENCOUNTER (OUTPATIENT)
Dept: PHYSICAL THERAPY | Age: 87
Setting detail: THERAPIES SERIES
Discharge: HOME | End: 2022-09-06
Payer: MEDICARE

## 2022-09-06 PROCEDURE — 97110 THERAPEUTIC EXERCISES: CPT

## 2022-09-06 NOTE — PROGRESS NOTES
[address] [facility]  PHYSICAL THERAPY  [x] DAILY NOTE (LAND) [] DAILY NOTE (AQUATIC ) [] PROGRESS NOTE [] DISCHARGE NOTE    [] [facility]   [x] [place] [address]    [] [address] [facility]   [] Maame Mendoza    Date: 2022  Patient Name:  Kellie Long  : 1934  MRN: [id number]  CSN: [id number]    Referring Practitioner Jessica BEYER   Diagnosis Other symptoms and signs involving the musculoskeletal system [R29.898]    Treatment Diagnosis B LE weakness, difficulty walking, decreased balance   Date of Evaluation 8/15/22   Additional Pertinent History MI, HTN, COPD, UTI, kidney stone, obesity, SOB      Functional Outcome Measure Used Bird balance test   Functional Outcome Score Eval  (8/15/22)       Insurance: Primary: Payor: MEDICARE /  /  / ,   Secondary: [employer]   Authorization Information: Pays at 80%, modalities covered except ionto/MHP/CP not covered   Visit # 7, 7/10 for progress note   Visits Allowed: Unlimited visits   Recertification Date:    Physician Follow-Up: 6 month check up   Physician Orders:    History of Present Illness: Gradual increase in weakness in legs and difficulty with mobility. Daughter, who is PT, with patient for eval.  Patient has difficulty with getting in/out of chair- 2-3 attempts from normal chair, with difficulty walking more than room to room, also daughter is wrapping B LE lymphedema/CHF x 3 years. Did have COVID fall , hospitalized x 3 minutes, TCU x 1 week , was on O2 but off x 1.5 years, and increased difficulty breathing, gradual decrease in endurance since then. Difficulty walking, fatigue in legs with walking room to room     SUBJECTIVE:  No new complains reported today.       OBJECTIVE:    TREATMENT   Precautions:    Pain:     \"X in shaded column indicates activity completed today    *\" next to exercise/intervention indicates progression Modalities Parameters/  Location  Notes                     Manual Therapy Time/Technique  Notes                     Exercise/Intervention   Notes   Ankle pump B 15      Heel slide R/L 10* each  x    Quad set 15 5s count out loud x Counting out loud to breath with reps   SAQ 15* 5s x    SLR small movement 8  x    Sitting EOB scapular retraction 15 x elbows bent  x    Backward shoulder circles  Attempted but not understanding direction so held      LAQ EOB 15* 5x x Count out loud   NuStep Level 3 5 minutes LE only  x    Sitting heel and toe raises 15 x each  x    Sitting marching 15  x    Sitting EOB knee flexion R/L 10 5s x    Sit to stand from EOB * 5  x      Specific Interventions Next Treatment: NuStep, B LE AROM, stretching, strengthening, sit to stand, transfer education, gait training, balance, endurance , rest break as needed with COPD and exertional SOB    Activity/Treatment Tolerance:  []  Patient tolerated treatment well  [x]  Patient limited by fatigue  []  Patient limited by pain   []  Patient limited by medical complications  []  Other:     Assessment: Progressions made with reps with noted fatigue from patient. Added sit to stand with some diffiuctly    GOALS:  Patient Goal: to walk better    Short Term Goals:  Time Frame: deferred to LTG's    Long Term Goals:  Time Frame: 5 weeks  Increase AROM B hip flexion to 50, knee flexion to 100, ankle DF to 5 degrees to allow patient to report able to sit to stand with 1 attempt with no LOB  Increase strength B hip flexion to 4-/5 to allow patient to report able to go up and down steps in/out of house, daughters house without assist and B HR  I with HEP to improve balance with Tinetti balance to 20/28 to reduce risk of fall with patient able to walk x 10 minutes with RW for exercise in house with no LOB    Patient Education:   [x]  HEP/Education Completed: progressions of reps made.   [place] Access Code:[email or web address]  []  No new Education completed  [x] Reviewed Prior HEP      []  Patient verbalized and/or demonstrated understanding of education provided. []  Patient unable to verbalize and/or demonstrate understanding of education provided. Will continue education. []  Barriers to learning: handouts needed    PLAN:  Treatment Recommendations: Strengthening, Range of Motion, Balance Training, Functional Mobility Training, Gait Training, Home Exercise Program, Patient Education, and Safety Education and Training      Plan to see patient 2 times per week for 5 weeks to address the treatment planned outlined above.   [x]  Continue with current plan of care  []  Modify plan of care as follows:    []  Hold pending physician visit  []  Discharge    Time In 1138   Time Out 1204   Timed Code Minutes:  26min   Total Treatment Time: 26 min       Electronically Signed by: Kellee Richardson PTA

## 2022-09-09 ENCOUNTER — HOSPITAL ENCOUNTER (OUTPATIENT)
Dept: PHYSICAL THERAPY | Age: 87
Setting detail: THERAPIES SERIES
Discharge: HOME | End: 2022-09-09
Payer: MEDICARE

## 2022-09-09 PROCEDURE — 97110 THERAPEUTIC EXERCISES: CPT

## 2022-09-09 NOTE — PROGRESS NOTES
[address] [facility]  PHYSICAL THERAPY  [x] DAILY NOTE (LAND) [] DAILY NOTE (AQUATIC ) [] PROGRESS NOTE [] DISCHARGE NOTE    [] [address] [facility]   [x] [place]     [] [facility]   [] Luis Antonio Nolasco    Date: 2022  Patient Name:  Dusty Newton  : 1934  MRN: [id number]  CSN: [id number]    Referring Practitioner Xavi BEYER   Diagnosis Other symptoms and signs involving the musculoskeletal system [R29.898]    Treatment Diagnosis B LE weakness, difficulty walking, decreased balance   Date of Evaluation 8/15/22   Additional Pertinent History MI, HTN, COPD, UTI, kidney stone, obesity, SOB      Functional Outcome Measure Used Bird balance test   Functional Outcome Score Eval  (8/15/22)       Insurance: Primary: Payor: MEDICARE /  /  / ,   Secondary: [employer]   Authorization Information: Pays at 80%, modalities covered except ionto/MHP/CP not covered   Visit # 8, 8/10 for progress note   Visits Allowed: Unlimited visits   Recertification Date:    Physician Follow-Up: 6 month check up   Physician Orders:    History of Present Illness: Gradual increase in weakness in legs and difficulty with mobility. Daughter, who is PT, with patient for eval.  Patient has difficulty with getting in/out of chair- 2-3 attempts from normal chair, with difficulty walking more than room to room, also daughter is wrapping B LE lymphedema/CHF x 3 years. Did have COVID fall , hospitalized x 3 minutes, TCU x 1 week , was on O2 but off x 1.5 years, and increased difficulty breathing, gradual decrease in endurance since then.    Difficulty walking, fatigue in legs with walking room to room     SUBJECTIVE:  reports feeling that leg a little stronger, easier to get up off couch      OBJECTIVE:    TREATMENT   Precautions: none   Pain: 0/10    \"X in shaded column indicates activity completed today    *\" next to exercise/intervention indicates progression   Modalities Parameters/  Location  Notes                     Manual Therapy Time/Technique  Notes                     Exercise/Intervention   Notes   Ankle pump B 15      Heel slide R/L 10* each      Quad set 15 5s count out loud  Counting out loud to breath with reps   SAQ 15* 5s     SLR small movement 8      Sitting EOB scapular retraction 15 x elbows bent      Backward shoulder circles  Attempted but not understanding direction so held      LAQ EOB 15* 5x x Count out loud   NuStep Level 3 5 minutes LE only  x    Sitting heel and toe raises 15 x each  x    Sitting marching 15  x    Sitting EOB knee flexion R/L 10 5s x    Sit to stand from EOB * 5      Standing step out to side 6 inches 10 x R/L  x    Standing marching 10x  x Sitting rest break   Standing heel raises 10  x    Standing small knee bend 10  x Sitting rest break     Specific Interventions Next Treatment: NuStep, B LE AROM, stretching, strengthening, sit to stand, transfer education, gait training, balance, endurance , rest break as needed with COPD and exertional SOB    Activity/Treatment Tolerance:  []  Patient tolerated treatment well  [x]  Patient limited by fatigue  []  Patient limited by pain   []  Patient limited by medical complications  []  Other:     Assessment: progress to standing after NuStep, chair by steps for 3 sitting rest breaks needed with LBP with standing erect, reviewed 1 more week scheduled and encouraged to do all HEP daily    GOALS:  Patient Goal: to walk better    Short Term Goals:  Time Frame: deferred to LTG's    Long Term Goals:  Time Frame: 5 weeks  Increase AROM B hip flexion to 50, knee flexion to 100, ankle DF to 5 degrees to allow patient to report able to sit to stand with 1 attempt with no LOB  Increase strength B hip flexion to 4-/5 to allow patient to report able to go up and down steps in/out of house, daughters house without assist and B HR  I with HEP to improve balance with Tinetti balance to 20/28 to reduce risk of fall with patient able to walk x 10 minutes with RW for exercise in house with no LOB    Patient Education:   [x]  HEP/Education Completed: standing 1. Step out, 2. Marching 3. Small knee bend  4. Heel raises and chair by step and rest between exercise 2 and 3  []  No new Education completed  [x]  Reviewed Prior HEP      []  Patient verbalized and/or demonstrated understanding of education provided. []  Patient unable to verbalize and/or demonstrate understanding of education provided. Will continue education. []  Barriers to learning: handouts needed    PLAN:  Treatment Recommendations: Strengthening, Range of Motion, Balance Training, Functional Mobility Training, Gait Training, Home Exercise Program, Patient Education, and Safety Education and Training      Plan to see patient 2 times per week for 5 weeks to address the treatment planned outlined above.   [x]  Continue with current plan of care  []  Modify plan of care as follows:    []  Hold pending physician visit  []  Discharge    Time In 1130   Time Out 1200   Timed Code Minutes:  30min   Total Treatment Time: 30min       Electronically Signed by: Alee Malone PT

## 2022-09-14 ENCOUNTER — HOSPITAL ENCOUNTER (OUTPATIENT)
Dept: PHYSICAL THERAPY | Age: 87
Setting detail: THERAPIES SERIES
Discharge: HOME | End: 2022-09-14
Payer: MEDICARE

## 2022-09-14 PROCEDURE — 97110 THERAPEUTIC EXERCISES: CPT

## 2022-09-14 NOTE — PROGRESS NOTES
[address] [facility]  PHYSICAL THERAPY  [x] DAILY NOTE (LAND) [] DAILY NOTE (AQUATIC ) [] PROGRESS NOTE [] DISCHARGE NOTE    [] [address] [facility]   [x] [address]    [] [facility]   [] Sil Clark    Date: 2022  Patient Name:  Mortimer Sickle  : 1934  MRN: [id number]  CSN: [id number]    Referring Practitioner Scot BEYER   Diagnosis Other symptoms and signs involving the musculoskeletal system [R29.898]    Treatment Diagnosis B LE weakness, difficulty walking, decreased balance   Date of Evaluation 8/15/22   Additional Pertinent History MI, HTN, COPD, UTI, kidney stone, obesity, SOB      Functional Outcome Measure Used Bird balance test   Functional Outcome Score Eval  (8/15/22)       Insurance: Primary: Payor: MEDICARE /  /  / ,   Secondary: [employer]   Authorization Information: Pays at 80%, modalities covered except ionto/MHP/CP not covered   Visit # 9, 910 for progress note   Visits Allowed: Unlimited visits   Recertification Date:    Physician Follow-Up: 6 month check up   Physician Orders:    History of Present Illness: Gradual increase in weakness in legs and difficulty with mobility. Daughter, who is PT, with patient for eval.  Patient has difficulty with getting in/out of chair- 2-3 attempts from normal chair, with difficulty walking more than room to room, also daughter is wrapping B LE lymphedema/CHF x 3 years. Did have COVID fall , hospitalized x 3 minutes, TCU x 1 week , was on O2 but off x 1.5 years, and increased difficulty breathing, gradual decrease in endurance since then. Difficulty walking, fatigue in legs with walking room to room     SUBJECTIVE:  States no complaints at this time.  Reports mild compliance with HEP      OBJECTIVE:    TREATMENT   Precautions: none   Pain: 0/10    \"X in shaded column indicates activity completed today    *\" next to exercise/intervention indicates progression   Modalities Parameters/  Location  Notes                     Manual Therapy Time/Technique  Notes                     Exercise/Intervention   Notes   Ankle pump B 15      Heel slide R/L 10* each      Quad set 15 5s count out loud  Counting out loud to breath with reps   SAQ 15* 5s     SLR small movement 8      Sitting EOB scapular retraction 15 x elbows bent  x    Backward shoulder circles  Attempted but not understanding direction so held      LAQ EOB 15 5s x Count out loud   NuStep Level 4* 6* min LE only  x Fatigued after   Sitting heel and toe raises 20* x each  x    Sitting marching 20* alt ea  x    Sitting EOB knee flexion R/L 10 5s x    Sit to stand from EOB * 5      Standing step out to side 6 inches 10 x R/L  x VCs for upright posture, mod fatigue   Standing marching 10x  x Sitting rest break   Standing heel raises 10  x    Standing small knee bend 10  x Sitting rest break     Specific Interventions Next Treatment: NuStep, B LE AROM, stretching, strengthening, sit to stand, transfer education, gait training, balance, endurance , rest break as needed with COPD and exertional SOB    Activity/Treatment Tolerance:  []  Patient tolerated treatment well  [x]  Patient limited by fatigue  []  Patient limited by pain   []  Patient limited by medical complications  []  Other:     Assessment: Pt tolerated session fairly well, continued with standing exs after NuStep with mod fatigue noted. Increased Nustep time and level this date with pt noticeably more fatigued during rest of tx. Progressed seated exs by 5 reps as well. Discussed importance of HEP and Upright posture during seated and standing exs.     GOALS:  Patient Goal: to walk better    Short Term Goals:  Time Frame: deferred to LTG's    Long Term Goals:  Time Frame: 5 weeks  Increase AROM B hip flexion to 50, knee flexion to 100, ankle DF to 5 degrees to allow patient to report able to sit to stand with 1 attempt with no LOB  Increase strength B hip flexion to 4-/5 to allow patient to report able to go up and down steps in/out of house, daughters house without assist and B HR  I with HEP to improve balance with Tinetti balance to 20/28 to reduce risk of fall with patient able to walk x 10 minutes with RW for exercise in house with no LOB    Patient Education:   [x]  HEP/Education Completed: standing 1. Step out, 2. Marching 3. Small knee bend  4. Heel raises and chair by step and rest between exercise 2 and 3  []  No new Education completed  [x]  Reviewed Prior HEP      []  Patient verbalized and/or demonstrated understanding of education provided. []  Patient unable to verbalize and/or demonstrate understanding of education provided. Will continue education. []  Barriers to learning: handouts needed    PLAN:  Treatment Recommendations: Strengthening, Range of Motion, Balance Training, Functional Mobility Training, Gait Training, Home Exercise Program, Patient Education, and Safety Education and Training      Plan to see patient 2 times per week for 5 weeks to address the treatment planned outlined above.   [x]  Continue with current plan of care  []  Modify plan of care as follows:    []  Hold pending physician visit  []  Discharge    Time In 1130   Time Out 1200   Timed Code Minutes:  30min   Total Treatment Time: 30min       Electronically Signed by: Rosalind Barron PTA

## 2022-09-16 ENCOUNTER — HOSPITAL ENCOUNTER (OUTPATIENT)
Dept: PHYSICAL THERAPY | Age: 87
Setting detail: THERAPIES SERIES
Discharge: HOME | End: 2022-09-16
Payer: MEDICARE

## 2022-09-16 PROCEDURE — 97110 THERAPEUTIC EXERCISES: CPT

## 2022-09-16 NOTE — DISCHARGE SUMMARY
[address] [facility]  PHYSICAL THERAPY  [] DAILY NOTE (LAND) [] DAILY NOTE (AQUATIC ) [] PROGRESS NOTE [x] DISCHARGE NOTE    [] [address] [facility]   [x] [place]     [] [facility]   [] Juan Su    Date: 2022  Patient Name:  Pilar Lombardi  : 1934  MRN: [id number]  CSN: [id number]    Referring Practitioner Franci BEYER   Diagnosis Other symptoms and signs involving the musculoskeletal system [R29.898]    Treatment Diagnosis B LE weakness, difficulty walking, decreased balance   Date of Evaluation 8/15/22   Additional Pertinent History MI, HTN, COPD, UTI, kidney stone, obesity, SOB      Functional Outcome Measure Used Bird balance test   Functional Outcome Score Eval  (8/15/22) , discharge       Insurance: Primary: Payor: MEDICARE /  /  / ,   Secondary: [employer]   Authorization Information: Pays at 80%, modalities covered except ionto/MHP/CP not covered   Visit # 10, 10/10 for progress note   Visits Allowed: Unlimited visits   Recertification Date:    Physician Follow-Up: 6 month check up   Physician Orders:    History of Present Illness: Gradual increase in weakness in legs and difficulty with mobility. Daughter, who is PT, with patient for eval.  Patient has difficulty with getting in/out of chair- 2-3 attempts from normal chair, with difficulty walking more than room to room, also daughter is wrapping B LE lymphedema/CHF x 3 years. Did have COVID fall , hospitalized x 3 minutes, TCU x 1 week , was on O2 but off x 1.5 years, and increased difficulty breathing, gradual decrease in endurance since then. Difficulty walking, fatigue in legs with walking room to room     SUBJECTIVE:   patient reports that legs are stronger and improved ease getting on/off chair and toilet.   Patient reports doing standing exercises daily, on and off for supine, seated exercises      OBJECTIVE:    TREATMENT   Precautions: none   Pain: 0/10    \"X in shaded column indicates activity completed today    *\" next to exercise/intervention indicates progression   Modalities Parameters/  Location  Notes                     Manual Therapy Time/Technique  Notes                     Exercise/Intervention   Notes   Ankle pump B 15      Heel slide R/L 10* each      Quad set 15 5s count out loud  Counting out loud to breath with reps   SAQ 15* 5s     SLR small movement 8      Sitting EOB scapular retraction 15 x elbows bent  x    Backward shoulder circles  Attempted but not understanding direction so held      LAQ EOB 15 5s x Count out loud   NuStep Level 4* 6* min LE only  x Fatigued after   Sitting heel and toe raises 20* x each  x    Sitting marching 20* alt ea  x    Sitting EOB knee flexion R/L 10 5s x    Sit to stand from EOB * 5      Standing step out to side 6 inches 10 x R/L  x VCs for upright posture, mod fatigue   Standing marching 10x  x Sitting rest break   Standing heel raises 10  x    Standing small knee bend 10  x Sitting rest break     Specific Interventions Next Treatment: NuStep, B LE AROM, stretching, strengthening, sit to stand, transfer education, gait training, balance, endurance , rest break as needed with COPD and exertional SOB    Activity/Treatment Tolerance:  []  Patient tolerated treatment well  [x]  Patient limited by fatigue  []  Patient limited by pain   []  Patient limited by medical complications  []  Other:     Assessment: patient has thorough HEP of supine, seated, standing balance exercises, education on transfers, ambulation with walking inside walker. Patient still requires cues with walking with walker back but knows what he should do, \"I just don't do it all the time. \"  Son notes patient able to get in/out of car without assistance to and from PT clinic and going to start back to [facility] next week playing cards.   Discharge to [facility]    GOALS:  Patient Goal: to walk better    Short Term Goals:  Time Frame: deferred to LTG's    Long Term Goals:  Time Frame: 5 weeks  Increase AROM B hip flexion to 50, knee flexion to 100, ankle DF to 5 degrees to allow patient to report able to sit to stand with 1 attempt with no LOB. MET WITH R HIP FLEXION 60, L HIP FLEXION 60 DEGREES, R KNEE FLEXION 105 DEGREES, L KNEE FLEXION 100 DEGREES, SIT TO STAND WITH 1 ATTEMPTED  Increase strength B hip flexion to 4-/5 to allow patient to report able to go up and down steps in/out of house, daughters house without assist and B HR.   MET WITH HIP FLEXION 4-/5 WITH IMPROVED EASE UP AND DOWN STEPS  I with HEP to improve balance with Tinetti balance to 20/28 to reduce risk of fall with patient able to walk x 10 minutes with RW for exercise in house with no LOB. MET WITH HEP, TINETTI BALANCE 20/28    Patient Education:   [x]  HEP/Education Completed: standing 1. Step out, 2. Marching 3. Small knee bend  4. Heel raises and chair by step and rest between exercise 2 and 3  []  No new Education completed  [x]  Reviewed Prior HEP      []  Patient verbalized and/or demonstrated understanding of education provided. []  Patient unable to verbalize and/or demonstrate understanding of education provided. Will continue education. []  Barriers to learning: handouts needed    PLAN:  Treatment Recommendations: Strengthening, Range of Motion, Balance Training, Functional Mobility Training, Gait Training, Home Exercise Program, Patient Education, and Safety Education and Training      Plan to see patient 2 times per week for 5 weeks to address the treatment planned outlined above.   []  Continue with current plan of care  []  Modify plan of care as follows:    []  Hold pending physician visit  [x]  Discharge    Time In 1130   Time Out 1200   Timed Code Minutes:  30min   Total Treatment Time: 30min       Electronically Signed by: Blossom Gallego PT

## 2023-02-13 ENCOUNTER — OFFICE VISIT (OUTPATIENT)
Dept: CARDIOLOGY CLINIC | Age: 88
End: 2023-02-13
Payer: MEDICARE

## 2023-02-13 VITALS
DIASTOLIC BLOOD PRESSURE: 66 MMHG | HEART RATE: 60 BPM | WEIGHT: 229.25 LBS | SYSTOLIC BLOOD PRESSURE: 156 MMHG | BODY MASS INDEX: 33.96 KG/M2 | HEIGHT: 69 IN

## 2023-02-13 DIAGNOSIS — I25.83 CORONARY ARTERY DISEASE DUE TO LIPID RICH PLAQUE: Primary | ICD-10-CM

## 2023-02-13 DIAGNOSIS — I25.10 CORONARY ARTERY DISEASE DUE TO LIPID RICH PLAQUE: Primary | ICD-10-CM

## 2023-02-13 DIAGNOSIS — R06.02 SOB (SHORTNESS OF BREATH): ICD-10-CM

## 2023-02-13 PROCEDURE — G8484 FLU IMMUNIZE NO ADMIN: HCPCS | Performed by: INTERNAL MEDICINE

## 2023-02-13 PROCEDURE — 93000 ELECTROCARDIOGRAM COMPLETE: CPT | Performed by: INTERNAL MEDICINE

## 2023-02-13 PROCEDURE — 99214 OFFICE O/P EST MOD 30 MIN: CPT | Performed by: INTERNAL MEDICINE

## 2023-02-13 PROCEDURE — 1123F ACP DISCUSS/DSCN MKR DOCD: CPT | Performed by: INTERNAL MEDICINE

## 2023-02-13 PROCEDURE — 1036F TOBACCO NON-USER: CPT | Performed by: INTERNAL MEDICINE

## 2023-02-13 PROCEDURE — G8417 CALC BMI ABV UP PARAM F/U: HCPCS | Performed by: INTERNAL MEDICINE

## 2023-02-13 PROCEDURE — G8427 DOCREV CUR MEDS BY ELIG CLIN: HCPCS | Performed by: INTERNAL MEDICINE

## 2023-02-13 RX ORDER — ALFUZOSIN HYDROCHLORIDE 10 MG/1
TABLET, EXTENDED RELEASE ORAL
COMMUNITY
Start: 2023-02-08

## 2023-02-13 NOTE — PROGRESS NOTES
Pt here for 1 yr check up     Pt continues with sob , swelling in legs and feet , wraps legs couple times a week

## 2023-02-13 NOTE — PROGRESS NOTES
[address] [place] [address],[place] [address] Anshul Kovacs [address] [address]  Dept: [phone]  Dept Fax: [phone]  Loc: [phone]      Visit Date: 2/13/2023    Mr. Antnoio Lam is a 80 y.o. male  who presented for:  Chief Complaint   Patient presents with    Check-Up    Coronary Artery Disease       HPI:   81 yo M c hx of CAD s/p CABG x3, 2016, hx MI,COPD, HTN, HLD, CHF, TIA is here for a follow up. Denies any chest pain, palpitations, SOB, lightheadedness, dizziness, orthopnea, PND, leg swelling, leg pain. He comes for a follow up.        Current Outpatient Medications:     alfuzosin (UROXATRAL) 10 MG extended release tablet, TAKE 1 TABLET BY MOUTH ONCE DAILY, Disp: , Rfl:     metoprolol tartrate (LOPRESSOR) 50 MG tablet, TAKE 1 TABLET BY MOUTH 2 TIMES DAILY, Disp: 180 tablet, Rfl: 3    potassium chloride (KLOR-CON M) 20 MEQ TBCR extended release tablet, Take 1 tablet by mouth once for 1 dose, Disp: 30 tablet, Rfl: 6    aspirin 325 MG tablet, Take 1 tablet by mouth daily, Disp: , Rfl:     vitamin C (VITAMIN C) 1000 MG tablet, Take 1 tablet by mouth daily, Disp: , Rfl:     Lactobacillus (ACIDOPHILUS/PECTIN PO), Take by mouth, Disp: , Rfl:     Multiple Vitamins-Minerals (THERAPEUTIC MULTIVITAMIN-MINERALS) tablet, Take 1 tablet by mouth daily, Disp: , Rfl:     Cholecalciferol (VITAMIN D PO), Take 1,000 Units by mouth 2 times daily Vit D3, Disp: , Rfl:     furosemide (LASIX) 40 MG tablet, Take 1 tablet by mouth daily, Disp: 30 tablet, Rfl: 3    atorvastatin (LIPITOR) 20 MG tablet, Take 1 tablet by mouth nightly, Disp: 30 tablet, Rfl: 3    finasteride (PROSCAR) 5 MG tablet, Take 5 mg by mouth daily, Disp: , Rfl:     Past Medical History  Reed Stallworth  has a past medical history of Acute on chronic diastolic CHF (congestive heart failure), NYHA class 2 (HCC), Arthritis, Atrial flutter (HCC), BPH (benign prostatic hyperplasia), CAD (coronary artery disease), Cancer ([facility] CKD (chronic kidney disease) stage 3, GFR 30-59 ml/min ([facility] COPD with exacerbation ([facility] Hyperlipidemia, Hypertension, NSTEMI (non-ST elevated myocardial infarction) ([facility] Obesity (BMI 35.0-39.9 without comorbidity), ARGENIS (obstructive sleep apnea), S/P CABG x 3, S/P cardiac cath: 6/20/2016: Tight LM lesion estimated at 90% along with the ostium of LAD. The ostium of the LCX seems to be significantly narrowed as well. 90% mid-RCA and 100% distal RCA, and TIA (transient ischemic attack). Social History  Summer Diaz  reports that he quit smoking about 73 years ago. His smoking use included cigarettes. He started smoking about 76 years ago. He has a 7.50 pack-year smoking history. He has never used smokeless tobacco. He reports current alcohol use of about 2.0 standard drinks per week. He reports that he does not use drugs. Family History  Summer Diaz family history includes Cancer in his father. Past Surgical History   Past Surgical History:   Procedure Laterality Date    CARDIAC SURGERY  06/2016    triple by pass    CYSTOSCOPY  04/20/2017    Right Ureteroscopy, Basket Retrieval of Stones, Stent - Dr. Paula Beckford  2009??    bilateral cataracts    MOHS SURGERY Left 01/26/2018    Mohs Repair BCC Left [place]     MO UNLISTED PROCEDURE EXTERNAL EAR Left 1/26/2018    MOHS REPAIR BCC LEFT EARLOBE performed by Sage Hernandez MD at [place] [address]         Subjective:     REVIEW OF SYSTEMS  Constitutional: denies sweats, chills and fever  HENT: denies  congestion, sinus pressure, sneezing and sore throat. Eyes: denies  pain, discharge, redness and itching. Respiratory: denies apnea, cough  Gastrointestinal: denies blood in stool, constipation, diarrhea   Endocrine: denies cold intolerance, heat intolerance, polydipsia. Genitourinary: denies dysuria, enuresis, flank pain and hematuria. Musculoskeletal: denies arthralgias, joint swelling and neck pain.    Neurological: denies numbness and headaches. Psychiatric/Behavioral: denies agitation, confusion, decreased concentration and dysphoric mood    All others reviewed and are negative. Objective:     BP (!) 156/66   Pulse 60   Ht 5' 9\" (1.753 m)   Wt 229 lb 4 oz (104 kg)   BMI 33.85 kg/m²     Wt Readings from Last 3 Encounters:   02/13/23 229 lb 4 oz (104 kg)   02/21/22 253 lb (114.8 kg)   04/16/21 250 lb (113.4 kg)     BP Readings from Last 3 Encounters:   02/13/23 (!) 156/66   02/21/22 118/64   04/16/21 138/72       PHYSICAL EXAM  Constitutional: Oriented to person, place, and time. Appears well-developed and well-nourished. HENT:   Head: Normocephalic and atraumatic. Eyes: EOM are normal. Pupils are equal, round, and reactive to light. Neck: Normal range of motion. Neck supple. No JVD present. Cardiovascular: Normal rate, normal heart sounds and intact distal pulses. Pulmonary/Chest: Effort normal and breath sounds normal. No respiratory distress. No wheezes. No rales. Abdominal: Soft. Bowel sounds are normal. No distension. There is no tenderness. Musculoskeletal: Normal range of motion. Bilateral lower extremity edema. Neurological: Alert and oriented to person, place, and time. No cranial nerve deficit. Coordination normal.   Skin: Skin is warm and dry. Psychiatric: Normal mood and affect.        No results found for: CKTOTAL, CKMB, CKMBINDEX    Lab Results   Component Value Date/Time    WBC 6.0 11/16/2020 07:39 AM    RBC 3.98 11/16/2020 07:39 AM    HGB 12.8 11/16/2020 07:39 AM    HCT 39.8 11/16/2020 07:39 AM    .0 11/16/2020 07:39 AM    MCH 32.2 11/16/2020 07:39 AM    MCHC 32.2 11/16/2020 07:39 AM    RDW 14.6 03/22/2017 04:25 AM     11/16/2020 07:39 AM    MPV 9.4 11/16/2020 07:39 AM       Lab Results   Component Value Date/Time     11/16/2020 07:39 AM    K 5.0 11/16/2020 07:39 AM    K 4.6 11/05/2020 04:17 AM    CL 99 11/16/2020 07:39 AM    CO2 28 11/16/2020 07:39 AM    BUN 19 11/16/2020 07:39 AM LABALBU 2.8 11/05/2020 04:17 AM    CREATININE 1.1 11/16/2020 07:39 AM    CALCIUM 9.2 11/16/2020 07:39 AM    LABGLOM 63 11/16/2020 07:39 AM    GLUCOSE 109 11/16/2020 07:39 AM       Lab Results   Component Value Date/Time    ALKPHOS 59 11/05/2020 04:17 AM    ALT 21 11/05/2020 04:17 AM    AST 33 11/05/2020 04:17 AM    PROT 6.3 11/05/2020 04:17 AM    BILITOT 0.3 11/05/2020 04:17 AM    BILIDIR <0.2 03/19/2017 10:29 AM    LABALBU 2.8 11/05/2020 04:17 AM       Lab Results   Component Value Date/Time    MG 2.1 11/04/2020 02:05 PM       Lab Results   Component Value Date    INR 1.09 11/05/2020    INR 0.98 06/01/2017    INR 1.52 (H) 03/20/2017         Lab Results   Component Value Date/Time    LABA1C 5.1 06/15/2016 03:21 AM       Lab Results   Component Value Date/Time    TRIG 77 06/20/2016 04:25 AM    HDL 48 06/20/2016 04:25 AM    LDLCALC 34 06/20/2016 04:25 AM       Lab Results   Component Value Date/Time    TSH 2.340 11/04/2020 02:05 PM         Testing Reviewed:      I have individually reviewed the below cardiac tests    EKG:     ECHO:   Results for orders placed during the hospital encounter of 04/19/19   ECHO Complete 2D W Doppler W Color    Narrative Transthoracic Echocardiography Report (TTE)     Demographics      Patient Name    Td Tran Gender                Male      MR #            [id number]       Race                                                        Ethnicity      Account #       [de-identified]       Room Number      Accession       [id number]       Date of Study         04/19/2019   Number      Date of Birth   1934      Referring Physician   Anjana Frank Nephew, CNP      Age             80 year(s)      Shanon Brink RDCS                                      Interpreting          Dario Enamorado MD                                   Physician     Procedure    Type of Study      TTE procedure:ECHOCARDIOGRAM COMPLETE 2D W DOPPLER W COLOR. Procedure Date  Date: 04/19/2019 Start: 10:42 AM    Study Location: Echo Lab  Technical Quality: Limited visualization due to body habitus. Indications:Cardiomyopathy. Additional Medical History:Chronic kidney disease, NSTEMI, Obstructive sleep  apnea, Coronary artery disease, CABG, Transient ischemic attack,  Hypertension, Hyperlipidemia, Aflutter, Congestive heart failure    Patient Status: Routine    Height: 68 inches Weight: 258.01 pounds BSA: 2.28 m^2 BMI: 39.23 kg/m^2    BP: 138/78 mmHg    Allergies    - No Known Allergies. Conclusions      Summary   Technically difficult examination. Left ventricular size is normal and systolic function is mildly reduced. Ejection fraction was estimated at 40-45%. LV wall thickness is within   normal limits. Trace mitral regurgitation is present. The right ventricular size was normal with normal systolic function and   wall thickness. Trivial tricuspid regurgitation visualized. Signature      ----------------------------------------------------------------   Electronically signed by Allyn Jasso MD (Interpreting   physician) on 04/19/2019 at 05:13 PM   ----------------------------------------------------------------      Findings      Mitral Valve   The mitral valve was not well visualized . Trace mitral regurgitation is present. Aortic Valve   The aortic valve appears trileaflet with normal thickness and leaflet   excursion. DOPPLER: Transaortic velocity was within the normal range with   no evidence of aortic stenosis. There was no evidence of aortic   regurgitation. Tricuspid Valve   Tricuspid valve was not well visualized. Trivial tricuspid regurgitation visualized. Pulmonic Valve   The pulmonic valve was not well visualized . Left Atrium   Left atrial size was normal.      Left Ventricle   Left ventricular size is normal and systolic function is mildly reduced.    Ejection fraction was estimated at 40-45%. LV wall thickness is within   normal limits. Right Atrium   Right atrial size was normal.      Right Ventricle   The right ventricular size was normal with normal systolic function and   wall thickness. Pericardial Effusion   The pericardium was normal in appearance with no evidence of a pericardial   effusion. Pleural Effusion   No evidence of pleural effusion. Aorta / Great Vessels   -Aortic root dimension within normal limits.   -The Pulmonary artery is within normal limits. -IVC size is within normal limits with normal respiratory phasic changes.      M-Mode/2D Measurements & Calculations      LV Diastolic    LV Systolic Dimension: 4.3  AV Cusp Separation: 1.7 cmLA   Dimension: 5.4  cm                          Dimension: 4.2 cmAO Root   cm              LV Volume Diastolic: 379 ml Dimension: 3.6 cm   LV FS:20.4 %    LV Volume Systolic: 11.9 ml   LV PW           LV EDV/LV EDV Index: 841   Diastolic: 1.1  OK/34 O^9ZN ESV/LV ESV   cm              Index: 79.5 ml/35 m^2       RV Diastolic Dimension: 3.5 cm   Septum          EF Calculated: 55.0 %   Diastolic: 0.9                              LA/Aorta: 1.17   cm     Doppler Measurements & Calculations      MV Peak E-Wave: 60 cm/s    AV Peak Velocity: 111 LVOT Peak Velocity: 88.1   MV Peak A-Wave: 95.6 cm/s  cm/s                  cm/s   MV E/A Ratio: 0.63         AV Peak Gradient:     LVOT Peak Gradient: 3   MV Peak Gradient: 1.44     4.93 mmHg             mmHg   mmHg      MV Deceleration Time: 261   msec                                             TR Velocity:244 cm/s                                                    TR Gradient:23.81 mmHg                                                    PV Peak Velocity: 95.4   MV E' Septal Velocity: 5.7                       cm/s   cm/s                       AV DVI (Vmax):0.79    PV Peak Gradient: 3.64   MV A' Septal Velocity:                           mmHg   10.2 cm/s   MV E' Lateral Velocity:   9.5 cm/s   MV A' Lateral Velocity:   11.9 cm/s   E/E' septal: 10.53   E/E' lateral: 6.32     [email or web address] [id number]       STRESS:    CATH:    Assessment/Plan       Diagnosis Orders   1. Coronary artery disease due to lipid rich plaque  EKG 12 lead      2. SOB (shortness of breath)  EKG 12 lead            CAD s/p CABG  Lymphedema  TIA  COPD  HTN  HLD  Chronic CHF EF 40-45%    EKG shows Sinus rhythm sinus arrhythmias, RBBB, LAFB  Getting lympedema therapy  Daughter wraps legs for him every 3rd day  Denies orthopnea  No real heart failure symptoms. Present with son  EF 40-45%  Denies any symptoms at present  Reports that he feels good  BP well controlled  Continue Aspirin, statin, metoprolol, lasix  The patient is asked to make an attempt to improve diet and exercise patterns to aid in medical management of this problem. Advised patient to call office or seek immediate medical attention if there is any new onset of  any chest pain, sob, palpitations, lightheadedness, dizziness, orthopnea, PND or pedal edema. Thank you for allowing me to participate in the care of this patient. Please do not hesitate to contact me for any further questions. Return in about 1 year (around 2/13/2024), or if symptoms worsen or fail to improve, for Regular follow up, Review testing.        Electronically signed by Rabia Farias MD [facility]  2/13/2023 at 11:07 AM 1 pair

## 2024-02-26 ENCOUNTER — OFFICE VISIT (OUTPATIENT)
Dept: CARDIOLOGY CLINIC | Age: 89
End: 2024-02-26
Payer: MEDICARE

## 2024-02-26 VITALS
WEIGHT: 250 LBS | HEIGHT: 69 IN | DIASTOLIC BLOOD PRESSURE: 78 MMHG | SYSTOLIC BLOOD PRESSURE: 161 MMHG | BODY MASS INDEX: 37.03 KG/M2 | HEART RATE: 66 BPM

## 2024-02-26 DIAGNOSIS — I25.83 CORONARY ARTERY DISEASE DUE TO LIPID RICH PLAQUE: Primary | ICD-10-CM

## 2024-02-26 DIAGNOSIS — I25.10 CORONARY ARTERY DISEASE DUE TO LIPID RICH PLAQUE: Primary | ICD-10-CM

## 2024-02-26 DIAGNOSIS — R06.02 SOB (SHORTNESS OF BREATH): ICD-10-CM

## 2024-02-26 PROCEDURE — G8427 DOCREV CUR MEDS BY ELIG CLIN: HCPCS | Performed by: INTERNAL MEDICINE

## 2024-02-26 PROCEDURE — 99214 OFFICE O/P EST MOD 30 MIN: CPT | Performed by: INTERNAL MEDICINE

## 2024-02-26 PROCEDURE — 4004F PT TOBACCO SCREEN RCVD TLK: CPT | Performed by: INTERNAL MEDICINE

## 2024-02-26 PROCEDURE — 1123F ACP DISCUSS/DSCN MKR DOCD: CPT | Performed by: INTERNAL MEDICINE

## 2024-02-26 PROCEDURE — G8417 CALC BMI ABV UP PARAM F/U: HCPCS | Performed by: INTERNAL MEDICINE

## 2024-02-26 PROCEDURE — G8484 FLU IMMUNIZE NO ADMIN: HCPCS | Performed by: INTERNAL MEDICINE

## 2024-02-26 PROCEDURE — 93000 ELECTROCARDIOGRAM COMPLETE: CPT | Performed by: INTERNAL MEDICINE

## 2024-02-26 NOTE — PROGRESS NOTES
mmHg   10.2 cm/s   MV E' Lateral Velocity:   9.5 cm/s   MV A' Lateral Velocity:   11.9 cm/s   E/E' septal: 10.53   E/E' lateral: 6.32     [id number]       STRESS:    CATH:    Assessment/Plan       Diagnosis Orders   1. Coronary artery disease due to lipid rich plaque  EKG 12 Lead      2. SOB (shortness of breath)  EKG 12 Lead            CAD s/p CABG  Lymphedema  TIA  COPD  HTN  HLD  Chronic CHF EF 40-45%    EKG shows Sinus rhythm sinus arrhythmias, RBBB, LAFB  Getting lympedema therapy  Daughter wraps legs for him twice weekly  Denies orthopnea, does sleep in recliner  Gets around the house with a walker  No real heart failure symptoms.   Present with son  EF 40-45%  Denies any symptoms at present  Reports that he feels good  BP well controlled  Continue Aspirin, statin, metoprolol, lasix  The patient is asked to make an attempt to improve diet and exercise patterns to aid in medical management of this problem.  Advised patient to call office or seek immediate medical attention if there is any new onset of  any chest pain, sob, palpitations, lightheadedness, dizziness, orthopnea, PND or pedal edema.     Thank you for allowing me to participate in the care of this patient.   Please do not hesitate to contact me for any further questions.     Return in about 1 year (around 2/26/2025), or if symptoms worsen or fail to improve, for Review testing, Regular follow up.       Electronically signed by Cullen Mercado MD [facility]  2/26/2024 at 11:07 AM

## 2025-02-17 ENCOUNTER — OFFICE VISIT (OUTPATIENT)
Dept: CARDIOLOGY CLINIC | Age: 89
End: 2025-02-17
Payer: MEDICARE

## 2025-02-17 VITALS
HEART RATE: 71 BPM | DIASTOLIC BLOOD PRESSURE: 67 MMHG | SYSTOLIC BLOOD PRESSURE: 134 MMHG | HEIGHT: 69 IN | WEIGHT: 250 LBS | BODY MASS INDEX: 37.03 KG/M2

## 2025-02-17 DIAGNOSIS — I25.810 CORONARY ARTERY DISEASE INVOLVING CORONARY BYPASS GRAFT OF NATIVE HEART WITHOUT ANGINA PECTORIS: Primary | ICD-10-CM

## 2025-02-17 PROCEDURE — 4004F PT TOBACCO SCREEN RCVD TLK: CPT | Performed by: INTERNAL MEDICINE

## 2025-02-17 PROCEDURE — G8417 CALC BMI ABV UP PARAM F/U: HCPCS | Performed by: INTERNAL MEDICINE

## 2025-02-17 PROCEDURE — 93000 ELECTROCARDIOGRAM COMPLETE: CPT | Performed by: INTERNAL MEDICINE

## 2025-02-17 PROCEDURE — 1123F ACP DISCUSS/DSCN MKR DOCD: CPT | Performed by: INTERNAL MEDICINE

## 2025-02-17 PROCEDURE — G8427 DOCREV CUR MEDS BY ELIG CLIN: HCPCS | Performed by: INTERNAL MEDICINE

## 2025-02-17 PROCEDURE — 1159F MED LIST DOCD IN RCRD: CPT | Performed by: INTERNAL MEDICINE

## 2025-02-17 PROCEDURE — 99214 OFFICE O/P EST MOD 30 MIN: CPT | Performed by: INTERNAL MEDICINE

## 2025-02-17 NOTE — PROGRESS NOTES
[facility] PROFESSIONAL [facility] CARDIOLOGY  [address]  [address]  [facility] [address]  Dept: [phone]  Dept Fax: [phone]  Loc: [phone]      Visit Date: 2/17/2025    Mr. Ochoa is a 89 y.o. male  who presented for:  Chief Complaint   Patient presents with    1 Year Follow Up    Coronary Artery Disease       HPI:   89 yo M c hx of CAD s/p CABG x3, 2016, hx MI,COPD, HTN, HLD, CHF, TIA is here for a follow up. Denies any chest pain, palpitations, SOB, lightheadedness, dizziness, orthopnea, PND, leg swelling, leg pain. He comes for a follow up.       Current Outpatient Medications:     alfuzosin (UROXATRAL) 10 MG extended release tablet, TAKE 1 TABLET BY MOUTH ONCE DAILY, Disp: , Rfl:     metoprolol tartrate (LOPRESSOR) 50 MG tablet, TAKE 1 TABLET BY MOUTH 2 TIMES DAILY, Disp: 180 tablet, Rfl: 3    potassium chloride (KLOR-CON M) 20 MEQ TBCR extended release tablet, Take 1 tablet by mouth once for 1 dose, Disp: 30 tablet, Rfl: 6    aspirin 325 MG tablet, Take 1 tablet by mouth daily, Disp: , Rfl:     vitamin C (VITAMIN C) 1000 MG tablet, Take 1 tablet by mouth daily, Disp: , Rfl:     Lactobacillus (ACIDOPHILUS/PECTIN PO), Take by mouth, Disp: , Rfl:     Multiple Vitamins-Minerals (THERAPEUTIC MULTIVITAMIN-MINERALS) tablet, Take 1 tablet by mouth daily, Disp: , Rfl:     Cholecalciferol (VITAMIN D PO), Take 1,000 Units by mouth 2 times daily Vit D3, Disp: , Rfl:     furosemide (LASIX) 40 MG tablet, Take 1 tablet by mouth daily, Disp: 30 tablet, Rfl: 3    atorvastatin (LIPITOR) 20 MG tablet, Take 1 tablet by mouth nightly, Disp: 30 tablet, Rfl: 3    finasteride (PROSCAR) 5 MG tablet, Take 1 tablet by mouth daily, Disp: , Rfl:     Past Medical History  Walter  has a past medical history of Acute on chronic diastolic CHF (congestive heart failure), NYHA class 2 (HCC), Arthritis, Atrial flutter (HCC), BPH (benign prostatic hyperplasia), CAD (coronary artery disease), Cancer (HCC),

## 2025-02-17 NOTE — PROGRESS NOTES
1 year follow up.    EKG done today.    Denies cp, sob, palpitations, dizziness, lightheaded.    C/O JENNIFER.

## 2025-06-19 ENCOUNTER — APPOINTMENT (OUTPATIENT)
Dept: GENERAL RADIOLOGY | Age: 89
DRG: 177 | End: 2025-06-19
Payer: MEDICARE

## 2025-06-19 ENCOUNTER — APPOINTMENT (OUTPATIENT)
Dept: INTERVENTIONAL RADIOLOGY/VASCULAR | Age: 89
DRG: 177 | End: 2025-06-19
Payer: MEDICARE

## 2025-06-19 ENCOUNTER — HOSPITAL ENCOUNTER (INPATIENT)
Age: 89
LOS: 4 days | Discharge: NURSING, REHAB OR LONG-TERM CARE | DRG: 177 | End: 2025-06-23
Attending: EMERGENCY MEDICINE | Admitting: STUDENT IN AN ORGANIZED HEALTH CARE EDUCATION/TRAINING PROGRAM
Payer: MEDICARE

## 2025-06-19 DIAGNOSIS — N40.1 BENIGN PROSTATIC HYPERPLASIA WITH URINARY RETENTION: ICD-10-CM

## 2025-06-19 DIAGNOSIS — R33.8 ACUTE URINARY RETENTION: ICD-10-CM

## 2025-06-19 DIAGNOSIS — J18.9 PNEUMONIA OF BOTH LOWER LOBES DUE TO INFECTIOUS ORGANISM: ICD-10-CM

## 2025-06-19 DIAGNOSIS — R26.2 AMBULATORY DYSFUNCTION: Primary | ICD-10-CM

## 2025-06-19 DIAGNOSIS — R33.8 BENIGN PROSTATIC HYPERPLASIA WITH URINARY RETENTION: ICD-10-CM

## 2025-06-19 PROBLEM — R53.81 PHYSICAL DEBILITY: Status: ACTIVE | Noted: 2025-06-19

## 2025-06-19 LAB
ALBUMIN SERPL BCG-MCNC: 3.3 G/DL (ref 3.4–4.9)
ALP SERPL-CCNC: 73 U/L (ref 40–129)
ALT SERPL W/O P-5'-P-CCNC: 13 U/L (ref 10–50)
ANION GAP SERPL CALC-SCNC: 9 MEQ/L (ref 8–16)
AST SERPL-CCNC: 23 U/L (ref 10–50)
BASOPHILS ABSOLUTE: 0.1 THOU/MM3 (ref 0–0.1)
BASOPHILS NFR BLD AUTO: 0.8 %
BILIRUB CONJ SERPL-MCNC: 0.2 MG/DL (ref 0–0.2)
BILIRUB SERPL-MCNC: 0.4 MG/DL (ref 0.3–1.2)
BILIRUB UR QL STRIP.AUTO: NEGATIVE
BUN SERPL-MCNC: 23 MG/DL (ref 8–23)
CALCIUM SERPL-MCNC: 9.3 MG/DL (ref 8.2–9.6)
CHARACTER UR: CLEAR
CHLORIDE SERPL-SCNC: 107 MEQ/L (ref 98–111)
CO2 SERPL-SCNC: 30 MEQ/L (ref 22–29)
COLOR, UA: YELLOW
CREAT SERPL-MCNC: 1.2 MG/DL (ref 0.7–1.2)
DEPRECATED RDW RBC AUTO: 53.7 FL (ref 35–45)
EKG ATRIAL RATE: 84 BPM
EKG P AXIS: 45 DEGREES
EKG P-R INTERVAL: 200 MS
EKG Q-T INTERVAL: 400 MS
EKG QRS DURATION: 140 MS
EKG QTC CALCULATION (BAZETT): 472 MS
EKG R AXIS: -56 DEGREES
EKG T AXIS: 55 DEGREES
EKG VENTRICULAR RATE: 84 BPM
EOSINOPHIL NFR BLD AUTO: 2.4 %
EOSINOPHILS ABSOLUTE: 0.2 THOU/MM3 (ref 0–0.4)
ERYTHROCYTE [DISTWIDTH] IN BLOOD BY AUTOMATED COUNT: 13.5 % (ref 11.5–14.5)
GFR SERPL CREATININE-BSD FRML MDRD: 57 ML/MIN/1.73M2
GLUCOSE SERPL-MCNC: 100 MG/DL (ref 74–109)
GLUCOSE UR QL STRIP.AUTO: NEGATIVE MG/DL
HCT VFR BLD AUTO: 35.1 % (ref 42–52)
HGB BLD-MCNC: 11 GM/DL (ref 14–18)
HGB UR QL STRIP.AUTO: NEGATIVE
IMM GRANULOCYTES # BLD AUTO: 0.02 THOU/MM3 (ref 0–0.07)
IMM GRANULOCYTES NFR BLD AUTO: 0.3 %
KETONES UR QL STRIP.AUTO: NEGATIVE
LIPASE SERPL-CCNC: 21 U/L (ref 13–60)
LYMPHOCYTES ABSOLUTE: 1.2 THOU/MM3 (ref 1–4.8)
LYMPHOCYTES NFR BLD AUTO: 15.7 %
MAGNESIUM SERPL-MCNC: 2.4 MG/DL (ref 1.6–2.6)
MCH RBC QN AUTO: 33.2 PG (ref 26–33)
MCHC RBC AUTO-ENTMCNC: 31.3 GM/DL (ref 32.2–35.5)
MCV RBC AUTO: 106 FL (ref 80–94)
MONOCYTES ABSOLUTE: 0.8 THOU/MM3 (ref 0.4–1.3)
MONOCYTES NFR BLD AUTO: 10.4 %
NEUTROPHILS ABSOLUTE: 5.6 THOU/MM3 (ref 1.8–7.7)
NEUTROPHILS NFR BLD AUTO: 70.4 %
NITRITE UR QL STRIP: NEGATIVE
NRBC BLD AUTO-RTO: 0 /100 WBC
NT-PROBNP SERPL IA-MCNC: 991 PG/ML (ref 0–449)
OSMOLALITY SERPL CALC.SUM OF ELEC: 294.3 MOSMOL/KG (ref 275–300)
PH UR STRIP.AUTO: 5.5 [PH] (ref 5–9)
PLATELET # BLD AUTO: 237 THOU/MM3 (ref 130–400)
PMV BLD AUTO: 10.2 FL (ref 9.4–12.4)
POTASSIUM SERPL-SCNC: 4.6 MEQ/L (ref 3.5–5.2)
PROT SERPL-MCNC: 6.6 G/DL (ref 6.4–8.3)
PROT UR STRIP.AUTO-MCNC: NEGATIVE MG/DL
RBC # BLD AUTO: 3.31 MILL/MM3 (ref 4.7–6.1)
SODIUM SERPL-SCNC: 146 MEQ/L (ref 135–145)
SP GR UR REFRACT.AUTO: 1.01 (ref 1–1.03)
TROPONIN, HIGH SENSITIVITY: 26 NG/L (ref 0–12)
TROPONIN, HIGH SENSITIVITY: 28 NG/L (ref 0–12)
TSH SERPL DL<=0.05 MIU/L-ACNC: 3.01 UIU/ML (ref 0.27–4.2)
UROBILINOGEN, URINE: 0.2 EU/DL (ref 0–1)
WBC # BLD AUTO: 7.9 THOU/MM3 (ref 4.8–10.8)
WBC #/AREA URNS HPF: NEGATIVE /[HPF]

## 2025-06-19 PROCEDURE — 99223 1ST HOSP IP/OBS HIGH 75: CPT | Performed by: STUDENT IN AN ORGANIZED HEALTH CARE EDUCATION/TRAINING PROGRAM

## 2025-06-19 PROCEDURE — 36415 COLL VENOUS BLD VENIPUNCTURE: CPT

## 2025-06-19 PROCEDURE — 2500000003 HC RX 250 WO HCPCS

## 2025-06-19 PROCEDURE — 83735 ASSAY OF MAGNESIUM: CPT

## 2025-06-19 PROCEDURE — 93971 EXTREMITY STUDY: CPT

## 2025-06-19 PROCEDURE — 74018 RADEX ABDOMEN 1 VIEW: CPT

## 2025-06-19 PROCEDURE — 80076 HEPATIC FUNCTION PANEL: CPT

## 2025-06-19 PROCEDURE — 81003 URINALYSIS AUTO W/O SCOPE: CPT

## 2025-06-19 PROCEDURE — 83880 ASSAY OF NATRIURETIC PEPTIDE: CPT

## 2025-06-19 PROCEDURE — 6360000002 HC RX W HCPCS

## 2025-06-19 PROCEDURE — 2580000003 HC RX 258

## 2025-06-19 PROCEDURE — 72170 X-RAY EXAM OF PELVIS: CPT

## 2025-06-19 PROCEDURE — 85025 COMPLETE CBC W/AUTO DIFF WBC: CPT

## 2025-06-19 PROCEDURE — 99285 EMERGENCY DEPT VISIT HI MDM: CPT

## 2025-06-19 PROCEDURE — 93010 ELECTROCARDIOGRAM REPORT: CPT | Performed by: INTERNAL MEDICINE

## 2025-06-19 PROCEDURE — 84443 ASSAY THYROID STIM HORMONE: CPT

## 2025-06-19 PROCEDURE — 96375 TX/PRO/DX INJ NEW DRUG ADDON: CPT

## 2025-06-19 PROCEDURE — 1200000000 HC SEMI PRIVATE

## 2025-06-19 PROCEDURE — 93005 ELECTROCARDIOGRAM TRACING: CPT

## 2025-06-19 PROCEDURE — 71045 X-RAY EXAM CHEST 1 VIEW: CPT

## 2025-06-19 PROCEDURE — 2500000003 HC RX 250 WO HCPCS: Performed by: STUDENT IN AN ORGANIZED HEALTH CARE EDUCATION/TRAINING PROGRAM

## 2025-06-19 PROCEDURE — 6370000000 HC RX 637 (ALT 250 FOR IP): Performed by: STUDENT IN AN ORGANIZED HEALTH CARE EDUCATION/TRAINING PROGRAM

## 2025-06-19 PROCEDURE — 6360000002 HC RX W HCPCS: Performed by: STUDENT IN AN ORGANIZED HEALTH CARE EDUCATION/TRAINING PROGRAM

## 2025-06-19 PROCEDURE — 84484 ASSAY OF TROPONIN QUANT: CPT

## 2025-06-19 PROCEDURE — 83690 ASSAY OF LIPASE: CPT

## 2025-06-19 PROCEDURE — 80048 BASIC METABOLIC PNL TOTAL CA: CPT

## 2025-06-19 PROCEDURE — 96374 THER/PROPH/DIAG INJ IV PUSH: CPT

## 2025-06-19 RX ORDER — ONDANSETRON 2 MG/ML
4 INJECTION INTRAMUSCULAR; INTRAVENOUS EVERY 6 HOURS PRN
Status: DISCONTINUED | OUTPATIENT
Start: 2025-06-19 | End: 2025-06-23 | Stop reason: HOSPADM

## 2025-06-19 RX ORDER — METOPROLOL TARTRATE 50 MG
50 TABLET ORAL 2 TIMES DAILY
Status: DISCONTINUED | OUTPATIENT
Start: 2025-06-19 | End: 2025-06-23 | Stop reason: HOSPADM

## 2025-06-19 RX ORDER — POLYETHYLENE GLYCOL 3350 17 G/17G
17 POWDER, FOR SOLUTION ORAL DAILY PRN
Status: DISCONTINUED | OUTPATIENT
Start: 2025-06-19 | End: 2025-06-23 | Stop reason: HOSPADM

## 2025-06-19 RX ORDER — ACETAMINOPHEN 650 MG/1
650 SUPPOSITORY RECTAL EVERY 6 HOURS PRN
Status: DISCONTINUED | OUTPATIENT
Start: 2025-06-19 | End: 2025-06-23 | Stop reason: HOSPADM

## 2025-06-19 RX ORDER — SENNA AND DOCUSATE SODIUM 50; 8.6 MG/1; MG/1
2 TABLET, FILM COATED ORAL 2 TIMES DAILY
Status: DISCONTINUED | OUTPATIENT
Start: 2025-06-19 | End: 2025-06-21

## 2025-06-19 RX ORDER — BISACODYL 5 MG/1
5 TABLET, DELAYED RELEASE ORAL DAILY
Status: DISCONTINUED | OUTPATIENT
Start: 2025-06-19 | End: 2025-06-21

## 2025-06-19 RX ORDER — FUROSEMIDE 40 MG/1
40 TABLET ORAL DAILY
Status: DISCONTINUED | OUTPATIENT
Start: 2025-06-19 | End: 2025-06-23 | Stop reason: HOSPADM

## 2025-06-19 RX ORDER — FUROSEMIDE 10 MG/ML
40 INJECTION INTRAMUSCULAR; INTRAVENOUS 2 TIMES DAILY
Status: COMPLETED | OUTPATIENT
Start: 2025-06-19 | End: 2025-06-20

## 2025-06-19 RX ORDER — POTASSIUM CHLORIDE 750 MG/1
20 TABLET, EXTENDED RELEASE ORAL ONCE
Status: DISCONTINUED | OUTPATIENT
Start: 2025-06-19 | End: 2025-06-19

## 2025-06-19 RX ORDER — ACETAMINOPHEN 325 MG/1
650 TABLET ORAL EVERY 6 HOURS PRN
Status: DISCONTINUED | OUTPATIENT
Start: 2025-06-19 | End: 2025-06-23 | Stop reason: HOSPADM

## 2025-06-19 RX ORDER — ENOXAPARIN SODIUM 100 MG/ML
30 INJECTION SUBCUTANEOUS 2 TIMES DAILY
Status: DISCONTINUED | OUTPATIENT
Start: 2025-06-20 | End: 2025-06-23 | Stop reason: HOSPADM

## 2025-06-19 RX ORDER — SODIUM CHLORIDE 0.9 % (FLUSH) 0.9 %
5-40 SYRINGE (ML) INJECTION EVERY 12 HOURS SCHEDULED
Status: DISCONTINUED | OUTPATIENT
Start: 2025-06-19 | End: 2025-06-23 | Stop reason: HOSPADM

## 2025-06-19 RX ORDER — FINASTERIDE 5 MG/1
5 TABLET, FILM COATED ORAL DAILY
Status: DISCONTINUED | OUTPATIENT
Start: 2025-06-19 | End: 2025-06-23 | Stop reason: HOSPADM

## 2025-06-19 RX ORDER — ATORVASTATIN CALCIUM 20 MG/1
20 TABLET, FILM COATED ORAL NIGHTLY
Status: DISCONTINUED | OUTPATIENT
Start: 2025-06-19 | End: 2025-06-23 | Stop reason: HOSPADM

## 2025-06-19 RX ORDER — TAMSULOSIN HYDROCHLORIDE 0.4 MG/1
0.4 CAPSULE ORAL DAILY
Status: DISCONTINUED | OUTPATIENT
Start: 2025-06-20 | End: 2025-06-23 | Stop reason: HOSPADM

## 2025-06-19 RX ORDER — VITAMIN B COMPLEX
1000 TABLET ORAL 2 TIMES DAILY
Status: DISCONTINUED | OUTPATIENT
Start: 2025-06-19 | End: 2025-06-23 | Stop reason: HOSPADM

## 2025-06-19 RX ORDER — SODIUM CHLORIDE 9 MG/ML
INJECTION, SOLUTION INTRAVENOUS PRN
Status: DISCONTINUED | OUTPATIENT
Start: 2025-06-19 | End: 2025-06-23 | Stop reason: HOSPADM

## 2025-06-19 RX ORDER — MAGNESIUM HYDROXIDE/ALUMINUM HYDROXICE/SIMETHICONE 120; 1200; 1200 MG/30ML; MG/30ML; MG/30ML
30 SUSPENSION ORAL EVERY 6 HOURS PRN
Status: DISCONTINUED | OUTPATIENT
Start: 2025-06-19 | End: 2025-06-23 | Stop reason: HOSPADM

## 2025-06-19 RX ORDER — LACTOBACILLUS RHAMNOSUS GG 10B CELL
1 CAPSULE ORAL DAILY
Status: DISCONTINUED | OUTPATIENT
Start: 2025-06-20 | End: 2025-06-23 | Stop reason: HOSPADM

## 2025-06-19 RX ORDER — FAMOTIDINE 20 MG/1
20 TABLET, FILM COATED ORAL DAILY
Status: DISCONTINUED | OUTPATIENT
Start: 2025-06-19 | End: 2025-06-23 | Stop reason: HOSPADM

## 2025-06-19 RX ORDER — ASPIRIN 325 MG
325 TABLET ORAL DAILY
Status: DISCONTINUED | OUTPATIENT
Start: 2025-06-20 | End: 2025-06-23 | Stop reason: HOSPADM

## 2025-06-19 RX ORDER — POTASSIUM CHLORIDE 750 MG/1
20 TABLET, EXTENDED RELEASE ORAL DAILY
Status: DISCONTINUED | OUTPATIENT
Start: 2025-06-20 | End: 2025-06-23 | Stop reason: HOSPADM

## 2025-06-19 RX ORDER — SODIUM CHLORIDE 0.9 % (FLUSH) 0.9 %
5-40 SYRINGE (ML) INJECTION PRN
Status: DISCONTINUED | OUTPATIENT
Start: 2025-06-19 | End: 2025-06-23 | Stop reason: HOSPADM

## 2025-06-19 RX ORDER — ONDANSETRON 4 MG/1
4 TABLET, ORALLY DISINTEGRATING ORAL EVERY 8 HOURS PRN
Status: DISCONTINUED | OUTPATIENT
Start: 2025-06-19 | End: 2025-06-23 | Stop reason: HOSPADM

## 2025-06-19 RX ADMIN — AZITHROMYCIN MONOHYDRATE 500 MG: 500 INJECTION, POWDER, LYOPHILIZED, FOR SOLUTION INTRAVENOUS at 16:04

## 2025-06-19 RX ADMIN — Medication 1000 UNITS: at 22:51

## 2025-06-19 RX ADMIN — FUROSEMIDE 40 MG: 10 INJECTION, SOLUTION INTRAMUSCULAR; INTRAVENOUS at 21:27

## 2025-06-19 RX ADMIN — BISACODYL 5 MG: 5 TABLET, COATED ORAL at 21:27

## 2025-06-19 RX ADMIN — METOPROLOL TARTRATE 50 MG: 50 TABLET, FILM COATED ORAL at 21:27

## 2025-06-19 RX ADMIN — WATER 1000 MG: 1 INJECTION INTRAMUSCULAR; INTRAVENOUS; SUBCUTANEOUS at 15:59

## 2025-06-19 RX ADMIN — MICONAZOLE NITRATE: 2 POWDER TOPICAL at 21:27

## 2025-06-19 RX ADMIN — SENNOSIDES, DOCUSATE SODIUM 2 TABLET: 50; 8.6 TABLET, FILM COATED ORAL at 21:27

## 2025-06-19 RX ADMIN — ATORVASTATIN CALCIUM 20 MG: 20 TABLET, FILM COATED ORAL at 21:27

## 2025-06-19 RX ADMIN — SODIUM CHLORIDE, PRESERVATIVE FREE 10 ML: 5 INJECTION INTRAVENOUS at 21:27

## 2025-06-19 ASSESSMENT — PAIN - FUNCTIONAL ASSESSMENT
PAIN_FUNCTIONAL_ASSESSMENT: NONE - DENIES PAIN

## 2025-06-19 NOTE — ED NOTES
Admission provider in for evaluation and to update on POC with patient and family. VSS. Call light in reach. Patient medicated per MAR.

## 2025-06-19 NOTE — H&P
Hospitalist - History & Physical      Patient: Walter Ochoa    Unit/Bed:02/002A  YOB: 1934  MRN: [id number]   Acct: [id number]   PCP: Nael Parks, [facility]    SUBJECTIVE    Chief Complaint:  weakness    History Of Present Illness:  Walter Ochoa is a 89 y.o. male with PMH of atrial flutter, CAD status post CABG, HTN/HLD, COPD, CKD 3A, obesity, arthritis in bilateral knees, ARGENIS, BLE lymphedema, stasis dermatitis with hyperkeratosis, limited mobility at baseline and uses a wheelchair/walker who presents to [facility] with weakness and inability to ambulate.  At baseline the patient has limited mobility and is only able to ambulate 15 feet with a walker but otherwise uses a wheelchair most of the time.  His daughter reports that he had a fall while his son was helping him get to the bathroom, denies LOC or prodromal symptoms and did not hit his head.  He is not on any blood thinners other than aspirin 324 mg daily.  The daughter who is at bedside also reports that today he was completely unable to ambulate and his kids were unable to lift him up.  He also has signs and symptoms of dysphagia and the family reports that during eating he often coughs and chokes. Denies any respiratory symptoms or signs or symptoms of infection.  Reports possible constipation. He is ANO x 3.  I discussed with the patient and the patient's 2 daughters at bedside CODE STATUS and they confirmed that he is DNR-CC.  They are unable to take care of the patient at home and would also like placement.    ED course:   Initial vitals in ED are stable and unremarkable.  Labs: BMP shows hypernatremia that is mild with a NA of 146, K4.6, chloride 107, bicarb 30, BUN and creatinine 23 and 1.2, GFR 57, magnesium 2.4, glucose 100, calcium 9.3.  Lipase 21.  TSH normal at 3.01.  CBC unremarkable with a WBC of 7.9, hemoglobin 11.0 with MCV of 106, platelets 237.  Urinalysis unremarkable.  Chest x-ray  frequency and small voided volumes.  Urinalysis negative.  Could be possibly retaining urine.  Bladder scans ordered.    Dysphagia-bedside swallow and dysphagia diet ordered.  SLP consulted.    Constipation-increase bowel regimen.  AXR ordered.    Physical debility/weakness/progressive functional decline-limited mobility at baseline which has acutely worsened. PT/OT evaluation. CM/SW for placement.     DNR comfort care-will avoid aggressive or uncomfortable treatments and monitoring due to patient being comfort care, discussed with daughter at bedside.  Will not order telemetry or frequent blood draws.  Will order a.m. labs since we are diuresing.  Palliative care evaluation ordered for further assistance and establishment of care.    Chronic Conditions: (reviewed and stable unless otherwise stated)     Atrial flutter, currently in NSR-continue home metoprolol 50 mg twice daily  CAD status post CABG/HTN/HLD-continue home atorvastatin, aspirin 324 mg daily, metoprolol tartrate 50 mg twice daily.  Hold home p.o. Lasix.  COPD, not in exacerbation  Chronic macrocytic anemia-baseline appears to be around 9-12.  Presented with hemoglobin of 11.0.  MCV is 106. Cont to monitor.   EEQ5m-srirsipu creatinine appears to be around 1.2-1.3 which he is at   Obesity with BMI of 37  Arthritis-gets regularly scheduled cortisone injections in bilateral knees outpatient.  Daughter reports he is due soon.  If patient is still inpatient and cortisone injections are due then we will consider giving them inpatient if possible.  Tylenol as needed while inpatient.  ARGENIS-will order home CPAP after obtaining settings.  BPH-continue home finasteride and Flomax    Data reviewed (unless otherwise discussed in assessment/plan)  EKG:  I have reviewed the EKG with the following interpretation: NSR  Imaging: I have reviewed CXR with the following interpretation: See above  Labs: Reviewed, see chart and plan above.

## 2025-06-19 NOTE — ED NOTES
Patient presents to the ED via [facility] from home for concerns of generalized weakness and fatigue with some SOB. Patient states he hasn't been able to get around as good as before and family concerned as well. Patient denies any pain at this time. Patient has some bilateral lower leg swelling. Patient denies any SOB at rest but does report on exertion. Respirations easy and unlabored at this time.

## 2025-06-19 NOTE — ED PROVIDER NOTES
[facility] EMERGENCY DEPARTMENT  EMERGENCY DEPARTMENT ENCOUNTER          Pt Name: Walter Ochoa  MRN: [id number]  Birthdate 2/10/1934  Date of evaluation: 6/19/2025  Physician: Orlando Adames MD  Supervising Attending Physician: Dr. Jeri Felix      CHIEF COMPLAINT       Chief Complaint   Patient presents with    Fatigue    Shortness of Breath         HISTORY OF PRESENT ILLNESS    HPI  Walter Ochoa is a 89 y.o. male with a PMHx CAD s/p CABG x3, 2016, hx MI,COPD, HTN, HLD, CHF, TIA who presents to the emergency department from home for evaluation of generalized weakness and shortness of breath.  Patient reports that he has been having a hard time getting around the house.  To note that daughter mentioned that patient had fallen 2 days prior to presentation.  No head trauma.  Patient is not on blood thinners.  Patient was able to get up with assistance and walk afterwards.  However, patient had a hard time getting up today which is unusual for him as per the daughter.  Patient reports no shortness of breath.  Patient reports no pain, no numbness in his extremities, no abdominal pain, no nausea, no vomiting, no fever, no chills.  Daughter is concerned about patient's right leg as she has noted that it has been more swollen than usual.      PAST MEDICAL AND SURGICAL HISTORY     Past Medical History:   Diagnosis Date    Acute on chronic diastolic CHF (congestive heart failure), NYHA class 2 ([facility])     Arthritis     Atrial flutter ([facility]) 6/15/2016    BPH (benign prostatic hyperplasia)     CAD (coronary artery disease)     Cancer ([facility])     skin    CKD (chronic kidney disease) stage 3, GFR 30-59 ml/min ([facility]) 6/20/2016    COPD with exacerbation ([facility]) 6/15/2016    Hyperlipidemia     Hypertension     NSTEMI (non-ST elevated myocardial infarction) ([facility])     Obesity (BMI 35.0-39.9 without comorbidity) 06/27/2016    ARGENIS (obstructive sleep apnea)     uses cpap    S/P CABG x 3 06/22/2016    S/P cardiac

## 2025-06-19 NOTE — CARE COORDINATION
Advance Care Planning     General Advance Care Planning (ACP) Conversation    Date of Conversation: 6/19/2025  Conducted with: Patient with Decision Making Capacity  Other persons present: Daughter Lili    Healthcare Decision Maker:   The identified healthcare power of /surrogate decision makers are as follows:   Primary Decision Maker: Zahida Ochoa Poa - Spouse - [phone]    Secondary Decision Maker: Lili Brown - Child - [phone]    Supplemental (Other) Decision Maker: Clive Ochoa - Child - [phone]     Content/Action Overview:  Patient has valid ACP documents on file in the chart.    Would like to complete new this admission as spouse no longer has cognition to make decisions.      Treatment limitations and CODE STATUS to be reviewed by the provider.    Length of Voluntary ACP Conversation in minutes:  <16 minutes (Non-Billable)    SANDRA MANNING RN

## 2025-06-19 NOTE — ED PROVIDER NOTES

## 2025-06-19 NOTE — ED NOTES
ED to inpatient nurses report      Chief Complaint:  Chief Complaint   Patient presents with    Fatigue    Shortness of Breath     Present to ED from: home via EMS     MOA:     LOC: alert and orientated to name, place, date  Mobility: Requires assistance * 2  Oxygen Baseline: room air     Current needs required: room air      Code Status:   DNR-CC    What abnormal results were found and what did you give/do to treat them? Difficulty with ADL's, pneumonia  Any procedures or intervention occur? OT/PT, antibiotics    Mental Status:  Level of Consciousness: Alert (0)    Psych Assessment:        Vitals:  Patient Vitals for the past 24 hrs:   BP Temp Temp src Pulse Resp SpO2 Weight   06/19/25 1727 -- -- -- 88 21 -- --   06/19/25 1717 133/68 -- -- 83 15 -- --   06/19/25 1604 (!) 168/67 -- -- 82 14 95 % --   06/19/25 1444 (!) 144/64 -- -- 81 19 92 % --   06/19/25 1341 124/63 -- -- 77 18 93 % --   06/19/25 1235 (!) 114/57 -- -- 85 20 -- --   06/19/25 1113 125/64 -- -- 86 20 -- --   06/19/25 1108 115/89 -- -- 87 -- -- --   06/19/25 1024 133/68 -- -- -- -- 94 % --   06/19/25 1020 -- 98.1 °F (36.7 °C) Oral 85 22 95 % 113.4 kg (250 lb)        LDAs:   Peripheral IV 06/19/25 Right Wrist (Active)   Site Assessment Clean, dry & intact 06/19/25 1022   Line Status Blood return noted;Flushed;Normal saline locked 06/19/25 1022   Line Care Connections checked and tightened 06/19/25 1022   Phlebitis Assessment No symptoms 06/19/25 1022   Infiltration Assessment 0 06/19/25 1022   Dressing Status Clean, dry & intact 06/19/25 1022   Dressing Intervention New 06/19/25 1022       Ambulatory Status:  Presents to emergency department  because of falls (Syncope, seizure, or loss of consciousness): No, Age > 70: Yes, Altered Mental Status, Intoxication with alcohol or substance confusion (Disorientation, impaired judgment, poor safety awaremess, or inability to follow instructions): No, Impaired Mobility: Ambulates or transfers with assistive

## 2025-06-20 LAB
ANION GAP SERPL CALC-SCNC: 9 MEQ/L (ref 8–16)
BUN SERPL-MCNC: 21 MG/DL (ref 8–23)
CALCIUM SERPL-MCNC: 8.6 MG/DL (ref 8.2–9.6)
CHLORIDE SERPL-SCNC: 104 MEQ/L (ref 98–111)
CO2 SERPL-SCNC: 29 MEQ/L (ref 22–29)
CREAT SERPL-MCNC: 1.1 MG/DL (ref 0.7–1.2)
GFR SERPL CREATININE-BSD FRML MDRD: 63 ML/MIN/1.73M2
GLUCOSE SERPL-MCNC: 114 MG/DL (ref 74–109)
POTASSIUM SERPL-SCNC: 4.3 MEQ/L (ref 3.5–5.2)
SODIUM SERPL-SCNC: 142 MEQ/L (ref 135–145)

## 2025-06-20 PROCEDURE — 97530 THERAPEUTIC ACTIVITIES: CPT

## 2025-06-20 PROCEDURE — 6370000000 HC RX 637 (ALT 250 FOR IP): Performed by: STUDENT IN AN ORGANIZED HEALTH CARE EDUCATION/TRAINING PROGRAM

## 2025-06-20 PROCEDURE — 36415 COLL VENOUS BLD VENIPUNCTURE: CPT

## 2025-06-20 PROCEDURE — 2500000003 HC RX 250 WO HCPCS: Performed by: STUDENT IN AN ORGANIZED HEALTH CARE EDUCATION/TRAINING PROGRAM

## 2025-06-20 PROCEDURE — 6360000002 HC RX W HCPCS: Performed by: STUDENT IN AN ORGANIZED HEALTH CARE EDUCATION/TRAINING PROGRAM

## 2025-06-20 PROCEDURE — 97166 OT EVAL MOD COMPLEX 45 MIN: CPT

## 2025-06-20 PROCEDURE — 1200000000 HC SEMI PRIVATE

## 2025-06-20 PROCEDURE — 99233 SBSQ HOSP IP/OBS HIGH 50: CPT | Performed by: STUDENT IN AN ORGANIZED HEALTH CARE EDUCATION/TRAINING PROGRAM

## 2025-06-20 PROCEDURE — 97163 PT EVAL HIGH COMPLEX 45 MIN: CPT

## 2025-06-20 PROCEDURE — 80048 BASIC METABOLIC PNL TOTAL CA: CPT

## 2025-06-20 RX ORDER — AMMONIUM LACTATE 12 G/100G
LOTION TOPICAL 2 TIMES DAILY
Status: DISCONTINUED | OUTPATIENT
Start: 2025-06-20 | End: 2025-06-23 | Stop reason: HOSPADM

## 2025-06-20 RX ORDER — FUROSEMIDE 10 MG/ML
40 INJECTION INTRAMUSCULAR; INTRAVENOUS 2 TIMES DAILY
Status: COMPLETED | OUTPATIENT
Start: 2025-06-20 | End: 2025-06-21

## 2025-06-20 RX ADMIN — Medication 1000 UNITS: at 08:48

## 2025-06-20 RX ADMIN — ENOXAPARIN SODIUM 30 MG: 100 INJECTION SUBCUTANEOUS at 20:25

## 2025-06-20 RX ADMIN — MICONAZOLE NITRATE: 2 POWDER TOPICAL at 20:25

## 2025-06-20 RX ADMIN — ACETAMINOPHEN 650 MG: 325 TABLET ORAL at 18:42

## 2025-06-20 RX ADMIN — FUROSEMIDE 40 MG: 10 INJECTION, SOLUTION INTRAMUSCULAR; INTRAVENOUS at 18:44

## 2025-06-20 RX ADMIN — POLYETHYLENE GLYCOL 3350 17 G: 17 POWDER, FOR SOLUTION ORAL at 00:05

## 2025-06-20 RX ADMIN — SENNOSIDES, DOCUSATE SODIUM 2 TABLET: 50; 8.6 TABLET, FILM COATED ORAL at 08:49

## 2025-06-20 RX ADMIN — METOPROLOL TARTRATE 50 MG: 50 TABLET, FILM COATED ORAL at 20:25

## 2025-06-20 RX ADMIN — MICONAZOLE NITRATE: 2 POWDER TOPICAL at 08:50

## 2025-06-20 RX ADMIN — FINASTERIDE 5 MG: 5 TABLET, FILM COATED ORAL at 08:49

## 2025-06-20 RX ADMIN — Medication: at 16:42

## 2025-06-20 RX ADMIN — FUROSEMIDE 40 MG: 10 INJECTION, SOLUTION INTRAMUSCULAR; INTRAVENOUS at 08:50

## 2025-06-20 RX ADMIN — SODIUM CHLORIDE, PRESERVATIVE FREE 10 ML: 5 INJECTION INTRAVENOUS at 08:50

## 2025-06-20 RX ADMIN — Medication 3 MG: at 20:25

## 2025-06-20 RX ADMIN — ENOXAPARIN SODIUM 30 MG: 100 INJECTION SUBCUTANEOUS at 08:50

## 2025-06-20 RX ADMIN — FAMOTIDINE 20 MG: 20 TABLET, FILM COATED ORAL at 08:49

## 2025-06-20 RX ADMIN — SENNOSIDES, DOCUSATE SODIUM 2 TABLET: 50; 8.6 TABLET, FILM COATED ORAL at 20:25

## 2025-06-20 RX ADMIN — Medication: at 20:25

## 2025-06-20 RX ADMIN — Medication 1 CAPSULE: at 08:50

## 2025-06-20 RX ADMIN — SODIUM CHLORIDE, PRESERVATIVE FREE 10 ML: 5 INJECTION INTRAVENOUS at 20:25

## 2025-06-20 RX ADMIN — TAMSULOSIN HYDROCHLORIDE 0.4 MG: 0.4 CAPSULE ORAL at 08:49

## 2025-06-20 RX ADMIN — BISACODYL 5 MG: 5 TABLET, COATED ORAL at 08:49

## 2025-06-20 RX ADMIN — ACETAMINOPHEN 650 MG: 325 TABLET ORAL at 10:43

## 2025-06-20 RX ADMIN — ASPIRIN 325 MG: 325 TABLET ORAL at 08:49

## 2025-06-20 RX ADMIN — ATORVASTATIN CALCIUM 20 MG: 20 TABLET, FILM COATED ORAL at 20:25

## 2025-06-20 RX ADMIN — Medication 1000 UNITS: at 20:25

## 2025-06-20 ASSESSMENT — PAIN DESCRIPTION - LOCATION
LOCATION: LEG
LOCATION: LEG

## 2025-06-20 ASSESSMENT — PAIN DESCRIPTION - DESCRIPTORS
DESCRIPTORS: SORE
DESCRIPTORS: SORE

## 2025-06-20 ASSESSMENT — PAIN SCALES - GENERAL
PAINLEVEL_OUTOF10: 5
PAINLEVEL_OUTOF10: 5

## 2025-06-20 ASSESSMENT — PAIN - FUNCTIONAL ASSESSMENT
PAIN_FUNCTIONAL_ASSESSMENT: ACTIVITIES ARE NOT PREVENTED
PAIN_FUNCTIONAL_ASSESSMENT: ACTIVITIES ARE NOT PREVENTED

## 2025-06-20 ASSESSMENT — PAIN DESCRIPTION - ORIENTATION
ORIENTATION: RIGHT
ORIENTATION: RIGHT

## 2025-06-20 NOTE — PROGRESS NOTES
[facility]  INPATIENT OCCUPATIONAL THERAPY  STRZ 6A PEDI/MED SURG  EVALUATION      Discharge Recommendations: Patient would benefit from continued therapy after discharge  Equipment Recommendations: No        Time In: 1306  Time Out: 1342  Timed Code Treatment Minutes: 26 Minutes  Minutes: 36          Date: 2025  Patient Name: Walter Ochoa,   Gender: male      MRN: [id number]  : 2/10/1934  (89 y.o.)  Referring Practitioner: Micheline Barron MD  Diagnosis: Physical debility  Additional Pertinent Hx: per chart review; \" Walter Ochoa is a 89 y.o. male with PMH of atrial flutter, CAD status post CABG, HTN/HLD, COPD, CKD 3A, obesity, arthritis in bilateral knees, ARGENIS, BLE lymphedema, stasis dermatitis with hyperkeratosis, limited mobility at baseline and uses a wheelchair/walker who presents to [facility] with weakness and inability to ambulate.  At baseline the patient has limited mobility and is only able to ambulate 15 feet with a walker but otherwise uses a wheelchair most of the time.  His daughter reports that he had a fall while his son was helping him get to the bathroom, denies LOC or prodromal symptoms and did not hit his head.  He is not on any blood thinners other than aspirin 324 mg daily.  The daughter who is at bedside also reports that today he was completely unable to ambulate and his kids were unable to lift him up.  He also has signs and symptoms of dysphagia and the family reports that during eating he often coughs and chokes. Denies any respiratory symptoms or signs or symptoms of infection.  Reports possible constipation. He is ANO x 3.  I discussed with the patient and the patient's 2 daughters at bedside CODE STATUS and they confirmed that he is DNR-CC.  They are unable to take care of the patient at home and would also like placement.\"    Restrictions/Precautions:  Restrictions/Precautions: Fall Risk, General Precautions  Position Activity Restriction  Other  Position/Activity Restrictions: painful (B) knees with R worse than L.    Subjective  Chart Reviewed: Yes, Orders, Progress Notes, History and Physical  Patient assessed for rehabilitation services?: Yes  Family / Caregiver Present: Yes (daughter)    Subjective: RN approved session, patient supine in bed upon OT arrival and agreeable to eval. patient A & O x 2. patient's daughter present as assisted with gathering social hx.    Pain: chronic pain in knees with R worse than L. Did not rate. Pain in L shldr.     Vitals: Vitals not assessed per clinical judgement, see nursing flowsheet  -demo slight audible wheezing after transfer to recliner    Social/Functional History:  Lives With: Son, Spouse  Type of Home: House  Home Layout: Two level, Performs ADL's on one level, Able to Live on Main level with bedroom/bathroom  Home Access: Ramped entrance  Home Equipment: Wheelchair - Manual, Lift chair, Walker - Rolling   Bathroom Shower/Tub: Tub/Shower unit  Bathroom Toilet: Standard  Bathroom Equipment: Toilet raiser, Shower chair    Receives Help From: Family  Prior Level of Assist for ADLs: Needs assistance  Homemaking Responsibilities: No  Prior Level of Assist for Transfers: Needs assistance  Has the patient had two or more falls in the past year or any fall with injury in the past year?: Yes    Active : No  Patient's  Info: family  Occupation: Retired  Additional Comments: patient's son is patient's caregiver and is home 24/7 to assist as needed. patient was walking short distances prior to admit. daughter reports her family has ordered a beena steady for home.    VISION:Corrected    HEARING:  hard of hearing    COGNITION: Decreased Recall, Decreased Insight, Impaired Memory, and Decreased Problem Solving    RANGE OF MOTION:  Right Upper Extremity: WFL  Left Upper Extremity:  Impaired - AROM shldr flex to approx 80 degrees    STRENGTH:  Right Upper Extremity: elbow flex 4/5 ext 4/5  (F)  Left Upper

## 2025-06-20 NOTE — PLAN OF CARE
Problem: Chronic Conditions and Co-morbidities  Goal: Patient's chronic conditions and co-morbidity symptoms are monitored and maintained or improved  Outcome: Progressing  Flowsheets (Taken 6/19/2025 2322)  Care Plan - Patient's Chronic Conditions and Co-Morbidity Symptoms are Monitored and Maintained or Improved:   Monitor and assess patient's chronic conditions and comorbid symptoms for stability, deterioration, or improvement   Collaborate with multidisciplinary team to address chronic and comorbid conditions and prevent exacerbation or deterioration   Update acute care plan with appropriate goals if chronic or comorbid symptoms are exacerbated and prevent overall improvement and discharge     Problem: Discharge Planning  Goal: Discharge to home or other facility with appropriate resources  Outcome: Progressing  Flowsheets (Taken 6/19/2025 2322)  Discharge to home or other facility with appropriate resources:   Identify barriers to discharge with patient and caregiver   Identify discharge learning needs (meds, wound care, etc)   Refer to discharge planning if patient needs post-hospital services based on physician order or complex needs related to functional status, cognitive ability or social support system   Arrange for needed discharge resources and transportation as appropriate     Problem: Safety - Adult  Goal: Free from fall injury  Outcome: Progressing  Flowsheets (Taken 6/19/2025 2322)  Free From Fall Injury:   Instruct family/caregiver on patient safety   Based on caregiver fall risk screen, instruct family/caregiver to ask for assistance with transferring infant if caregiver noted to have fall risk factors     Problem: ABCDS Injury Assessment  Goal: Absence of physical injury  Outcome: Progressing  Flowsheets (Taken 6/19/2025 2322)  Absence of Physical Injury: Implement safety measures based on patient assessment     Problem: Skin/Tissue Integrity  Goal: Skin integrity remains intact  Description: 1.

## 2025-06-20 NOTE — PROGRESS NOTES
Hospitalist Progress Note      Patient:  Walter Ochoa    Unit/Bed:6A-09/009-A  YOB: 1934  MRN: [id number]   Acct: [id number]   PCP: Nael Parks APRN - CNP  Date of Admission: 6/19/2025    Chief Complaint: weakness  Admission HPI: Walter Ochoa is a 89 y.o. male with PMH of atrial flutter, CAD status post CABG, HTN/HLD, COPD, CKD 3A, obesity, arthritis in bilateral knees, ARGENIS, BLE lymphedema, stasis dermatitis with hyperkeratosis, limited mobility at baseline and uses a wheelchair/walker who presents to [facility] with weakness and inability to ambulate.  At baseline the patient has limited mobility and is only able to ambulate 15 feet with a walker but otherwise uses a wheelchair most of the time.  His daughter reports that he had a fall while his son was helping him get to the bathroom, denies LOC or prodromal symptoms and did not hit his head.  He is not on any blood thinners other than aspirin 324 mg daily.  The daughter who is at bedside also reports that today he was completely unable to ambulate and his kids were unable to lift him up.  He also has signs and symptoms of dysphagia and the family reports that during eating he often coughs and chokes. Denies any respiratory symptoms or signs or symptoms of infection.  Reports possible constipation. He is ANO x 3.  I discussed with the patient and the patient's 2 daughters at bedside CODE STATUS and they confirmed that he is DNR-CC.  They are unable to take care of the patient at home and would also like placement.     ED course:   Initial vitals in ED are stable and unremarkable.  Labs: BMP shows hypernatremia that is mild with a NA of 146, K4.6, chloride 107, bicarb 30, BUN and creatinine 23 and 1.2, GFR 57, magnesium 2.4, glucose 100, calcium 9.3.  Lipase 21.  TSH normal at 3.01.  CBC unremarkable with a WBC of 7.9, hemoglobin 11.0 with MCV of 106,        Radiology:  XR ABDOMEN (KUB) (SINGLE AP VIEW)   Final Result   Nonspecific, likely nonobstructive bowel gas pattern. If concern persists    consider follow-up radiographs and/or CT.      This document has been electronically signed by: Clive Marcum MD on    06/19/2025 11:59 PM      Vascular duplex lower extremity venous right   Final Result   1. There is no sonographic evidence of deep venous thrombosis within the right   lower extremity.            **This report has been created using voice recognition software.  It may contain   minor errors which are inherent in voice recognition technology.**      Electronically signed by Dr. Laurita Thomas      Vascular duplex upper extremity venous left   Final Result   1. There is no deep venous thrombosis within the left upper extremity.            **This report has been created using voice recognition software.  It may contain   minor errors which are inherent in voice recognition technology.**      Electronically signed by Dr. Laurita Thomas      XR PELVIS (1-2 VIEWS)   Final Result   1. No acute bony abnormality            **This report has been created using voice recognition software.  It may contain   minor errors which are inherent in voice recognition technology.**      Electronically signed by Dr. Laurita Thomas      XR CHEST PORTABLE   Final Result   1. Borderline heart size. Metallic sternotomy sutures and vascular clips from   prior surgery.   2. Mild interstitial pneumonitis right lung base and retrocardiac region left   lung base. No effusion.            **This report has been created using voice recognition software.  It may contain   minor errors which are inherent in voice recognition technology.**      Electronically signed by Dr. Deacon Ha        Electronically signed by Micheline Barron MD on 6/20/2025 at 4:20 PM

## 2025-06-20 NOTE — CARE COORDINATION
DISCHARGE PLANNING EVALUATION  6/20/25, 1:38 PM EDT    Reason for Referral: snf  Decision Maker: mick LOMELI, daughter, Lili  Current Services: none   New Services Requested: snf for rehab   Family/ Social/ Home environment: patient lives at home with family, has been managing care with family support.   Payment Source: medicare   Transportation at Discharge: undetermined   Post-acute (PAC) provider list was provided to patient. Patient was informed of their freedom to choose PAC provider. Discussed and offered to show the patient the relevant PAC Providers quality and resource use measures on Medicare Compare web site via computer based on patient's goals of care and treatment preferences. Questions regarding selection process were answered.      Teach Back Method used with patient and daughter  regarding care plan and discharge plan  Patient and daughter verbalized understanding of the plan of care and contribute to goal setting.       Patient preferences and discharge plan: spoke with patient and daughter about discharge plan.  They are [facility], referrals made to these facilities.  Family plans to tour both this weekend for their fist choice.    Waiting PT/OT evals     Electronically signed by ANN Dawn on 6/20/2025 at 1:38 PM

## 2025-06-20 NOTE — PLAN OF CARE
Problem: Chronic Conditions and Co-morbidities  Goal: Patient's chronic conditions and co-morbidity symptoms are monitored and maintained or improved  6/20/2025 0937 by Bhavana Hargrove RN  Outcome: Progressing  Flowsheets (Taken 6/19/2025 2322 by Veronica Smith, RN)  Care Plan - Patient's Chronic Conditions and Co-Morbidity Symptoms are Monitored and Maintained or Improved:   Monitor and assess patient's chronic conditions and comorbid symptoms for stability, deterioration, or improvement   Collaborate with multidisciplinary team to address chronic and comorbid conditions and prevent exacerbation or deterioration   Update acute care plan with appropriate goals if chronic or comorbid symptoms are exacerbated and prevent overall improvement and discharge     Problem: Discharge Planning  Goal: Discharge to home or other facility with appropriate resources  6/20/2025 0937 by Bhavana Hargrove RN  Outcome: Progressing  Flowsheets (Taken 6/19/2025 2322 by Veronica Smith, RN)  Discharge to home or other facility with appropriate resources:   Identify barriers to discharge with patient and caregiver   Identify discharge learning needs (meds, wound care, etc)   Refer to discharge planning if patient needs post-hospital services based on physician order or complex needs related to functional status, cognitive ability or social support system   Arrange for needed discharge resources and transportation as appropriate     Problem: Safety - Adult  Goal: Free from fall injury  6/20/2025 0937 by Bhavana Hargrove RN  Outcome: Progressing  Flowsheets (Taken 6/19/2025 2322 by Veronica Smith, RN)  Free From Fall Injury:   Instruct family/caregiver on patient safety   Based on caregiver fall risk screen, instruct family/caregiver to ask for assistance with transferring infant if caregiver noted to have fall risk factors     Problem: ABCDS Injury Assessment  Goal: Absence of physical injury  6/20/2025 0937 by Delvin

## 2025-06-20 NOTE — DISCHARGE INSTRUCTIONS
Wound Care Bilateral Lower Extremities:     Right lower leg wound: Cleanse wound with normal saline and gauze. Pat dry with clean gauze. Apply mepitel ag to wound bed and cover with foam dressing. Change every other day. Clean bilateral lower extremities with soap and water. Thoroughly dry. Apply miconazole powder to bilateral legs and feet on dressing change days.     Daily care for bilateral lower legs:   Apply lac-hydrin lotion to BLE. Wrap BLE with kerlix followed by ACE wraps from base of toes to below bend of knees. 50% compression and 50% overlay. If patient family would like to use other compression device (circ aids) instead of kerlix and ACE, can transition to other compressive therapy.      Follow up with Lymphedema clinic if patient family member wants to use more complex compression technique.

## 2025-06-20 NOTE — ACP (ADVANCE CARE PLANNING)
Advance Care Planning     Advance Care Planning Inpatient Note  [facility] Department    Today's Date: 6/20/2025  Unit: STRZ 6A PEDI/MED SURG    Received request from admission screening.  Upon review of chart and communication with care team, patient's decision making abilities are not in question.. Patient was/were present in the room during visit.    Goals of ACP Conversation:  Discuss advance care planning documents  Facilitate a discussion related to patient's goals of care as they align with the patient's values and beliefs.    Health Care Decision Makers:       Primary Decision Maker: Zahida Ochoa Poa - Spouse - [phone]    Secondary Decision Maker: Lili Brown - Child - [phone]    Supplemental (Other) Decision Maker: Clive Ochoa - Child - [phone]  Summary:  Verified Documents  Verified Healthcare Decision Maker  Updated Healthcare Decision Maker    Advance Care Planning Documents (Patient Wishes):  None     Assessment:  The patient was delivered a copy of ACP documents. The patient declined requesting ACP documents and shared no interest in the documents. The  inspected the patient's existing documents and found them adequate. The documents were jumbled and mis ordered thus a correction to medical records was issued. The patient's existing agent's information was also completed.       Interventions:  Encouraged ongoing ACP conversation with future decision makers and loved ones  Patient DECLINED ACP conversation    Care Preferences Communicated:   No    Outcomes/Plan:  ACP Discussion: Completed  Existing advance directive reviewed with patient; no changes to patient's previously recorded wishes.    Electronically signed by Chaplain French on 6/20/2025 at 8:25 AM

## 2025-06-20 NOTE — PROGRESS NOTES
Patient received sacramental anointing by a . If you are in need of  support, please call [phone]. If you are in need of a  after 6:30pm, please call the house supervisor for the on-call .     06/20/25 8065   Encounter Summary   Encounter Overview/Reason  Encounter   Service Provided For Patient   Referral/Consult From [facility]   Support System Family members   Last Encounter  06/20/25   Complexity of Encounter Low   Spiritual/Emotional needs   Type Spiritual Support   Rituals, Rites and Sacraments   Type Anointing   Assessment/Intervention/Outcome   Assessment Calm

## 2025-06-20 NOTE — PALLIATIVE CARE
Initial Evaluation        Patient:   Walter Ochoa  YOB: 1934  Age:  89 y.o.  Room:  [facility]09/[facility]  MRN:  [id number]   Acct: [id number]    Date of Admission:  6/19/2025 10:18 AM  Date of Service:  6/20/2025  Completed By:  Sabra Shah RN        Reason for Palliative Care Evaluation:-   DNRCC     Current Concerns   Weakness     Palliative Performance Scale   50%  Mainly sit/lie; Extensive disease; Can't do any work; Considerable assist; intake normal or reduced; LOC full/confusion     Goals of Care Discussions and Plan         Advance Care Planning   Goals of Care/Advance Care Planning (ACP) Conversation    Date of Conversation: 06/20/25    Individuals present for the conversation: Patient with decision making capacity and Daughter Lili     ACP documents on file prior to discussion:  -Power of  for Healthcare  -Living Will. New documents completed    Healthcare Power of /Healthcare Surrogate Decision Makers:    Primary Decision Maker: StephanieLili - Child - [phone]    Secondary Decision Maker: Clive Ochoa - Child - [phone]    Conversation Summary: Patient resting in bed, Lili at bedside. Patient shares that \"his legs just stopped working\". Patient shares that he was from home with family, but has since \"become too much for them\". Discussed plan for SNF, patient agreeable. Confirmed current DNRCC in EMR. Lili did bring updated portable DNR. Lili also stated that they are hoping to complete new ACP docs. Assisted with completing.    Treatment Limitations: They would not want cardiopulmonary resuscitation in the event of cardiac arrest nor would they want to be intubated and mechanically ventilated under any circumstances. They would want their medical care to maximize comfort through symptom management and allow for a natural death. Oxygen, suction and manual treatment of airway obstruction should be used as needed for comfort. They would want to avoid

## 2025-06-20 NOTE — CARE COORDINATION
Case Management Assessment Initial Evaluation    Date/Time of Evaluation: 2025 7:17 AM  Assessment Completed by: Nevaeh Arzola RN    If patient is discharged prior to next notation, then this note serves as note for discharge by case management.    Patient Name: Walter Ochoa                   YOB: 1934  Diagnosis: Physical debility [R53.81]  Ambulatory dysfunction [R26.2]  Pneumonia of both lower lobes due to infectious organism [J18.9]                   Date / Time: 2025 10:18 AM  Location: [facility]     Patient Admission Status: Inpatient   Readmission Risk Low 0-14, Mod 15-19), High > 20: Readmission Risk Score: 19.3    Current PCP: Nael Parks, PEPITO - CNP  Health Care Decision Makers:   Primary Decision Maker: Zahidabrianne Ochoa Poa - Spouse - [phone]    Secondary Decision Maker: Lili Brown - Child - [phone]    Supplemental (Other) Decision Maker: Clive Ochoa - Child - [phone]    Additional Case Management Notes: From ED, Palliative Care eval, PT/OT, Asa, Lovenox, Pepcid, IV Lasix, Zofran prn.    Procedures: none    Imagin/19 CXR 1. Borderline heart size. Metallic sternotomy sutures and vascular clips from   prior surgery.   2. Mild interstitial pneumonitis right lung base and retrocardiac region left   lung base. No effusion.      KUB Nonspecific, likely nonobstructive bowel gas pattern. If concern persists   consider follow-up radiographs and/or CT     Patient Goals/Plan/Treatment Preferences: Met with Walter and his daughter Lili. Lili shares that Walter lives at home with his wife. He requires assistance with ADL's and has been having falls as of late. He has DME. Plan is to go to SNF for rehab at discharge. SW consult in place. Will follow.      25 [id number]   Service Assessment   Patient Orientation Alert and Oriented   Cognition Alert   History Provided By Patient;Child/Family   Primary Caregiver Family   Support Systems

## 2025-06-20 NOTE — PROGRESS NOTES
[facility] Nurse  Progress Note       Walter Ochoa  AGE: 89 y.o.   GENDER: male  : 2/10/1934  UNIT: 6A-09/009-A  TODAY'S DATE:  2025  ADMISSION DATE: 2025 10:18 AM    Subjective   Reason for C Evaluation and Assessment: RLE wound      Walter Ochoa is a 89 y.o. male referred by:   [] Physician/ Resident/ PA/ APRN-CNP  [x] Nursing  [] Other:     Wound Identification:  Wound Type:venous  Wound Location: Right leg     Objective     Michael Risk Score: Michael Scale Score: 16    Assessment     Encounter:   Present to pt room. Pt in bed.  Wound photo, assessment and treatment recommendations below.   Pt noted to have venous ulcer with dry drainage to wound bed. Cleaned off old drainage. Wound pink/red. Cleaned off some of hyperkeratosis to legs. Cleansed legs with normal saline and gauze. Applied miconazole powder to bilateral legs and feet. Applied mepitel ag to wound bed, cover with foam. Detailed instructions for BLE care in d/c instructions below. Pt daughter states he was using lymphedema wraps prior to admission and plans to go to SNF. Recommend pt have another follow up apt with lymphedema clinic to relay detailed compression instructions to SNF. Right now we will order ACE wraps. Pt can also use on Circ-aid compression devices as an alternative compression therapy instead of ACE. Pt daughter verbalizes understanding.   Pt in bed. Will continue to follow and assess wound.   Call with concerns and for wound evolution.       Wound 25 Pretibial Distal;Right (Active)   Wound Image   25 1129   Wound Etiology Venous 25 1129   Dressing Status New dressing applied 25 1129   Wound Cleansed Cleansed with saline 25 1129   Dressing/Treatment Foam;Silver dressing 25 1129   Dressing Change Due 25 1129   Wound Length (cm) 2.5 cm 25 1129   Wound Width (cm) 2.3 cm 25 1129   Wound Surface Area (cm^2) 5.75 cm^2 25 1129

## 2025-06-20 NOTE — PROGRESS NOTES
Spiritual Health History and Assessment/Progress Note  [facility]    Advance Care Planning,  ,  ,      Name: Walter Ochoa MRN: [id number]    Age: 89 y.o.     Sex: male   Language: English   Bahai: Congregational   Physical debility     Date: 6/20/2025            Total Time Calculated: (P) 25 min              Spiritual Assessment began in STRZ 6A PEDI/MED SURG        Referral/Consult From: Nurse   Encounter Overview/Reason: Advance Care Planning  Service Provided For: Patient    Meryl, Belief, Meaning:   Patient identifies as spiritual, is connected with a meryl tradition or spiritual practice, and has beliefs or practices that help with coping during difficult times  Family/Friends No family/friends present      Importance and Influence:  Patient has spiritual/personal beliefs that influence decisions regarding their health  Family/Friends No family/friends present    Community:  Patient is connected with a spiritual community  Family/Friends No family/friends present    Assessment and Plan of Care:     Patient Interventions include: Assisted in Advance Care Planning conversation  Family/Friends Interventions include: No family/friends present    Patient Plan of Care: Spiritual Care available upon further referral  Family/Friends Plan of Care: No family/friends present    Electronically signed by Chaplain French on 6/20/2025 at 8:30 AM     None

## 2025-06-20 NOTE — CARE COORDINATION
6/20/25, 3:09 PM EDT    DISCHARGE PLANNING EVALUATION    [facility] able to accept.  Family plans to tour both snfs.  SNFs can accept Monday 6/23, updated daughter

## 2025-06-20 NOTE — PROGRESS NOTES
[facility]  INPATIENT PHYSICAL THERAPY  EVALUATION  STR[facility] 6A PEDI/MED SURG - 6A-09/009-A    Discharge Recommendations: Continue to assess pending progress, Subacute/Skilled Nursing Facility, Patient would benefit from continued therapy after discharge  Equipment Recommendations:    monitor for needs            Time In: 1415  Time Out: 1435  Timed Code Treatment Minutes: 8 Minutes  Minutes: 20          Date: 2025  Patient Name: Walter Ochoa,  Gender:  male        MRN: [id number]  : 2/10/1934  (89 y.o.)      Referring Practitioner: Dr. ANGY Lacey  Diagnosis: Physical debility  Additional Pertinent Hx: per chart review; \" Walter Ochoa is a 89 y.o. male with PMH of atrial flutter, CAD status post CABG, HTN/HLD, COPD, CKD 3A, obesity, arthritis in bilateral knees, ARGENIS, BLE lymphedema, stasis dermatitis with hyperkeratosis, limited mobility at baseline and uses a wheelchair/walker who presents to [facility] with weakness and inability to ambulate.  At baseline the patient has limited mobility and is only able to ambulate 15 feet with a walker but otherwise uses a wheelchair most of the time.  His daughter reports that he had a fall while his son was helping him get to the bathroom, denies LOC or prodromal symptoms and did not hit his head.  He is not on any blood thinners other than aspirin 324 mg daily.  The daughter who is at bedside also reports that today he was completely unable to ambulate and his kids were unable to lift him up.  He also has signs and symptoms of dysphagia and the family reports that during eating he often coughs and chokes. Denies any respiratory symptoms or signs or symptoms of infection.  Reports possible constipation. He is ANO x 3.  I discussed with the patient and the patient's 2 daughters at bedside CODE STATUS and they confirmed that he is DNR-CC.  They are unable to take care of the patient at home and would also like placement.\"

## 2025-06-21 LAB
ANION GAP SERPL CALC-SCNC: 12 MEQ/L (ref 8–16)
BUN SERPL-MCNC: 23 MG/DL (ref 8–23)
CALCIUM SERPL-MCNC: 9 MG/DL (ref 8.2–9.6)
CHLORIDE SERPL-SCNC: 103 MEQ/L (ref 98–111)
CO2 SERPL-SCNC: 28 MEQ/L (ref 22–29)
CREAT SERPL-MCNC: 1.4 MG/DL (ref 0.7–1.2)
GFR SERPL CREATININE-BSD FRML MDRD: 47 ML/MIN/1.73M2
GLUCOSE SERPL-MCNC: 133 MG/DL (ref 74–109)
MAGNESIUM SERPL-MCNC: 2.6 MG/DL (ref 1.6–2.6)
NT-PROBNP SERPL IA-MCNC: 1494 PG/ML (ref 0–449)
PHOSPHATE SERPL-MCNC: 3.9 MG/DL (ref 2.5–4.5)
POTASSIUM SERPL-SCNC: 4.2 MEQ/L (ref 3.5–5.2)
REASON FOR REJECTION: NORMAL
REJECTED TEST: NORMAL
SODIUM SERPL-SCNC: 143 MEQ/L (ref 135–145)

## 2025-06-21 PROCEDURE — 80048 BASIC METABOLIC PNL TOTAL CA: CPT

## 2025-06-21 PROCEDURE — 83880 ASSAY OF NATRIURETIC PEPTIDE: CPT

## 2025-06-21 PROCEDURE — 1200000000 HC SEMI PRIVATE

## 2025-06-21 PROCEDURE — 99233 SBSQ HOSP IP/OBS HIGH 50: CPT | Performed by: STUDENT IN AN ORGANIZED HEALTH CARE EDUCATION/TRAINING PROGRAM

## 2025-06-21 PROCEDURE — 2500000003 HC RX 250 WO HCPCS: Performed by: STUDENT IN AN ORGANIZED HEALTH CARE EDUCATION/TRAINING PROGRAM

## 2025-06-21 PROCEDURE — 51701 INSERT BLADDER CATHETER: CPT

## 2025-06-21 PROCEDURE — 51798 US URINE CAPACITY MEASURE: CPT

## 2025-06-21 PROCEDURE — 6360000002 HC RX W HCPCS: Performed by: STUDENT IN AN ORGANIZED HEALTH CARE EDUCATION/TRAINING PROGRAM

## 2025-06-21 PROCEDURE — 36415 COLL VENOUS BLD VENIPUNCTURE: CPT

## 2025-06-21 PROCEDURE — 6370000000 HC RX 637 (ALT 250 FOR IP): Performed by: STUDENT IN AN ORGANIZED HEALTH CARE EDUCATION/TRAINING PROGRAM

## 2025-06-21 PROCEDURE — 84100 ASSAY OF PHOSPHORUS: CPT

## 2025-06-21 PROCEDURE — 83735 ASSAY OF MAGNESIUM: CPT

## 2025-06-21 RX ORDER — SENNA AND DOCUSATE SODIUM 50; 8.6 MG/1; MG/1
2 TABLET, FILM COATED ORAL
Status: DISCONTINUED | OUTPATIENT
Start: 2025-06-22 | End: 2025-06-23 | Stop reason: HOSPADM

## 2025-06-21 RX ORDER — BISACODYL 5 MG/1
5 TABLET, DELAYED RELEASE ORAL EVERY OTHER DAY
Status: DISCONTINUED | OUTPATIENT
Start: 2025-06-23 | End: 2025-06-23 | Stop reason: HOSPADM

## 2025-06-21 RX ORDER — ACETAMINOPHEN 500 MG
1000 TABLET ORAL EVERY MORNING
Status: DISCONTINUED | OUTPATIENT
Start: 2025-06-22 | End: 2025-06-23 | Stop reason: HOSPADM

## 2025-06-21 RX ADMIN — METOPROLOL TARTRATE 50 MG: 50 TABLET, FILM COATED ORAL at 07:41

## 2025-06-21 RX ADMIN — FAMOTIDINE 20 MG: 20 TABLET, FILM COATED ORAL at 07:41

## 2025-06-21 RX ADMIN — Medication 1 CAPSULE: at 07:40

## 2025-06-21 RX ADMIN — Medication 1000 UNITS: at 07:40

## 2025-06-21 RX ADMIN — SODIUM CHLORIDE, PRESERVATIVE FREE 10 ML: 5 INJECTION INTRAVENOUS at 07:41

## 2025-06-21 RX ADMIN — Medication 3 MG: at 21:13

## 2025-06-21 RX ADMIN — ENOXAPARIN SODIUM 30 MG: 100 INJECTION SUBCUTANEOUS at 07:41

## 2025-06-21 RX ADMIN — FINASTERIDE 5 MG: 5 TABLET, FILM COATED ORAL at 07:41

## 2025-06-21 RX ADMIN — Medication 1000 UNITS: at 21:13

## 2025-06-21 RX ADMIN — MICONAZOLE NITRATE: 2 POWDER TOPICAL at 21:13

## 2025-06-21 RX ADMIN — FUROSEMIDE 40 MG: 10 INJECTION, SOLUTION INTRAMUSCULAR; INTRAVENOUS at 07:41

## 2025-06-21 RX ADMIN — TAMSULOSIN HYDROCHLORIDE 0.4 MG: 0.4 CAPSULE ORAL at 07:41

## 2025-06-21 RX ADMIN — MICONAZOLE NITRATE: 2 POWDER TOPICAL at 07:41

## 2025-06-21 RX ADMIN — Medication: at 21:13

## 2025-06-21 RX ADMIN — SENNOSIDES, DOCUSATE SODIUM 2 TABLET: 50; 8.6 TABLET, FILM COATED ORAL at 07:41

## 2025-06-21 RX ADMIN — ATORVASTATIN CALCIUM 20 MG: 20 TABLET, FILM COATED ORAL at 21:13

## 2025-06-21 RX ADMIN — ACETAMINOPHEN 650 MG: 325 TABLET ORAL at 09:47

## 2025-06-21 RX ADMIN — ASPIRIN 325 MG: 325 TABLET ORAL at 07:40

## 2025-06-21 RX ADMIN — BISACODYL 5 MG: 5 TABLET, COATED ORAL at 07:41

## 2025-06-21 RX ADMIN — Medication: at 07:43

## 2025-06-21 RX ADMIN — SODIUM CHLORIDE, PRESERVATIVE FREE 10 ML: 5 INJECTION INTRAVENOUS at 21:14

## 2025-06-21 RX ADMIN — METOPROLOL TARTRATE 50 MG: 50 TABLET, FILM COATED ORAL at 20:04

## 2025-06-21 RX ADMIN — ENOXAPARIN SODIUM 30 MG: 100 INJECTION SUBCUTANEOUS at 21:12

## 2025-06-21 ASSESSMENT — PAIN DESCRIPTION - LOCATION: LOCATION: LEG

## 2025-06-21 ASSESSMENT — PAIN SCALES - GENERAL: PAINLEVEL_OUTOF10: 4

## 2025-06-21 ASSESSMENT — PAIN DESCRIPTION - DESCRIPTORS: DESCRIPTORS: ACHING

## 2025-06-21 ASSESSMENT — PAIN DESCRIPTION - ORIENTATION: ORIENTATION: RIGHT

## 2025-06-21 NOTE — PLAN OF CARE
Problem: Chronic Conditions and Co-morbidities  Goal: Patient's chronic conditions and co-morbidity symptoms are monitored and maintained or improved  Outcome: Progressing  Flowsheets (Taken 6/21/2025 0151)  Care Plan - Patient's Chronic Conditions and Co-Morbidity Symptoms are Monitored and Maintained or Improved:   Monitor and assess patient's chronic conditions and comorbid symptoms for stability, deterioration, or improvement   Collaborate with multidisciplinary team to address chronic and comorbid conditions and prevent exacerbation or deterioration   Update acute care plan with appropriate goals if chronic or comorbid symptoms are exacerbated and prevent overall improvement and discharge     Problem: Discharge Planning  Goal: Discharge to home or other facility with appropriate resources  6/21/2025 0151 by Veronica Smith, RN  Outcome: Progressing  Flowsheets (Taken 6/21/2025 0151)  Discharge to home or other facility with appropriate resources:   Identify barriers to discharge with patient and caregiver   Arrange for needed discharge resources and transportation as appropriate   Identify discharge learning needs (meds, wound care, etc)   Refer to discharge planning if patient needs post-hospital services based on physician order or complex needs related to functional status, cognitive ability or social support system  6/20/2025 1337 by Martha Coleman LSW  Outcome: Progressing     Problem: Safety - Adult  Goal: Free from fall injury  Outcome: Progressing  Flowsheets (Taken 6/21/2025 0151)  Free From Fall Injury:   Based on caregiver fall risk screen, instruct family/caregiver to ask for assistance with transferring infant if caregiver noted to have fall risk factors   Instruct family/caregiver on patient safety     Problem: ABCDS Injury Assessment  Goal: Absence of physical injury  Outcome: Progressing  Flowsheets (Taken 6/21/2025 0151)  Absence of Physical Injury: Implement safety measures based on

## 2025-06-21 NOTE — PROGRESS NOTES
[facility] [place]  SPEECH THERAPY COMMUNICATION NOTE  STRZ 6A PEDI/MED SURG      Date: 2025  Patient Name: Walter Ochoa        MRN: [id number]    : 2/10/1934  (89 y.o.)    COMMUNICATION NOTE:  Novel order received per Micheline Barron MD for completion of clinical swallow evaluation, current GOC reflect comfort care measures. Patient resting in bed upon arrival with daughter and son-in-law at bedside. Daughter reporting no current concerns r/t swallow function with chronic need for a soft and bite size diet with thin liquids. Family deferring assessment at this time. Please re-consult should further needs arise.       Ale Hannah M.A., CCC-SLP [id number]

## 2025-06-21 NOTE — PLAN OF CARE
Problem: Chronic Conditions and Co-morbidities  Goal: Patient's chronic conditions and co-morbidity symptoms are monitored and maintained or improved  6/21/2025 0911 by Sarah Khan RN  Outcome: Progressing  Flowsheets (Taken 6/21/2025 0151 by Veronica Smith, RN)  Care Plan - Patient's Chronic Conditions and Co-Morbidity Symptoms are Monitored and Maintained or Improved:   Monitor and assess patient's chronic conditions and comorbid symptoms for stability, deterioration, or improvement   Collaborate with multidisciplinary team to address chronic and comorbid conditions and prevent exacerbation or deterioration   Update acute care plan with appropriate goals if chronic or comorbid symptoms are exacerbated and prevent overall improvement and discharge  6/21/2025 0151 by Veronica Smith, RN  Outcome: Progressing  Flowsheets (Taken 6/21/2025 0151)  Care Plan - Patient's Chronic Conditions and Co-Morbidity Symptoms are Monitored and Maintained or Improved:   Monitor and assess patient's chronic conditions and comorbid symptoms for stability, deterioration, or improvement   Collaborate with multidisciplinary team to address chronic and comorbid conditions and prevent exacerbation or deterioration   Update acute care plan with appropriate goals if chronic or comorbid symptoms are exacerbated and prevent overall improvement and discharge     Problem: Discharge Planning  Goal: Discharge to home or other facility with appropriate resources  6/21/2025 0911 by Sarah Khan RN  Outcome: Progressing  Flowsheets (Taken 6/21/2025 0151 by Veronica Smith, RN)  Discharge to home or other facility with appropriate resources:   Identify barriers to discharge with patient and caregiver   Arrange for needed discharge resources and transportation as appropriate   Identify discharge learning needs (meds, wound care, etc)   Refer to discharge planning if patient needs post-hospital services based on physician order or complex

## 2025-06-22 LAB
ANION GAP SERPL CALC-SCNC: 8 MEQ/L (ref 8–16)
BUN SERPL-MCNC: 20 MG/DL (ref 8–23)
CALCIUM SERPL-MCNC: 9 MG/DL (ref 8.2–9.6)
CHLORIDE SERPL-SCNC: 104 MEQ/L (ref 98–111)
CO2 SERPL-SCNC: 29 MEQ/L (ref 22–29)
CREAT SERPL-MCNC: 1.1 MG/DL (ref 0.7–1.2)
GFR SERPL CREATININE-BSD FRML MDRD: 63 ML/MIN/1.73M2
GLUCOSE SERPL-MCNC: 98 MG/DL (ref 74–109)
POTASSIUM SERPL-SCNC: 4.7 MEQ/L (ref 3.5–5.2)
SODIUM SERPL-SCNC: 141 MEQ/L (ref 135–145)

## 2025-06-22 PROCEDURE — 36415 COLL VENOUS BLD VENIPUNCTURE: CPT

## 2025-06-22 PROCEDURE — 1200000000 HC SEMI PRIVATE

## 2025-06-22 PROCEDURE — 6370000000 HC RX 637 (ALT 250 FOR IP): Performed by: STUDENT IN AN ORGANIZED HEALTH CARE EDUCATION/TRAINING PROGRAM

## 2025-06-22 PROCEDURE — 2500000003 HC RX 250 WO HCPCS: Performed by: STUDENT IN AN ORGANIZED HEALTH CARE EDUCATION/TRAINING PROGRAM

## 2025-06-22 PROCEDURE — 6360000002 HC RX W HCPCS: Performed by: STUDENT IN AN ORGANIZED HEALTH CARE EDUCATION/TRAINING PROGRAM

## 2025-06-22 PROCEDURE — 80048 BASIC METABOLIC PNL TOTAL CA: CPT

## 2025-06-22 PROCEDURE — 99233 SBSQ HOSP IP/OBS HIGH 50: CPT | Performed by: STUDENT IN AN ORGANIZED HEALTH CARE EDUCATION/TRAINING PROGRAM

## 2025-06-22 RX ORDER — FAMOTIDINE 20 MG/1
20 TABLET, FILM COATED ORAL DAILY
Qty: 30 TABLET | Refills: 0
Start: 2025-06-23

## 2025-06-22 RX ORDER — SENNA AND DOCUSATE SODIUM 50; 8.6 MG/1; MG/1
2 TABLET, FILM COATED ORAL
Qty: 60 TABLET | Refills: 0
Start: 2025-06-22

## 2025-06-22 RX ORDER — ACETAMINOPHEN 500 MG
1000 TABLET ORAL EVERY MORNING
Qty: 120 TABLET | Refills: 3
Start: 2025-06-23

## 2025-06-22 RX ORDER — AMMONIUM LACTATE 12 G/100G
LOTION TOPICAL
Qty: 1 EACH | Refills: 0
Start: 2025-06-22

## 2025-06-22 RX ORDER — BISACODYL 5 MG/1
5 TABLET, DELAYED RELEASE ORAL DAILY PRN
Qty: 15 TABLET | Refills: 0
Start: 2025-06-23 | End: 2025-07-23

## 2025-06-22 RX ORDER — POLYETHYLENE GLYCOL 3350 17 G/17G
17 POWDER, FOR SOLUTION ORAL EVERY OTHER DAY
Qty: 15 PACKET | Refills: 0
Start: 2025-06-22 | End: 2025-07-22

## 2025-06-22 RX ADMIN — Medication 1000 UNITS: at 20:17

## 2025-06-22 RX ADMIN — FAMOTIDINE 20 MG: 20 TABLET, FILM COATED ORAL at 07:53

## 2025-06-22 RX ADMIN — MICONAZOLE NITRATE: 2 POWDER TOPICAL at 20:18

## 2025-06-22 RX ADMIN — FINASTERIDE 5 MG: 5 TABLET, FILM COATED ORAL at 07:53

## 2025-06-22 RX ADMIN — Medication: at 20:17

## 2025-06-22 RX ADMIN — ENOXAPARIN SODIUM 30 MG: 100 INJECTION SUBCUTANEOUS at 20:17

## 2025-06-22 RX ADMIN — MICONAZOLE NITRATE: 2 POWDER TOPICAL at 07:53

## 2025-06-22 RX ADMIN — ACETAMINOPHEN 1000 MG: 500 TABLET ORAL at 07:52

## 2025-06-22 RX ADMIN — ACETAMINOPHEN 650 MG: 325 TABLET ORAL at 18:08

## 2025-06-22 RX ADMIN — SENNOSIDES, DOCUSATE SODIUM 2 TABLET: 50; 8.6 TABLET, FILM COATED ORAL at 20:17

## 2025-06-22 RX ADMIN — ENOXAPARIN SODIUM 30 MG: 100 INJECTION SUBCUTANEOUS at 07:53

## 2025-06-22 RX ADMIN — SODIUM CHLORIDE, PRESERVATIVE FREE 10 ML: 5 INJECTION INTRAVENOUS at 20:18

## 2025-06-22 RX ADMIN — METOPROLOL TARTRATE 50 MG: 50 TABLET, FILM COATED ORAL at 07:52

## 2025-06-22 RX ADMIN — Medication 1000 UNITS: at 07:52

## 2025-06-22 RX ADMIN — ATORVASTATIN CALCIUM 20 MG: 20 TABLET, FILM COATED ORAL at 20:17

## 2025-06-22 RX ADMIN — TAMSULOSIN HYDROCHLORIDE 0.4 MG: 0.4 CAPSULE ORAL at 07:53

## 2025-06-22 RX ADMIN — Medication: at 07:53

## 2025-06-22 RX ADMIN — Medication 1 CAPSULE: at 07:52

## 2025-06-22 RX ADMIN — Medication 3 MG: at 20:17

## 2025-06-22 RX ADMIN — ASPIRIN 325 MG: 325 TABLET ORAL at 07:52

## 2025-06-22 RX ADMIN — METOPROLOL TARTRATE 50 MG: 50 TABLET, FILM COATED ORAL at 20:17

## 2025-06-22 ASSESSMENT — PAIN DESCRIPTION - LOCATION: LOCATION: LEG

## 2025-06-22 ASSESSMENT — PAIN SCALES - GENERAL: PAINLEVEL_OUTOF10: 3

## 2025-06-22 ASSESSMENT — PAIN DESCRIPTION - DESCRIPTORS: DESCRIPTORS: ACHING

## 2025-06-22 NOTE — PLAN OF CARE
Problem: Chronic Conditions and Co-morbidities  Goal: Patient's chronic conditions and co-morbidity symptoms are monitored and maintained or improved  6/22/2025 0951 by Sarah Khan RN  Outcome: Progressing  Flowsheets (Taken 6/21/2025 1949 by Nieves Maddox, RN)  Care Plan - Patient's Chronic Conditions and Co-Morbidity Symptoms are Monitored and Maintained or Improved:   Monitor and assess patient's chronic conditions and comorbid symptoms for stability, deterioration, or improvement   Collaborate with multidisciplinary team to address chronic and comorbid conditions and prevent exacerbation or deterioration   Update acute care plan with appropriate goals if chronic or comorbid symptoms are exacerbated and prevent overall improvement and discharge  6/22/2025 0016 by Nieves Maddox RN  Outcome: Progressing  Flowsheets (Taken 6/21/2025 1949)  Care Plan - Patient's Chronic Conditions and Co-Morbidity Symptoms are Monitored and Maintained or Improved:   Monitor and assess patient's chronic conditions and comorbid symptoms for stability, deterioration, or improvement   Collaborate with multidisciplinary team to address chronic and comorbid conditions and prevent exacerbation or deterioration   Update acute care plan with appropriate goals if chronic or comorbid symptoms are exacerbated and prevent overall improvement and discharge     Problem: Discharge Planning  Goal: Discharge to home or other facility with appropriate resources  6/22/2025 0951 by Sarah Khan RN  Outcome: Progressing  Flowsheets (Taken 6/21/2025 1949 by Nieves Maddox, RN)  Discharge to home or other facility with appropriate resources:   Identify barriers to discharge with patient and caregiver   Arrange for needed discharge resources and transportation as appropriate   Identify discharge learning needs (meds, wound care, etc)   Refer to discharge planning if patient needs post-hospital services based on physician order or  Monitor for areas of redness and/or skin breakdown  2.  Assess vascular access sites hourly  3.  Every 4-6 hours minimum:  Change oxygen saturation probe site  4.  Every 4-6 hours:  If on nasal continuous positive airway pressure, respiratory therapy assess nares and determine need for appliance change or resting period  6/22/2025 0951 by Sarah Khan RN  Outcome: Progressing  Flowsheets (Taken 6/22/2025 0015 by Nieves Maddox RN)  Skin Integrity Remains Intact:   Monitor for areas of redness and/or skin breakdown   Assess vascular access sites hourly  6/22/2025 0016 by Nieves Maddox RN  Outcome: Progressing  Flowsheets  Taken 6/22/2025 0015  Skin Integrity Remains Intact:   Monitor for areas of redness and/or skin breakdown   Assess vascular access sites hourly  Taken 6/21/2025 1949  Skin Integrity Remains Intact:   Monitor for areas of redness and/or skin breakdown   Assess vascular access sites hourly     Problem: Musculoskeletal - Adult  Goal: Return mobility to safest level of function  6/22/2025 0951 by Sarah Khan RN  Outcome: Progressing  Flowsheets (Taken 6/21/2025 1949 by Nieves Maddox RN)  Return Mobility to Safest Level of Function:   Assess patient stability and activity tolerance for standing, transferring and ambulating with or without assistive devices   Assist with transfers and ambulation using safe patient handling equipment as needed  6/22/2025 0016 by Nieves Maddox RN  Outcome: Progressing  Flowsheets (Taken 6/21/2025 1949)  Return Mobility to Safest Level of Function:   Assess patient stability and activity tolerance for standing, transferring and ambulating with or without assistive devices   Assist with transfers and ambulation using safe patient handling equipment as needed  Goal: Return ADL status to a safe level of function  6/22/2025 0951 by Sarah Khan RN  Outcome: Progressing  Flowsheets (Taken 6/21/2025 1949 by Nieves Maddox RN)  Return ADL  Ambulatory

## 2025-06-22 NOTE — PLAN OF CARE
Problem: Chronic Conditions and Co-morbidities  Goal: Patient's chronic conditions and co-morbidity symptoms are monitored and maintained or improved  Outcome: Progressing  Flowsheets (Taken 6/21/2025 1949)  Care Plan - Patient's Chronic Conditions and Co-Morbidity Symptoms are Monitored and Maintained or Improved:   Monitor and assess patient's chronic conditions and comorbid symptoms for stability, deterioration, or improvement   Collaborate with multidisciplinary team to address chronic and comorbid conditions and prevent exacerbation or deterioration   Update acute care plan with appropriate goals if chronic or comorbid symptoms are exacerbated and prevent overall improvement and discharge     Problem: Discharge Planning  Goal: Discharge to home or other facility with appropriate resources  Outcome: Progressing  Flowsheets (Taken 6/21/2025 1949)  Discharge to home or other facility with appropriate resources:   Identify barriers to discharge with patient and caregiver   Arrange for needed discharge resources and transportation as appropriate   Identify discharge learning needs (meds, wound care, etc)   Refer to discharge planning if patient needs post-hospital services based on physician order or complex needs related to functional status, cognitive ability or social support system     Problem: Safety - Adult  Goal: Free from fall injury  Outcome: Progressing  Flowsheets (Taken 6/21/2025 0151 by Veronica Smith, RN)  Free From Fall Injury:   Based on caregiver fall risk screen, instruct family/caregiver to ask for assistance with transferring infant if caregiver noted to have fall risk factors   Instruct family/caregiver on patient safety     Problem: ABCDS Injury Assessment  Goal: Absence of physical injury  Outcome: Progressing  Flowsheets (Taken 6/21/2025 0151 by Veronica Smith, RN)  Absence of Physical Injury: Implement safety measures based on patient assessment     Problem: Skin/Tissue Integrity  Goal:

## 2025-06-22 NOTE — PROGRESS NOTES
Hospitalist Progress Note      Patient:  Walter Ochoa    Unit/Bed:6A-09/009-A  YOB: 1934  MRN: [id number]   Acct: [id number]   PCP: Nael Parks APRN - CNP  Date of Admission: 6/19/2025    Chief Complaint: weakness  Admission HPI: Walter Ochoa is a 89 y.o. male with PMH of atrial flutter, CAD status post CABG, HTN/HLD, COPD, CKD 3A, obesity, arthritis in bilateral knees, ARGENIS, BLE lymphedema, stasis dermatitis with hyperkeratosis, limited mobility at baseline and uses a wheelchair/walker who presents to [facility] with weakness and inability to ambulate.  At baseline the patient has limited mobility and is only able to ambulate 15 feet with a walker but otherwise uses a wheelchair most of the time.  His daughter reports that he had a fall while his son was helping him get to the bathroom, denies LOC or prodromal symptoms and did not hit his head.  He is not on any blood thinners other than aspirin 324 mg daily.  The daughter who is at bedside also reports that today he was completely unable to ambulate and his kids were unable to lift him up.  He also has signs and symptoms of dysphagia and the family reports that during eating he often coughs and chokes. Denies any respiratory symptoms or signs or symptoms of infection.  Reports possible constipation. He is ANO x 3.  I discussed with the patient and the patient's 2 daughters at bedside CODE STATUS and they confirmed that he is DNR-CC.  They are unable to take care of the patient at home and would also like placement.     ED course:   Initial vitals in ED are stable and unremarkable.  Labs: BMP shows hypernatremia that is mild with a NA of 146, K4.6, chloride 107, bicarb 30, BUN and creatinine 23 and 1.2, GFR 57, magnesium 2.4, glucose 100, calcium 9.3.  Lipase 21.  TSH normal at 3.01.  CBC unremarkable with a WBC of 7.9, hemoglobin 11.0 with MCV of 106,

## 2025-06-23 VITALS
RESPIRATION RATE: 20 BRPM | DIASTOLIC BLOOD PRESSURE: 64 MMHG | BODY MASS INDEX: 41.6 KG/M2 | HEIGHT: 68 IN | TEMPERATURE: 97.7 F | OXYGEN SATURATION: 96 % | SYSTOLIC BLOOD PRESSURE: 137 MMHG | WEIGHT: 274.47 LBS | HEART RATE: 82 BPM

## 2025-06-23 PROCEDURE — 99239 HOSP IP/OBS DSCHRG MGMT >30: CPT | Performed by: STUDENT IN AN ORGANIZED HEALTH CARE EDUCATION/TRAINING PROGRAM

## 2025-06-23 PROCEDURE — 2500000003 HC RX 250 WO HCPCS: Performed by: STUDENT IN AN ORGANIZED HEALTH CARE EDUCATION/TRAINING PROGRAM

## 2025-06-23 PROCEDURE — 6360000002 HC RX W HCPCS: Performed by: STUDENT IN AN ORGANIZED HEALTH CARE EDUCATION/TRAINING PROGRAM

## 2025-06-23 PROCEDURE — 6370000000 HC RX 637 (ALT 250 FOR IP): Performed by: STUDENT IN AN ORGANIZED HEALTH CARE EDUCATION/TRAINING PROGRAM

## 2025-06-23 RX ORDER — CALCIUM CARBONATE 500 MG/1
500 TABLET, CHEWABLE ORAL 3 TIMES DAILY PRN
Status: DISCONTINUED | OUTPATIENT
Start: 2025-06-23 | End: 2025-06-23 | Stop reason: HOSPADM

## 2025-06-23 RX ORDER — PROCHLORPERAZINE MALEATE 10 MG
10 TABLET ORAL
Status: DISCONTINUED | OUTPATIENT
Start: 2025-06-23 | End: 2025-06-23

## 2025-06-23 RX ADMIN — ACETAMINOPHEN 1000 MG: 500 TABLET ORAL at 09:01

## 2025-06-23 RX ADMIN — Medication 1 CAPSULE: at 09:01

## 2025-06-23 RX ADMIN — ACETAMINOPHEN 650 MG: 325 TABLET ORAL at 15:14

## 2025-06-23 RX ADMIN — METOPROLOL TARTRATE 50 MG: 50 TABLET, FILM COATED ORAL at 09:01

## 2025-06-23 RX ADMIN — MICONAZOLE NITRATE: 2 POWDER TOPICAL at 09:01

## 2025-06-23 RX ADMIN — TAMSULOSIN HYDROCHLORIDE 0.4 MG: 0.4 CAPSULE ORAL at 09:01

## 2025-06-23 RX ADMIN — BISACODYL 5 MG: 5 TABLET, COATED ORAL at 09:01

## 2025-06-23 RX ADMIN — ASPIRIN 325 MG: 325 TABLET ORAL at 09:01

## 2025-06-23 RX ADMIN — Medication 1000 UNITS: at 09:01

## 2025-06-23 RX ADMIN — FINASTERIDE 5 MG: 5 TABLET, FILM COATED ORAL at 09:01

## 2025-06-23 RX ADMIN — Medication: at 09:02

## 2025-06-23 RX ADMIN — SODIUM CHLORIDE, PRESERVATIVE FREE 10 ML: 5 INJECTION INTRAVENOUS at 09:01

## 2025-06-23 RX ADMIN — ENOXAPARIN SODIUM 30 MG: 100 INJECTION SUBCUTANEOUS at 09:01

## 2025-06-23 RX ADMIN — FAMOTIDINE 20 MG: 20 TABLET, FILM COATED ORAL at 09:01

## 2025-06-23 ASSESSMENT — PAIN DESCRIPTION - ORIENTATION
ORIENTATION: LEFT;RIGHT
ORIENTATION: RIGHT

## 2025-06-23 ASSESSMENT — PAIN SCALES - GENERAL
PAINLEVEL_OUTOF10: 0
PAINLEVEL_OUTOF10: 3
PAINLEVEL_OUTOF10: 3
PAINLEVEL_OUTOF10: 5

## 2025-06-23 ASSESSMENT — PAIN DESCRIPTION - DESCRIPTORS
DESCRIPTORS: ACHING
DESCRIPTORS: ACHING

## 2025-06-23 ASSESSMENT — PAIN DESCRIPTION - LOCATION
LOCATION: LEG
LOCATION: LEG

## 2025-06-23 NOTE — DISCHARGE INSTR - COC
Continuity of Care Form    Patient Name: Walter Ochoa   :  2/10/1934  MRN:  [id number]    Admit date:  2025  Discharge date:  25    Code Status Order: DNR-CC   Advance Directives:     Admitting Physician:  Micheline Barron MD  PCP: Nael Parks, APRN - CNP    Discharging Nurse: LENIN Tompkins RN   Discharging Hospital Unit/Room#: 6A-09/009-A  Discharging Unit Phone Number: [phone]    Emergency Contact:   Extended Emergency Contact Information  Primary Emergency Contact: Clive Ochoa  Address: [address] [place] [address]  Home Phone: [phone]  Mobile Phone: [phone]  Relation: Child   needed? No  Secondary Emergency Contact: Lili Brown  Address: [address]           [place] [address]  Home Phone: [phone]  Mobile Phone: [phone]  Relation: Child   needed? No    Past Surgical History:  Past Surgical History:   Procedure Laterality Date    CARDIAC SURGERY  2016    triple by pass    CYSTOSCOPY  2017    Right Ureteroscopy, Basket Retrieval of Stones, Stent - Dr. Scherer    EYE SURGERY  ??    bilateral cataracts    MOHS SURGERY Left 2018    Mohs Repair BCC Left [place]     [place] UNLISTED PROCEDURE EXTERNAL EAR Left 2018    MOHS REPAIR BCC LEFT EARLOBE performed by Regan Kaba MD at [facility] OR    SKIN BIOPSY         Immunization History:   Immunization History   Administered Date(s) Administered    COVID-19, PFIZER PURPLE top, DILUTE for use, (age 12 y+), 30mcg/0.3mL 2021, 2021, 2022    DT (pediatric) 2021    Influenza 09/10/2013    Influenza Vaccine, unspecified formulation 10/19/2016    Influenza Virus Vaccine 09/10/2013, 2018    Influenza, FLUAD, (age 65 y+), IM, Trivalent PF, 0.5mL 2018, 10/09/2019    Influenza, FLUCELVAX, (age 6 mo+), MDCK, Quadv PF, 0.5mL 2017    Influenza, FLUZONE High Dose (age 65 y+), IM, Quadv, 0.7mL 2020, 10/11/2021, 10/28/2022

## 2025-06-23 NOTE — PLAN OF CARE
Problem: Chronic Conditions and Co-morbidities  Goal: Patient's chronic conditions and co-morbidity symptoms are monitored and maintained or improved  6/22/2025 2117 by Nieves Maddox, RN  Outcome: Progressing  Flowsheets (Taken 6/22/2025 2007)  Care Plan - Patient's Chronic Conditions and Co-Morbidity Symptoms are Monitored and Maintained or Improved:   Collaborate with multidisciplinary team to address chronic and comorbid conditions and prevent exacerbation or deterioration   Monitor and assess patient's chronic conditions and comorbid symptoms for stability, deterioration, or improvement   Update acute care plan with appropriate goals if chronic or comorbid symptoms are exacerbated and prevent overall improvement and discharge  6/22/2025 0951 by Sarah Khan RN  Outcome: Progressing  Flowsheets (Taken 6/21/2025 1949 by Nieves Maddox, RN)  Care Plan - Patient's Chronic Conditions and Co-Morbidity Symptoms are Monitored and Maintained or Improved:   Monitor and assess patient's chronic conditions and comorbid symptoms for stability, deterioration, or improvement   Collaborate with multidisciplinary team to address chronic and comorbid conditions and prevent exacerbation or deterioration   Update acute care plan with appropriate goals if chronic or comorbid symptoms are exacerbated and prevent overall improvement and discharge     Problem: Discharge Planning  Goal: Discharge to home or other facility with appropriate resources  6/22/2025 2117 by Nieves Maddox, RN  Outcome: Progressing  Flowsheets (Taken 6/22/2025 2007)  Discharge to home or other facility with appropriate resources:   Identify barriers to discharge with patient and caregiver   Arrange for needed discharge resources and transportation as appropriate   Identify discharge learning needs (meds, wound care, etc)   Refer to discharge planning if patient needs post-hospital services based on physician order or complex needs related to

## 2025-06-23 NOTE — CARE COORDINATION
6/23/25, 12:03 PM EDT    DISCHARGE PLANNING EVALUATION    Plan for discharge to [facility] confirmed with patient, his daughter and [facility] admissions.  Transprot scheduled with [facility] at 4:00 pm today.      6/23/25, 12:03 PM EDT    Patient goals/plan/ treatment preferences discussed by  and .  Patient goals/plan/ treatment preferences reviewed with patient/ family.  Patient/ family verbalize understanding of discharge plan and are in agreement with goal/plan/treatment preferences.  Understanding was demonstrated using the teach back method.  AVS provided by RN at time of discharge, which includes all necessary medical information pertaining to the patients current course of illness, treatment, post-discharge goals of care, and treatment preferences.     Services At/After Discharge: Skilled Nursing Facility (SNF) and In ambulance

## 2025-06-23 NOTE — DISCHARGE SUMMARY
regularly scheduled cortisone injections in bilateral knees outpatient.  Daughter reports he is due soon.  If patient is still inpatient and cortisone injections are due then we will consider giving them inpatient if possible.  Tylenol as needed while inpatient.  ARGENIS-order for home CPAP placed.    BPH-continue home finasteride and Flomax    Physical Exam:  Vitals:   Patient Vitals for the past 24 hrs:   BP Temp Temp src Pulse Resp SpO2   06/23/25 1124 137/64 97.7 °F (36.5 °C) Oral 82 20 96 %   06/23/25 0845 (!) 142/58 98.1 °F (36.7 °C) Oral 85 18 92 %   06/23/25 0338 (!) 143/59 97.4 °F (36.3 °C) Oral 76 16 95 %   06/22/25 2007 (!) 135/56 97.7 °F (36.5 °C) Oral 78 16 95 %     Weight:   Weight - Scale: 124.5 kg (274 lb 7.6 oz)   24 hour intake/output:     Intake/Output Summary (Last 24 hours) at 6/23/2025 1558  Last data filed at 6/23/2025 1332  Gross per 24 hour   Intake 340 ml   Output 1450 ml   Net -1110 ml     General: Elderly gentleman sitting up in chair, awake and alert;  HEENT: Pupils equal, round, and reactive to light; oral mucosa is moist; no pharyngeal erythema; dentition is intact  Cardiac: Regular rate and rhythm, S1 and S2 auscultated; no murmurs, gallops, or rubs; peripheral pulses 2+; BLE edema and BUE edema is at baseline  Respiratory: No respiratory distress; good effort; no wheezing, rhonchi, or crackles bilaterally   GI: Abdomen is soft, non-tender, and distended; bowel sounds are normal  : No suprapubic or pelvic tenderness   Neuro: ANOx3; CN2-12 are grossly intact; no tremors; no FNDs  MSK: Normal muscle tone and bulk, no joint effusions   Skin: Warm and dry.  BLE with stasis dermatitis and hyperkeratosis with areas of healed skin ulcerations that are superficial, no evident drainage or pus or foul smell noted.      Discharge Medications:-      Medication List        START taking these medications      acetaminophen 500 MG tablet  Commonly known as: TYLENOL  Take 2 tablets by mouth every  Magnesium 2.6 1.6 - 2.6 mg/dL   Phosphorus    Collection Time: 06/21/25  8:02 AM   Result Value Ref Range    Phosphorus 3.9 2.5 - 4.5 mg/dL   Brain Natriuretic Peptide    Collection Time: 06/21/25  8:02 AM   Result Value Ref Range    NT Pro-BNP 1494.0 (H) 0.0 - 449.0 pg/mL   SPECIMEN REJECTION    Collection Time: 06/21/25  9:04 AM   Result Value Ref Range    Rejected Test BMP     Reason for Rejection see below    Basic Metabolic Panel    Collection Time: 06/21/25  9:45 AM   Result Value Ref Range    Sodium 143 135 - 145 meq/L    Potassium 4.2 3.5 - 5.2 meq/L    Chloride 103 98 - 111 meq/L    CO2 28 22 - 29 meq/L    Glucose 133 (H) 74 - 109 mg/dL    BUN 23 8 - 23 mg/dL    Creatinine 1.4 (H) 0.7 - 1.2 mg/dL    Calcium 9.0 8.2 - 9.6 mg/dL   Anion Gap    Collection Time: 06/21/25  9:45 AM   Result Value Ref Range    Anion Gap 12.0 8.0 - 16.0 meq/L   Glomerular Filtration Rate, Estimated    Collection Time: 06/21/25  9:45 AM   Result Value Ref Range    Est, Glom Filt Rate 47 (A) >60 ml/min/1.73m2   Basic Metabolic Panel    Collection Time: 06/22/25  6:23 AM   Result Value Ref Range    Sodium 141 135 - 145 meq/L    Potassium 4.7 3.5 - 5.2 meq/L    Chloride 104 98 - 111 meq/L    CO2 29 22 - 29 meq/L    Glucose 98 74 - 109 mg/dL    BUN 20 8 - 23 mg/dL    Creatinine 1.1 0.7 - 1.2 mg/dL    Calcium 9.0 8.2 - 9.6 mg/dL   Anion Gap    Collection Time: 06/22/25  6:23 AM   Result Value Ref Range    Anion Gap 8.0 8.0 - 16.0 meq/L   Glomerular Filtration Rate, Estimated    Collection Time: 06/22/25  6:23 AM   Result Value Ref Range    Est, Glom Filt Rate 63 >60 ml/min/1.73m2        Microbiology:    Blood culture #1:   Lab Results   Component Value Date/Time    BC No growth-preliminary  No growth   03/19/2017 07:30 AM       Blood culture #2:No results found for: \"BLOODCULT2\"    Organism:  No results found for: \"LABGRAM\"    MRSA culture only:No results found for: \"MRSAC\"    Urine culture:   Lab Results   Component Value Date/Time

## 2025-06-23 NOTE — CARE COORDINATION
6/23/25, 8:20 AM EDT    DISCHARGE PLANNING EVALUATION    Family is requesting Kristy, message sent to sn[facility] for confirmation that sn[facility] can accept today

## 2025-06-23 NOTE — PROGRESS NOTES
Comprehensive Nutrition Assessment    Type and Reason for Visit:  Initial, Wound, Positive nutrition screen    Nutrition Recommendations/Plan:   Continue current diet per provider.  Per SLP (6/21/25)\"Novel order received per Micheline Barron MD for completion of clinical swallow evaluation, current GOC reflect comfort care measures. Patient resting in bed upon arrival with daughter and son-in-law at bedside. Daughter reporting no current concerns r/t swallow function with chronic need for a soft and bite size diet with thin liquids. Family deferring assessment at this time. Please re-consult should further needs arise \"  Send Darrel BID.   Pt declines CHF diet adjustment. Advanced age noted.   Consider MVI as appropriate.     Malnutrition Assessment:  Malnutrition Status:  At risk for malnutrition (06/23/25 1141)    Context:  Acute Illness     Findings of the 6 clinical characteristics of malnutrition:  Energy Intake:  No decrease in energy intake  Weight Loss:  No weight loss     Body Fat Loss:  No body fat loss     Muscle Mass Loss:  No muscle mass loss    Fluid Accumulation:  Moderate to Severe Extremities   Strength:  Not Performed    Nutrition Assessment:     Pt. nutritionally compromised AEB wounds.  At risk for further nutrition compromise r/t increased nutrient needs for wound healing,  admit with fall PTA, Diastolic HF, Aspiration Pneumonitis, BLE Lymphedema, constipation,underlying medical condition   Past Medical History:   Diagnosis Date    Acute on chronic diastolic CHF (congestive heart failure), NYHA class 2 ([facility])     Arthritis     Atrial flutter ([facility]) 6/15/2016    BPH (benign prostatic hyperplasia)     CAD (coronary artery disease)     Cancer ([facility])     skin    CKD (chronic kidney disease) stage 3, GFR 30-59 ml/min ([facility]) 6/20/2016    COPD with exacerbation ([facility]) 6/15/2016    Hyperlipidemia     Hypertension     NSTEMI (non-ST elevated myocardial infarction) ([facility])     Obesity (BMI 35.0-39.9 without

## 2025-06-23 NOTE — PLAN OF CARE
89/F admitted for lower abdominal pain, unclear etiology at this time but imaging negative for acute intra-abdominal process.  Her LFTs and CBC are unremarkable and lipase is normal.      This morning she is ANO x 3.  Reports some soreness in the lower abdominal area.  Has nausea and poor appetite for the past 3 weeks which is unusual for her.  Takes Zofran at home with no improvement in symptoms.  Reports GERD like symptoms.     On exam she has lower abdominal/suprapubic tenderness.  Will order urinalysis with reflex culture, follow-up results.    On my review of her abdominal imaging there seems to be a lot of stool throughout and even near the rectum. Started on Senna 2 tabs nightly, MiraLAX daily for 4 days, suppository as needed.  Increase bowel regimen as needed.  Start Compazine 3 times daily with meals scheduled.  Maalox and Tums as needed.     Dispo: Family request [facility] today.

## 2025-07-26 ENCOUNTER — HOSPITAL ENCOUNTER (INPATIENT)
Age: 89
LOS: 1 days | Discharge: NURSING, REHAB OR LONG-TERM CARE | DRG: 683 | End: 2025-07-29
Attending: STUDENT IN AN ORGANIZED HEALTH CARE EDUCATION/TRAINING PROGRAM | Admitting: NURSE PRACTITIONER
Payer: MEDICARE

## 2025-07-26 DIAGNOSIS — R31.9 HEMATURIA, UNSPECIFIED TYPE: Primary | ICD-10-CM

## 2025-07-26 DIAGNOSIS — N17.9 AKI (ACUTE KIDNEY INJURY): ICD-10-CM

## 2025-07-26 LAB
ANION GAP SERPL CALC-SCNC: 12 MEQ/L (ref 8–16)
BACTERIA URNS QL MICRO: ABNORMAL /HPF
BASOPHILS ABSOLUTE: 0 THOU/MM3 (ref 0–0.1)
BASOPHILS NFR BLD AUTO: 0.5 %
BILIRUB UR QL STRIP.AUTO: NEGATIVE
BUN SERPL-MCNC: 46 MG/DL (ref 8–23)
CALCIUM SERPL-MCNC: 8.6 MG/DL (ref 8.2–9.6)
CASTS #/AREA URNS LPF: ABNORMAL /LPF
CASTS 2: ABNORMAL /LPF
CHARACTER UR: ABNORMAL
CHLORIDE SERPL-SCNC: 103 MEQ/L (ref 98–111)
CO2 SERPL-SCNC: 24 MEQ/L (ref 22–29)
COLOR, UA: YELLOW
CREAT SERPL-MCNC: 2.5 MG/DL (ref 0.7–1.2)
CREAT UR-MCNC: 91.3 MG/DL
CRYSTALS URNS MICRO: ABNORMAL
DEPRECATED RDW RBC AUTO: 51.6 FL (ref 35–45)
EOSINOPHIL NFR BLD AUTO: 5.6 %
EOSINOPHILS ABSOLUTE: 0.5 THOU/MM3 (ref 0–0.4)
EPITHELIAL CELLS, UA: ABNORMAL /HPF
ERYTHROCYTE [DISTWIDTH] IN BLOOD BY AUTOMATED COUNT: 13.5 % (ref 11.5–14.5)
FERRITIN SERPL IA-MCNC: 273 NG/ML (ref 30–400)
FOLATE SERPL-MCNC: 15 NG/ML (ref 4.6–34.8)
GFR SERPL CREATININE-BSD FRML MDRD: 24 ML/MIN/1.73M2
GLUCOSE SERPL-MCNC: 103 MG/DL (ref 74–109)
GLUCOSE UR QL STRIP.AUTO: NEGATIVE MG/DL
HCT VFR BLD AUTO: 33.5 % (ref 42–52)
HGB BLD-MCNC: 10.4 GM/DL (ref 14–18)
HGB UR QL STRIP.AUTO: ABNORMAL
IMM GRANULOCYTES # BLD AUTO: 0.04 THOU/MM3 (ref 0–0.07)
IMM GRANULOCYTES NFR BLD AUTO: 0.4 %
IRON SATN MFR SERPL: 11 % (ref 20–50)
IRON SERPL-MCNC: 22 UG/DL (ref 61–157)
KETONES UR QL STRIP.AUTO: NEGATIVE
LYMPHOCYTES ABSOLUTE: 1.5 THOU/MM3 (ref 1–4.8)
LYMPHOCYTES NFR BLD AUTO: 15.2 %
MCH RBC QN AUTO: 32.3 PG (ref 26–33)
MCHC RBC AUTO-ENTMCNC: 31 GM/DL (ref 32.2–35.5)
MCV RBC AUTO: 104 FL (ref 80–94)
MISCELLANEOUS 2: ABNORMAL
MONOCYTES ABSOLUTE: 0.9 THOU/MM3 (ref 0.4–1.3)
MONOCYTES NFR BLD AUTO: 8.9 %
NEUTROPHILS ABSOLUTE: 6.7 THOU/MM3 (ref 1.8–7.7)
NEUTROPHILS NFR BLD AUTO: 69.4 %
NITRITE UR QL STRIP: NEGATIVE
NRBC BLD AUTO-RTO: 0 /100 WBC
OSMOLALITY SERPL CALC.SUM OF ELEC: 289.7 MOSMOL/KG (ref 275–300)
PH UR STRIP.AUTO: 5 [PH] (ref 5–9)
PLATELET # BLD AUTO: 262 THOU/MM3 (ref 130–400)
PMV BLD AUTO: 10 FL (ref 9.4–12.4)
POTASSIUM SERPL-SCNC: 4.8 MEQ/L (ref 3.5–5.2)
PROT UR STRIP.AUTO-MCNC: ABNORMAL MG/DL
RBC # BLD AUTO: 3.22 MILL/MM3 (ref 4.7–6.1)
RBC URINE: ABNORMAL /HPF
RENAL EPI CELLS #/AREA URNS HPF: ABNORMAL /[HPF]
SODIUM SERPL-SCNC: 139 MEQ/L (ref 135–145)
SP GR UR REFRACT.AUTO: 1.02 (ref 1–1.03)
TIBC SERPL-MCNC: 194 UG/DL (ref 171–450)
UROBILINOGEN, URINE: 0.2 EU/DL (ref 0–1)
UUN 24H UR-MCNC: 709 MG/DL
VIT B12 SERPL-MCNC: 324 PG/ML (ref 232–1245)
WBC # BLD AUTO: 9.6 THOU/MM3 (ref 4.8–10.8)
WBC #/AREA URNS HPF: > 200 /HPF
WBC #/AREA URNS HPF: ABNORMAL /[HPF]
YEAST LIKE FUNGI URNS QL MICRO: ABNORMAL

## 2025-07-26 PROCEDURE — 82728 ASSAY OF FERRITIN: CPT

## 2025-07-26 PROCEDURE — 2580000003 HC RX 258

## 2025-07-26 PROCEDURE — 36415 COLL VENOUS BLD VENIPUNCTURE: CPT

## 2025-07-26 PROCEDURE — 82570 ASSAY OF URINE CREATININE: CPT

## 2025-07-26 PROCEDURE — 6360000002 HC RX W HCPCS

## 2025-07-26 PROCEDURE — 81001 URINALYSIS AUTO W/SCOPE: CPT

## 2025-07-26 PROCEDURE — 82746 ASSAY OF FOLIC ACID SERUM: CPT

## 2025-07-26 PROCEDURE — 80048 BASIC METABOLIC PNL TOTAL CA: CPT

## 2025-07-26 PROCEDURE — 83540 ASSAY OF IRON: CPT

## 2025-07-26 PROCEDURE — G0378 HOSPITAL OBSERVATION PER HR: HCPCS

## 2025-07-26 PROCEDURE — 96374 THER/PROPH/DIAG INJ IV PUSH: CPT

## 2025-07-26 PROCEDURE — 87086 URINE CULTURE/COLONY COUNT: CPT

## 2025-07-26 PROCEDURE — 96361 HYDRATE IV INFUSION ADD-ON: CPT

## 2025-07-26 PROCEDURE — 85025 COMPLETE CBC W/AUTO DIFF WBC: CPT

## 2025-07-26 PROCEDURE — 83550 IRON BINDING TEST: CPT

## 2025-07-26 PROCEDURE — 99285 EMERGENCY DEPT VISIT HI MDM: CPT

## 2025-07-26 PROCEDURE — 84540 ASSAY OF URINE/UREA-N: CPT

## 2025-07-26 PROCEDURE — 82607 VITAMIN B-12: CPT

## 2025-07-26 PROCEDURE — 2500000003 HC RX 250 WO HCPCS

## 2025-07-26 PROCEDURE — 51702 INSERT TEMP BLADDER CATH: CPT

## 2025-07-26 RX ORDER — SODIUM CHLORIDE 0.9 % (FLUSH) 0.9 %
5-40 SYRINGE (ML) INJECTION EVERY 12 HOURS SCHEDULED
Status: DISCONTINUED | OUTPATIENT
Start: 2025-07-26 | End: 2025-07-29 | Stop reason: HOSPADM

## 2025-07-26 RX ORDER — POTASSIUM CHLORIDE 1500 MG/1
40 TABLET, EXTENDED RELEASE ORAL PRN
Status: DISCONTINUED | OUTPATIENT
Start: 2025-07-26 | End: 2025-07-26

## 2025-07-26 RX ORDER — ACETAMINOPHEN 650 MG/1
650 SUPPOSITORY RECTAL EVERY 6 HOURS PRN
Status: DISCONTINUED | OUTPATIENT
Start: 2025-07-26 | End: 2025-07-29 | Stop reason: HOSPADM

## 2025-07-26 RX ORDER — FUROSEMIDE 40 MG/1
40 TABLET ORAL DAILY
Status: DISCONTINUED | OUTPATIENT
Start: 2025-07-27 | End: 2025-07-28

## 2025-07-26 RX ORDER — SODIUM CHLORIDE 0.9 % (FLUSH) 0.9 %
5-40 SYRINGE (ML) INJECTION PRN
Status: DISCONTINUED | OUTPATIENT
Start: 2025-07-26 | End: 2025-07-29 | Stop reason: HOSPADM

## 2025-07-26 RX ORDER — ACETAMINOPHEN 325 MG/1
650 TABLET ORAL EVERY 6 HOURS PRN
Status: DISCONTINUED | OUTPATIENT
Start: 2025-07-26 | End: 2025-07-29 | Stop reason: HOSPADM

## 2025-07-26 RX ORDER — POLYETHYLENE GLYCOL 3350 17 G/17G
17 POWDER, FOR SOLUTION ORAL DAILY PRN
Status: DISCONTINUED | OUTPATIENT
Start: 2025-07-26 | End: 2025-07-29 | Stop reason: HOSPADM

## 2025-07-26 RX ORDER — SODIUM CHLORIDE 9 MG/ML
INJECTION, SOLUTION INTRAVENOUS PRN
Status: DISCONTINUED | OUTPATIENT
Start: 2025-07-26 | End: 2025-07-29 | Stop reason: HOSPADM

## 2025-07-26 RX ORDER — ONDANSETRON 4 MG/1
4 TABLET, ORALLY DISINTEGRATING ORAL EVERY 8 HOURS PRN
Status: DISCONTINUED | OUTPATIENT
Start: 2025-07-26 | End: 2025-07-29 | Stop reason: HOSPADM

## 2025-07-26 RX ORDER — POTASSIUM CHLORIDE 7.45 MG/ML
10 INJECTION INTRAVENOUS PRN
Status: DISCONTINUED | OUTPATIENT
Start: 2025-07-26 | End: 2025-07-26

## 2025-07-26 RX ORDER — SODIUM CHLORIDE 9 MG/ML
INJECTION, SOLUTION INTRAVENOUS CONTINUOUS
Status: DISCONTINUED | OUTPATIENT
Start: 2025-07-26 | End: 2025-07-27

## 2025-07-26 RX ORDER — ONDANSETRON 2 MG/ML
4 INJECTION INTRAMUSCULAR; INTRAVENOUS EVERY 6 HOURS PRN
Status: DISCONTINUED | OUTPATIENT
Start: 2025-07-26 | End: 2025-07-29 | Stop reason: HOSPADM

## 2025-07-26 RX ORDER — MAGNESIUM SULFATE IN WATER 40 MG/ML
2000 INJECTION, SOLUTION INTRAVENOUS PRN
Status: DISCONTINUED | OUTPATIENT
Start: 2025-07-26 | End: 2025-07-26

## 2025-07-26 RX ADMIN — SODIUM CHLORIDE: 0.9 INJECTION, SOLUTION INTRAVENOUS at 22:00

## 2025-07-26 RX ADMIN — WATER 2000 MG: 1 INJECTION INTRAMUSCULAR; INTRAVENOUS; SUBCUTANEOUS at 22:09

## 2025-07-26 RX ADMIN — SODIUM CHLORIDE, PRESERVATIVE FREE 10 ML: 5 INJECTION INTRAVENOUS at 22:02

## 2025-07-26 NOTE — ED NOTES
ED to inpatient nurses report      Chief Complaint:  Chief Complaint   Patient presents with    Hematuria     Present to ED from: nursing home     MOA:     LOC: alert and orientated to name and place  Mobility: Requires assistance * 2  Oxygen Baseline: RA    Current needs required: RA     Code Status:   Prior    What abnormal results were found and what did you give/do to treat them? Labs, hematuria   Any procedures or intervention occur? See chart     Mental Status:  Level of Consciousness: Alert (0)    Psych Assessment:        Vitals:  Patient Vitals for the past 24 hrs:   BP Temp Temp src Pulse Resp SpO2   07/26/25 1949 -- -- -- -- -- 95 %   07/26/25 1947 (!) 153/51 -- -- -- -- --   07/26/25 1917 (!) 154/64 -- -- -- -- 93 %   07/26/25 1830 (!) 152/68 -- -- -- -- --   07/26/25 1821 -- -- -- -- -- 98 %   07/26/25 1725 -- 98.4 °F (36.9 °C) Oral 78 17 --   07/26/25 1723 -- -- -- -- -- 92 %   07/26/25 1722 (!) 160/59 -- -- -- -- 93 %        LDAs:      Ambulatory Status:  Presents to emergency department  because of falls (Syncope, seizure, or loss of consciousness): No, Age > 70: Yes, Altered Mental Status, Intoxication with alcohol or substance confusion (Disorientation, impaired judgment, poor safety awaremess, or inability to follow instructions): No, Impaired Mobility: Ambulates or transfers with assistive devices or assistance; Unable to ambulate or transer.: Yes, Nursing Judgement: Yes    Diagnosis:  DISPOSITION Decision To Admit 07/26/2025 07:51:02 PM   Final diagnoses:   Hematuria, unspecified type   MOON (acute kidney injury)        Consults:  None     Pain Score:       C-SSRS:   Risk of Suicide: No Risk    Sepsis Screening:       Leticia Fall Risk:  Stanford 1 Fall Risk  Presents to emergency department  because of falls (Syncope, seizure, or loss of consciousness): No  Age > 70: Yes  Altered Mental Status, Intoxication with alcohol or substance confusion (Disorientation, impaired judgment, poor safety

## 2025-07-27 PROBLEM — N30.01 ACUTE CYSTITIS WITH HEMATURIA: Status: ACTIVE | Noted: 2025-07-27

## 2025-07-27 PROBLEM — R31.9 HEMATURIA: Status: ACTIVE | Noted: 2025-07-27

## 2025-07-27 LAB
ANION GAP SERPL CALC-SCNC: 12 MEQ/L (ref 8–16)
BASOPHILS ABSOLUTE: 0.1 THOU/MM3 (ref 0–0.1)
BASOPHILS NFR BLD AUTO: 0.7 %
BUN SERPL-MCNC: 36 MG/DL (ref 8–23)
CALCIUM SERPL-MCNC: 8.4 MG/DL (ref 8.2–9.6)
CHLORIDE SERPL-SCNC: 106 MEQ/L (ref 98–111)
CO2 SERPL-SCNC: 22 MEQ/L (ref 22–29)
CREAT SERPL-MCNC: 1.8 MG/DL (ref 0.7–1.2)
DEPRECATED RDW RBC AUTO: 55.1 FL (ref 35–45)
EOSINOPHIL NFR BLD AUTO: 9.3 %
EOSINOPHILS ABSOLUTE: 0.7 THOU/MM3 (ref 0–0.4)
ERYTHROCYTE [DISTWIDTH] IN BLOOD BY AUTOMATED COUNT: 13.6 % (ref 11.5–14.5)
GFR SERPL CREATININE-BSD FRML MDRD: 35 ML/MIN/1.73M2
GLUCOSE SERPL-MCNC: 85 MG/DL (ref 74–109)
HCT VFR BLD AUTO: 35.6 % (ref 42–52)
HGB BLD-MCNC: 10.4 GM/DL (ref 14–18)
IMM GRANULOCYTES # BLD AUTO: 0.04 THOU/MM3 (ref 0–0.07)
IMM GRANULOCYTES NFR BLD AUTO: 0.5 %
LYMPHOCYTES ABSOLUTE: 1.2 THOU/MM3 (ref 1–4.8)
LYMPHOCYTES NFR BLD AUTO: 16.1 %
MCH RBC QN AUTO: 32 PG (ref 26–33)
MCHC RBC AUTO-ENTMCNC: 29.2 GM/DL (ref 32.2–35.5)
MCV RBC AUTO: 109.5 FL (ref 80–94)
MONOCYTES ABSOLUTE: 0.7 THOU/MM3 (ref 0.4–1.3)
MONOCYTES NFR BLD AUTO: 8.7 %
NEUTROPHILS ABSOLUTE: 4.9 THOU/MM3 (ref 1.8–7.7)
NEUTROPHILS NFR BLD AUTO: 64.7 %
NRBC BLD AUTO-RTO: 0 /100 WBC
OSMOLALITY SERPL CALC.SUM OF ELEC: 287 MOSMOL/KG (ref 275–300)
PLATELET # BLD AUTO: 240 THOU/MM3 (ref 130–400)
PMV BLD AUTO: 10.1 FL (ref 9.4–12.4)
POTASSIUM SERPL-SCNC: 5 MEQ/L (ref 3.5–5.2)
RBC # BLD AUTO: 3.25 MILL/MM3 (ref 4.7–6.1)
SODIUM SERPL-SCNC: 140 MEQ/L (ref 135–145)
WBC # BLD AUTO: 7.6 THOU/MM3 (ref 4.8–10.8)

## 2025-07-27 PROCEDURE — 6370000000 HC RX 637 (ALT 250 FOR IP)

## 2025-07-27 PROCEDURE — 2500000003 HC RX 250 WO HCPCS

## 2025-07-27 PROCEDURE — 80048 BASIC METABOLIC PNL TOTAL CA: CPT

## 2025-07-27 PROCEDURE — 6370000000 HC RX 637 (ALT 250 FOR IP): Performed by: INTERNAL MEDICINE

## 2025-07-27 PROCEDURE — 96376 TX/PRO/DX INJ SAME DRUG ADON: CPT

## 2025-07-27 PROCEDURE — 36415 COLL VENOUS BLD VENIPUNCTURE: CPT

## 2025-07-27 PROCEDURE — 6360000002 HC RX W HCPCS

## 2025-07-27 PROCEDURE — 99223 1ST HOSP IP/OBS HIGH 75: CPT | Performed by: INTERNAL MEDICINE

## 2025-07-27 PROCEDURE — 96361 HYDRATE IV INFUSION ADD-ON: CPT

## 2025-07-27 PROCEDURE — G0378 HOSPITAL OBSERVATION PER HR: HCPCS

## 2025-07-27 PROCEDURE — 85025 COMPLETE CBC W/AUTO DIFF WBC: CPT

## 2025-07-27 PROCEDURE — 2580000003 HC RX 258: Performed by: INTERNAL MEDICINE

## 2025-07-27 RX ORDER — METOPROLOL TARTRATE 50 MG
50 TABLET ORAL 2 TIMES DAILY
Status: DISCONTINUED | OUTPATIENT
Start: 2025-07-27 | End: 2025-07-29 | Stop reason: HOSPADM

## 2025-07-27 RX ORDER — FOLIC ACID 1 MG/1
1 TABLET ORAL DAILY
Status: DISCONTINUED | OUTPATIENT
Start: 2025-07-27 | End: 2025-07-29 | Stop reason: HOSPADM

## 2025-07-27 RX ORDER — ATORVASTATIN CALCIUM 20 MG/1
20 TABLET, FILM COATED ORAL NIGHTLY
Status: DISCONTINUED | OUTPATIENT
Start: 2025-07-27 | End: 2025-07-29 | Stop reason: HOSPADM

## 2025-07-27 RX ORDER — FINASTERIDE 5 MG/1
5 TABLET, FILM COATED ORAL DAILY
Status: DISCONTINUED | OUTPATIENT
Start: 2025-07-27 | End: 2025-07-29 | Stop reason: HOSPADM

## 2025-07-27 RX ORDER — FAMOTIDINE 20 MG/1
20 TABLET, FILM COATED ORAL DAILY
Status: DISCONTINUED | OUTPATIENT
Start: 2025-07-27 | End: 2025-07-29 | Stop reason: HOSPADM

## 2025-07-27 RX ORDER — TAMSULOSIN HYDROCHLORIDE 0.4 MG/1
0.4 CAPSULE ORAL DAILY
Status: DISCONTINUED | OUTPATIENT
Start: 2025-07-27 | End: 2025-07-29 | Stop reason: HOSPADM

## 2025-07-27 RX ORDER — SODIUM CHLORIDE 9 MG/ML
INJECTION, SOLUTION INTRAVENOUS CONTINUOUS
Status: ACTIVE | OUTPATIENT
Start: 2025-07-27 | End: 2025-07-28

## 2025-07-27 RX ORDER — ASPIRIN 325 MG
325 TABLET ORAL DAILY
Status: DISCONTINUED | OUTPATIENT
Start: 2025-07-27 | End: 2025-07-29 | Stop reason: HOSPADM

## 2025-07-27 RX ADMIN — SODIUM CHLORIDE, PRESERVATIVE FREE 10 ML: 5 INJECTION INTRAVENOUS at 20:25

## 2025-07-27 RX ADMIN — TAMSULOSIN HYDROCHLORIDE 0.4 MG: 0.4 CAPSULE ORAL at 08:24

## 2025-07-27 RX ADMIN — SODIUM CHLORIDE, PRESERVATIVE FREE 10 ML: 5 INJECTION INTRAVENOUS at 08:30

## 2025-07-27 RX ADMIN — WATER 2000 MG: 1 INJECTION INTRAMUSCULAR; INTRAVENOUS; SUBCUTANEOUS at 20:24

## 2025-07-27 RX ADMIN — SODIUM CHLORIDE: 0.9 INJECTION, SOLUTION INTRAVENOUS at 08:23

## 2025-07-27 RX ADMIN — FOLIC ACID 1 MG: 1 TABLET ORAL at 20:24

## 2025-07-27 RX ADMIN — METOPROLOL TARTRATE 50 MG: 50 TABLET, FILM COATED ORAL at 21:09

## 2025-07-27 RX ADMIN — FINASTERIDE 5 MG: 5 TABLET, FILM COATED ORAL at 08:24

## 2025-07-27 RX ADMIN — SODIUM CHLORIDE: 0.9 INJECTION, SOLUTION INTRAVENOUS at 20:41

## 2025-07-27 RX ADMIN — METOPROLOL TARTRATE 50 MG: 50 TABLET, FILM COATED ORAL at 08:24

## 2025-07-27 RX ADMIN — ATORVASTATIN CALCIUM 20 MG: 20 TABLET, FILM COATED ORAL at 21:10

## 2025-07-27 RX ADMIN — FAMOTIDINE 20 MG: 20 TABLET, FILM COATED ORAL at 08:24

## 2025-07-27 NOTE — PROGRESS NOTES
Hospitalist Progress Note      Patient:  Walter Ochoa    Unit/Bed:8A-06/006-A  YOB: 1934  MRN: [id number]   Acct: [id number]   PCP: Nael Parks APRN - CNP  Date of Admission: 7/26/2025    Assessment/Plan:    MOON on CKD IIIa  Creatinine peak of 2.5, downtrending  Continue gentle fluids  Monitor for now  Hold diuretics and bactrim    Hematuria  UTI   History of BPH  Continue CTX pending urine culture results  Hematuria resolved, likely trauma from marie  Continue flomax.     Atrial flutter  Rate controlled. Continue lopressor  No longer on oral AC    CAD status post CABG  Hypertension, hyperlipidemia  No anginal sx.   Holding ASA d/t hematuria  Continue lipitor     Heart failure with mildly reduced EF  On room air. Does not appear to be markedly hypervolemic  Holding lasix d/t MOON    Chronic  Chronic macrocytic anemia: Hb stabl  Osteoarthritis: noted  ARGENIS: home CPAP  Reported history of COPD: on room air.   GERD: continue pepcid     Chief Complaint: hematuria     Initial H and P:-    Walter Ochoa is a 89 y.o. male with PMHx of HFmrEF, A. Flutter, CAD s/p CABG x3, COPD, CKD, BPH, HTN who presents to [facility] on 7/26 with complaints of hematuria in the marie bag. Pt was started on bactrim on Sunday 7/20 due to fevers and concerns for UTI. Pt also reports having 4 loose bowel movements yesterday 7/25, now resolved likely due to abx. Daughter was present bedside and she reports pt had clear marie morning of admission, however she was informed in the afternoon that there was blood present in the marie. After talking to the ED staff, we were informed the marie had clots and brown in color with a foul smell which also raises concerns for a UTI. Pt has had a marie placed on 6/21 for chronic urinary retention and it has not been switched, the marie was hanging on the side without any strapping which could be

## 2025-07-27 NOTE — PLAN OF CARE
Problem: Chronic Conditions and Co-morbidities  Goal: Patient's chronic conditions and co-morbidity symptoms are monitored and maintained or improved  7/27/2025 1952 by Myla Warner RN  Outcome: Progressing  7/27/2025 1005 by Surekha Aguiar RN  Outcome: Progressing  Flowsheets (Taken 7/27/2025 0823)  Care Plan - Patient's Chronic Conditions and Co-Morbidity Symptoms are Monitored and Maintained or Improved: Monitor and assess patient's chronic conditions and comorbid symptoms for stability, deterioration, or improvement     Problem: Discharge Planning  Goal: Discharge to home or other facility with appropriate resources  7/27/2025 1952 by Myla Warner RN  Outcome: Progressing  7/27/2025 1005 by Surekha Aguiar RN  Outcome: Progressing  Flowsheets  Taken 7/27/2025 0823 by Surekha Aguiar RN  Discharge to home or other facility with appropriate resources: Identify barriers to discharge with patient and caregiver  Taken 7/26/2025 2257 by Myla Warner RN  Discharge to home or other facility with appropriate resources:   Identify barriers to discharge with patient and caregiver   Arrange for needed discharge resources and transportation as appropriate     Problem: Skin/Tissue Integrity  Goal: Skin integrity remains intact  Description: 1.  Monitor for areas of redness and/or skin breakdown  2.  Assess vascular access sites hourly  3.  Every 4-6 hours minimum:  Change oxygen saturation probe site  4.  Every 4-6 hours:  If on nasal continuous positive airway pressure, respiratory therapy assess nares and determine need for appliance change or resting period  7/27/2025 1952 by Myla Warner RN  Outcome: Progressing  7/27/2025 1005 by Surekha Aguiar RN  Outcome: Progressing  Flowsheets (Taken 7/27/2025 0823)  Skin Integrity Remains Intact: Monitor for areas of redness and/or skin breakdown     Problem: ABCDS Injury Assessment  Goal: Absence of physical injury  7/27/2025 1952 by Myla Warner

## 2025-07-27 NOTE — NURSE NAVIGATOR
Code: DNRCC   Consults: Hospitalist,     Situation:   Date  Narrative    06/19 Previous Admission: Intersititial Pneumonitis and had marie placed 6/21 for chronic urinary retention. DC to [facility] nursing and to follow up with urology.      07/26 To ED c/ Hematuria and chronic Marie, Fever.  Pt has not had marie exchanged since it was put in.     UA - UTI   Bactrim Abx given in ED  Marie exchanged in ED    07/27 Clear Yellow Urine      Hx: Obesity, Aflutter, CHF, CAD, COPD, CABG x3 2016, BPH, CKD Stage III, HLD, HTN, Arthritis, Skin CA, NSTEMI, ARGENIS uses CPAP, TIA  Dx: UTI, Chronic Anemia,   Assess: AO4, Forgetful,     IV: 22g L FA   Drains: Coude Marie,   Continuous: NS 75ml/hr   Diet: Dysphagia Minced and Moist   Wounds: Red scrotum and coccyx, barrium cream, old skin tear to arm and BL legs     Aware:   Whole Meds   From [facility]       To Do:   Heels off bed  SCD  Q2 Turn     Note to patient: The 21st Century Cures Act requires that medical notes like this one to be available to patients in the interest of transparency. Please be advised, this is a medical document. It is intended as [id number] communication. It is written in medical language and may contain abbreviations and verbiage that may be unfamiliar. It may appear to read blunt or direct. Medical documents are intended to carry relevant medical information, facts as evident and the clinical opinion of the practitioner.

## 2025-07-27 NOTE — DISCHARGE INSTR - COC
Continuity of Care Form    Patient Name: Walter Ochoa   :  2/10/1934  MRN:  [id number]    Admit date:  2025  Discharge date:  25    Code Status Order: DNR-CC   Advance Directives:     Admitting Physician:  Stef Cole MD  PCP: Nael Parks, APRN - CNP    Discharging Nurse: Jacki BLANCO   Discharging Hospital Unit/Room#: 8A-06/006-A  Discharging Unit Phone Number: [phone]    Emergency Contact:   Extended Emergency Contact Information  Primary Emergency Contact: Clive Ochoa  Address: [address] [place] [address]  Home Phone: [phone]  Mobile Phone: [phone]  Relation: Child   needed? No  Secondary Emergency Contact: Lili Brown  Address: [address]           [place] [address]  Home Phone: [phone]  Mobile Phone: [phone]  Relation: Child   needed? No    Past Surgical History:  Past Surgical History:   Procedure Laterality Date    CARDIAC SURGERY  2016    triple by pass    CYSTOSCOPY  2017    Right Ureteroscopy, Basket Retrieval of Stones, Stent - Dr. Scherer    EYE SURGERY  ??    bilateral cataracts    MOHS SURGERY Left 2018    Mohs Repair BCC Left [place]     MD UNLISTED PROCEDURE EXTERNAL EAR Left 2018    MOHS REPAIR BCC LEFT EARLOBE performed by Regan Kaba MD at [facility]    SKIN BIOPSY         Immunization History:   Immunization History   Administered Date(s) Administered    COVID-19, PFIZER PURPLE top, DILUTE for use, (age 12 y+), 30mcg/0.3mL 2021, 2021, 2022    DT (pediatric) 2021    Influenza 09/10/2013    Influenza Vaccine, unspecified formulation 10/19/2016    Influenza Virus Vaccine 09/10/2013, 2018    Influenza, FLUAD, (age 65 y+), IM, Trivalent PF, 0.5mL 2018, 10/09/2019    Influenza, FLUCELVAX, (age 6 mo+), MDCK, Quadv PF, 0.5mL 2017    Influenza, FLUZONE High Dose (age 65 y+), IM, Quadv, 0.7mL 2020, 10/11/2021,

## 2025-07-27 NOTE — PROGRESS NOTES
Pharmacy Dose Adjustment - Ceftriaxone    Patients excluded from an automatic dose change:  Orders placed by infectious disease provider - pharmacist will contact the provider  Pediatric patients  Doses ordered for surgical prophylaxis    Wt Readings from Last 1 Encounters:   06/21/25 124.5 kg (274 lb 7.6 oz)     There is no height or weight on file to calculate BMI.  BMI 41.73    Site/Type of Infection Dose and Route Frequency   Bacteremia, Critically ill, Intra-abdominal infection, Osteomyelitis/septic arthritis, Prosthetic joint infections   2000 mg IV Every 24 hours   Pneumonia, Skin and soft tissue infections, Urinary tract infections 1000 mg IV * Every 24 hours   Meningitis and CNS infections 2000 mg IV Every 12 hours   Endocarditis: (Enterococcus synergy) 2000 mg IV Every 12 hours   Endocarditis: (non-Enterococcus synergy) 2000 mg IV Every 24 hours   Spontaneous bacterial peritonitis: prophylaxis  1000 mg IV * Every 24 hours   Spontaneous bacterial peritonitis: treatment 2000 mg IV  Every 24 hours   Gonorrhea < 150 kg 500 mg IM       Once   Gonorrhea >= 150 kg 1000 mg IM       Once   Uncomplicated disseminated gonorrhea 1000 mg IV or IM Every 24 hours     *Use 2000 mg dose for all patients with BMI >= 30 kg/m2    Plan:  Ceftriaxone dose updated to 2000 mg every 24 hours for Urinary Tract Infection.    Please call pharmacy with questions.     Jayde Martins, HavenD  7/26/2025 9:03 PM

## 2025-07-27 NOTE — H&P
atrovastatin 20 mg nightly  HTN: c/w metoprolol 50 mg Bid  HFmrEF: EF 40-45% on 4/19/2019, Hold lasix 40 mg in the setting of MOON. Not on home GDMT. F/U TTE  BPH: c/w finasteride 5 mg daily and flomax 0.4 mg daily  Arthritis: gets regular outpatient cortisone injections in BL knees  ARGENIS: c/w CPAP   Hx of COPD: no pfts on file      Data reviewed (unless otherwise discussed in assessment/plan)  EKG:  I have reviewed the EKG with the following interpretation: N/A  Imaging: I have reviewed imaging with the following interpretation: N/A  Labs: Reviewed, see chart and plan above.       =======================================================================    SUBJECTIVE    Chief Complaint:  Hematuria    History Of Present Illness:  Walter Ochoa is a 89 y.o. male with PMHx of HFmrEF, A. Flutter, CAD s/p CABG x3, COPD, CKD, BPH, HTN who presents to [facility] on 7/26 with complaints of hematuria in the marie bag. Pt was started on bactrim on Sunday 7/20 due to fevers and concerns for UTI. Pt also reports having 4 loose bowel movements yesterday 7/25, now resolved likely due to abx. Daughter was present bedside and she reports pt had clear marie morning of admission, however she was informed in the afternoon that there was blood present in the marie. After talking to the ED staff, we were informed the marie had clots and brown in color with a foul smell which also raises concerns for a UTI. Pt has had a marie placed on 6/21 for chronic urinary retention and it has not been switched, the marie was hanging on the side without any strapping which could be concerning for trauma. On exam, pt is found to be pleasantly confused. Endorses no pain on abdominal palpation. Pt is DNR CC. UA with large blood, large leukocyte esterase, cloudy, >200 wbcs, hyaline casts 4-8. GFR is reduced from baseline 63 to 24 with increase inc Cr from baseline of 1.1-1.2 to 2.5 at admission raising concerns for MOON. Will admit the

## 2025-07-27 NOTE — PLAN OF CARE
Problem: Chronic Conditions and Co-morbidities  Goal: Patient's chronic conditions and co-morbidity symptoms are monitored and maintained or improved  7/27/2025 1005 by Surekha Aguiar RN  Outcome: Progressing  Flowsheets (Taken 7/27/2025 0823)  Care Plan - Patient's Chronic Conditions and Co-Morbidity Symptoms are Monitored and Maintained or Improved: Monitor and assess patient's chronic conditions and comorbid symptoms for stability, deterioration, or improvement  7/26/2025 2247 by Myla Warner RN  Outcome: Progressing  Flowsheets (Taken 7/26/2025 2200)  Care Plan - Patient's Chronic Conditions and Co-Morbidity Symptoms are Monitored and Maintained or Improved: Monitor and assess patient's chronic conditions and comorbid symptoms for stability, deterioration, or improvement     Problem: Discharge Planning  Goal: Discharge to home or other facility with appropriate resources  7/27/2025 1005 by Surekha Aguiar RN  Outcome: Progressing  Flowsheets  Taken 7/27/2025 0823 by Surekha Aguiar RN  Discharge to home or other facility with appropriate resources: Identify barriers to discharge with patient and caregiver  Taken 7/26/2025 2257 by Myla Warner RN  Discharge to home or other facility with appropriate resources:   Identify barriers to discharge with patient and caregiver   Arrange for needed discharge resources and transportation as appropriate  7/26/2025 2247 by Myla Warner RN  Outcome: Progressing     Problem: Skin/Tissue Integrity  Goal: Skin integrity remains intact  Description: 1.  Monitor for areas of redness and/or skin breakdown  2.  Assess vascular access sites hourly  3.  Every 4-6 hours minimum:  Change oxygen saturation probe site  4.  Every 4-6 hours:  If on nasal continuous positive airway pressure, respiratory therapy assess nares and determine need for appliance change or resting period  7/27/2025 1005 by Surekha Aguiar RN  Outcome: Progressing  Flowsheets (Taken 7/27/2025

## 2025-07-27 NOTE — ED PROVIDER NOTES
Transfer of Care Note:   Physician Signing out: stephani Borges  Receiving Physician: Omar Castellanos MD  Sign out time: 8Pm      Brief history:  89-year-old male patient with chief complaint of hematuria from SNF.  Patient was diagnosed with a UTI received antibiotics now is seeing blood in Clements.  Patient is on comfort care.  MOON with 2.2 creatinine.  Past medical history of CHF.    Items pending that need to be checked:  Follow up admission       Tentative Impression of patient:  89-year-old male patient with hematuria    Expected disposition of patient:  Pending results, admitted.        Additional Assessment and results:   I have personally performed a face to face diagnostic evaluation on this patient. The patient's initial evaluation and plan have been discussed with the prior physician who initially evaluated the patient. Nursing Notes, Past Medical Hx, Past Surgical Hx, Social Hx, Allergies, vital signs and Family Hx were all reviewed.      Vitals:    07/26/25 1949   BP:    Pulse:    Resp:    Temp:    SpO2: 95%     Physical Exam      Labs Reviewed   CBC WITH AUTO DIFFERENTIAL - Abnormal; Notable for the following components:       Result Value    RBC 3.22 (*)     Hemoglobin 10.4 (*)     Hematocrit 33.5 (*)     .0 (*)     MCHC 31.0 (*)     RDW-SD 51.6 (*)     Eosinophils Absolute 0.5 (*)     All other components within normal limits   BASIC METABOLIC PANEL W/ REFLEX TO MG FOR LOW K - Abnormal; Notable for the following components:    BUN 46 (*)     Creatinine 2.5 (*)     All other components within normal limits   URINE WITH REFLEXED MICRO - Abnormal; Notable for the following components:    Blood, Urine LARGE (*)     Protein, UA TRACE (*)     Leukocyte Esterase, Urine LARGE (*)     Character, Urine CLOUDY (*)     All other components within normal limits   GLOMERULAR FILTRATION RATE, ESTIMATED - Abnormal; Notable for the following components:    Est, Glom Filt Rate 24 (*)     All other components within 
Making  Walter Ochoa is a 89 y.o. male with PMH of obesity, aflutter, CHF, CAD, COPD s/p CABG x 3, BPH, CKD, COPD, HTL, HTN who presents to the emergency department for evaluation of hematuria in the marie catheter bag. He was found to have a fever on Sunday and was diagnosed with a UTI given Bactrim antibiotics.  Daughter at bedside and endorses that yesterday patient also had many bowel movements.  She saw the patient this morning, and states that marie was clear however, she got a call later this afternoon that the Marie contained blood which prompted their visit today.  Patient hemodynamically stable lying in bed at this time in no acute distress.  Will order basic labs and a Marie catheter exchange and will send a UA.  Labs remarkable for creatinine of 2.2.  No leukocytosis or signs of acute anemia.  No electrolyte abnormalities.  UA with large blood, large leukocyte esterase. Discussed kidney function with family, considering patient is DNR CC had shared decision making to have patient admitted for further monitoring and treatment.  Refer to ED course for additional information.  Patient to be admitted in stable condition.     This patient will be signed to the oncoming fellow resident as my shift is out.  Please see their sign note for additional information    ED COURSE   ED Medications administered this visit (None if left blank):   Medications - No data to display    ED Course as of 07/26/25 1937   Sat Jul 26, 2025 1819 DNR CC, focusing on comfort.  Ensuring no considerable anemia or electrolyte imbalance.  Associated swelling, family states that diuretics have worked with an extra dose in the past.  Will wait for kidney function to make further recommendations. [EM]   1855 Hemoglobin Quant(!): 10.4  Comparable to prior [EM]   1902 CBC with Auto Differential(!):    WBC 9.6   RBC 3.22(!)   Hemoglobin Quant 10.4(!)   Hematocrit 33.5(!)   .0(!)   MCH 32.3   MCHC 31.0(!)   RDW-CV 13.5   RDW-SD

## 2025-07-27 NOTE — ED NOTES
Pt is to be transported to [facility] in stable condition. Floor notified prior to transport.

## 2025-07-28 ENCOUNTER — APPOINTMENT (OUTPATIENT)
Age: 89
DRG: 683 | End: 2025-07-28
Payer: MEDICARE

## 2025-07-28 PROBLEM — N39.0 UTI (URINARY TRACT INFECTION): Status: ACTIVE | Noted: 2025-07-28

## 2025-07-28 LAB
ANION GAP SERPL CALC-SCNC: 11 MEQ/L (ref 8–16)
BACTERIA UR CULT: ABNORMAL
BUN SERPL-MCNC: 23 MG/DL (ref 8–23)
CALCIUM SERPL-MCNC: 8.5 MG/DL (ref 8.2–9.6)
CHLORIDE SERPL-SCNC: 106 MEQ/L (ref 98–111)
CO2 SERPL-SCNC: 24 MEQ/L (ref 22–29)
CREAT SERPL-MCNC: 1.2 MG/DL (ref 0.7–1.2)
DEPRECATED RDW RBC AUTO: 51.8 FL (ref 35–45)
ECHO AO ASC DIAM: 3.5 CM
ECHO AO ASCENDING AORTA INDEX: 1.46 CM/M2
ECHO AV CUSP MM: 1.9 CM
ECHO AV PEAK GRADIENT: 5 MMHG
ECHO AV PEAK VELOCITY: 1.2 M/S
ECHO AV VELOCITY RATIO: 0.67
ECHO BSA: 2.47 M2
ECHO EST RA PRESSURE: 8 MMHG
ECHO LA AREA 2C: 16.4 CM2
ECHO LA AREA 4C: 17.7 CM2
ECHO LA MAJOR AXIS: 6.1 CM
ECHO LA MINOR AXIS: 5.9 CM
ECHO LA VOL BP: 40 ML (ref 18–58)
ECHO LA VOL MOD A2C: 38 ML (ref 18–58)
ECHO LA VOL MOD A4C: 41 ML (ref 18–58)
ECHO LA VOL/BSA BIPLANE: 17 ML/M2 (ref 16–34)
ECHO LA VOLUME INDEX MOD A2C: 16 ML/M2 (ref 16–34)
ECHO LA VOLUME INDEX MOD A4C: 17 ML/M2 (ref 16–34)
ECHO LV E' LATERAL VELOCITY: 7.3 CM/S
ECHO LV E' SEPTAL VELOCITY: 6.1 CM/S
ECHO LV EDV A4C: 106 ML
ECHO LV EDV INDEX A4C: 44 ML/M2
ECHO LV EF PHYSICIAN: 40 %
ECHO LV EJECTION FRACTION A4C: 42 %
ECHO LV ESV A4C: 61 ML
ECHO LV ESV INDEX A4C: 25 ML/M2
ECHO LV FRACTIONAL SHORTENING: 19 % (ref 28–44)
ECHO LV INTERNAL DIMENSION DIASTOLE INDEX: 2.21 CM/M2
ECHO LV INTERNAL DIMENSION DIASTOLIC: 5.3 CM (ref 4.2–5.9)
ECHO LV INTERNAL DIMENSION SYSTOLIC INDEX: 1.79 CM/M2
ECHO LV INTERNAL DIMENSION SYSTOLIC: 4.3 CM
ECHO LV ISOVOLUMETRIC RELAXATION TIME (IVRT): 56 MS
ECHO LV IVSD: 1.2 CM (ref 0.6–1)
ECHO LV MASS 2D: 242 G (ref 88–224)
ECHO LV MASS INDEX 2D: 100.8 G/M2 (ref 49–115)
ECHO LV POSTERIOR WALL DIASTOLIC: 1.1 CM (ref 0.6–1)
ECHO LV RELATIVE WALL THICKNESS RATIO: 0.42
ECHO LVOT PEAK GRADIENT: 3 MMHG
ECHO LVOT PEAK VELOCITY: 0.8 M/S
ECHO MV A VELOCITY: 0.91 M/S
ECHO MV E DECELERATION TIME (DT): 187 MS
ECHO MV E VELOCITY: 0.91 M/S
ECHO MV E/A RATIO: 1
ECHO MV E/E' LATERAL: 12.47
ECHO MV E/E' RATIO (AVERAGED): 13.69
ECHO MV E/E' SEPTAL: 14.92
ECHO PV MAX VELOCITY: 1 M/S
ECHO PV PEAK GRADIENT: 4 MMHG
ECHO RIGHT VENTRICULAR SYSTOLIC PRESSURE (RVSP): 43 MMHG
ECHO RV INTERNAL DIMENSION: 3.6 CM
ECHO RV TAPSE: 1.5 CM (ref 1.7–?)
ECHO TV E WAVE: 0.4 M/S
ECHO TV REGURGITANT MAX VELOCITY: 2.95 M/S
ECHO TV REGURGITANT PEAK GRADIENT: 35 MMHG
ERYTHROCYTE [DISTWIDTH] IN BLOOD BY AUTOMATED COUNT: 13.5 % (ref 11.5–14.5)
GFR SERPL CREATININE-BSD FRML MDRD: 57 ML/MIN/1.73M2
GLUCOSE SERPL-MCNC: 91 MG/DL (ref 74–109)
HCT VFR BLD AUTO: 34.3 % (ref 42–52)
HGB BLD-MCNC: 10.6 GM/DL (ref 14–18)
MCH RBC QN AUTO: 32.1 PG (ref 26–33)
MCHC RBC AUTO-ENTMCNC: 30.9 GM/DL (ref 32.2–35.5)
MCV RBC AUTO: 103.9 FL (ref 80–94)
ORGANISM: ABNORMAL
PLATELET # BLD AUTO: 270 THOU/MM3 (ref 130–400)
PMV BLD AUTO: 9.5 FL (ref 9.4–12.4)
POTASSIUM SERPL-SCNC: 4.8 MEQ/L (ref 3.5–5.2)
RBC # BLD AUTO: 3.3 MILL/MM3 (ref 4.7–6.1)
SODIUM SERPL-SCNC: 141 MEQ/L (ref 135–145)
WBC # BLD AUTO: 7 THOU/MM3 (ref 4.8–10.8)

## 2025-07-28 PROCEDURE — 1200000000 HC SEMI PRIVATE

## 2025-07-28 PROCEDURE — G0378 HOSPITAL OBSERVATION PER HR: HCPCS

## 2025-07-28 PROCEDURE — 6370000000 HC RX 637 (ALT 250 FOR IP): Performed by: NURSE PRACTITIONER

## 2025-07-28 PROCEDURE — 6370000000 HC RX 637 (ALT 250 FOR IP)

## 2025-07-28 PROCEDURE — 80048 BASIC METABOLIC PNL TOTAL CA: CPT

## 2025-07-28 PROCEDURE — 85027 COMPLETE CBC AUTOMATED: CPT

## 2025-07-28 PROCEDURE — 99232 SBSQ HOSP IP/OBS MODERATE 35: CPT | Performed by: NURSE PRACTITIONER

## 2025-07-28 PROCEDURE — C8929 TTE W OR WO FOL WCON,DOPPLER: HCPCS

## 2025-07-28 PROCEDURE — 6370000000 HC RX 637 (ALT 250 FOR IP): Performed by: INTERNAL MEDICINE

## 2025-07-28 PROCEDURE — 2500000003 HC RX 250 WO HCPCS

## 2025-07-28 PROCEDURE — 96361 HYDRATE IV INFUSION ADD-ON: CPT

## 2025-07-28 PROCEDURE — 93306 TTE W/DOPPLER COMPLETE: CPT | Performed by: INTERNAL MEDICINE

## 2025-07-28 PROCEDURE — 6360000004 HC RX CONTRAST MEDICATION

## 2025-07-28 PROCEDURE — 36415 COLL VENOUS BLD VENIPUNCTURE: CPT

## 2025-07-28 RX ORDER — FLUCONAZOLE 200 MG/1
200 TABLET ORAL DAILY
Status: DISCONTINUED | OUTPATIENT
Start: 2025-07-29 | End: 2025-07-29 | Stop reason: HOSPADM

## 2025-07-28 RX ORDER — FLUCONAZOLE 100 MG/1
100 TABLET ORAL ONCE
Status: COMPLETED | OUTPATIENT
Start: 2025-07-28 | End: 2025-07-28

## 2025-07-28 RX ORDER — FUROSEMIDE 20 MG/1
20 TABLET ORAL DAILY
Status: DISCONTINUED | OUTPATIENT
Start: 2025-07-28 | End: 2025-07-29 | Stop reason: HOSPADM

## 2025-07-28 RX ORDER — FLUCONAZOLE 100 MG/1
100 TABLET ORAL DAILY
Status: DISCONTINUED | OUTPATIENT
Start: 2025-07-28 | End: 2025-07-28

## 2025-07-28 RX ADMIN — SODIUM CHLORIDE, PRESERVATIVE FREE 10 ML: 5 INJECTION INTRAVENOUS at 08:18

## 2025-07-28 RX ADMIN — FUROSEMIDE 20 MG: 20 TABLET ORAL at 11:48

## 2025-07-28 RX ADMIN — SODIUM CHLORIDE, PRESERVATIVE FREE 10 ML: 5 INJECTION INTRAVENOUS at 22:25

## 2025-07-28 RX ADMIN — FLUCONAZOLE 100 MG: 100 TABLET ORAL at 11:48

## 2025-07-28 RX ADMIN — Medication 3 MG: at 22:25

## 2025-07-28 RX ADMIN — SULFUR HEXAFLUORIDE 2 ML: KIT at 11:59

## 2025-07-28 RX ADMIN — TAMSULOSIN HYDROCHLORIDE 0.4 MG: 0.4 CAPSULE ORAL at 08:18

## 2025-07-28 RX ADMIN — FAMOTIDINE 20 MG: 20 TABLET, FILM COATED ORAL at 08:18

## 2025-07-28 RX ADMIN — ATORVASTATIN CALCIUM 20 MG: 20 TABLET, FILM COATED ORAL at 22:25

## 2025-07-28 RX ADMIN — FOLIC ACID 1 MG: 1 TABLET ORAL at 08:18

## 2025-07-28 RX ADMIN — METOPROLOL TARTRATE 50 MG: 50 TABLET, FILM COATED ORAL at 22:25

## 2025-07-28 RX ADMIN — FLUCONAZOLE 100 MG: 100 TABLET ORAL at 14:26

## 2025-07-28 RX ADMIN — METOPROLOL TARTRATE 50 MG: 50 TABLET, FILM COATED ORAL at 08:18

## 2025-07-28 RX ADMIN — FINASTERIDE 5 MG: 5 TABLET, FILM COATED ORAL at 08:18

## 2025-07-28 ASSESSMENT — PAIN SCALES - GENERAL: PAINLEVEL_OUTOF10: 0

## 2025-07-28 NOTE — PROGRESS NOTES
Does she need time off from work?   Patient received sacramental anointing by a . If you are in need of  support, please call [phone]. If you are in need of a  after 6:30pm, please call the house supervisor for the on-call .     Rev. Jun Crandall MDiv, [facility]  Director of Spiritual Health  O: [phone]    To reach a  call [phone]

## 2025-07-28 NOTE — CARE COORDINATION
7/28/25, 2:38 PM EDT  Discharge Planning Evaluation  Social work consult received, patient from [facility].    Patient/Family preference is to return to [facility] per patient and daughter.    The patient's current payor source at the facility is Medicare.   Medicare skilled days available: yes  Medicare does the patient have a three midnight qualifying stay? N/a  Insurance precert:  no  Spoke with Deepthi at the facility.  Patient bed hold: unofficial hold  Anticipated transport plan: [facility]  Patient's Healthcare Decision Maker: Named in Scanned ACP Document      Readmission Risk Low 0-14, Mod 15-19), High > 20: Readmission Risk Score: 15.4    Current PCP: Nael Parks, PEPITO - CNP  PCP verified by CM? Yes    Patient Orientation: Alert and Oriented    Patient Cognition: Alert  History Provided by: Patient, Child/Family    Advance Directives:      Code Status: DNR-CC   Patient's Primary Decision Maker is: Named in Scanned ACP Document    Primary Decision Maker: Lili Brown - Child - [phone]    Secondary Decision Maker: Clive Ochoa - Child - [phone]     Discharge Planning:    Patient lives with: Other (Comment) (SNF) Type of Home: Skilled Nursing Facility  Primary Care Giver: Other (Comment) ([facility])  Patient Support Systems include: Children   Current Financial resources: Medicare  Current community resources:    Current services prior to admission: Skilled Nursing Facility            Current DME:              Type of Home Care services:  Nursing Services    ADLS  Prior functional level: Assistance with the following:, Bathing, Dressing, Toileting, Cooking, Housework, Shopping, Mobility  Current functional level: Assistance with the following:, Bathing, Dressing, Toileting, Cooking, Housework, Shopping, Mobility    Family can provide assistance at DC: No  Would you like Case Management to discuss the discharge plan with any other family members/significant others, and if so,

## 2025-07-28 NOTE — PROGRESS NOTES
Hospitalist Progress Note    Patient:  Walter Ochoa      Unit/Bed:[facility]06/006-A    YOB: 1934    MRN: [id number]       Acct: [id number]     PCP: Nael Parks APRN - CNP    Date of Admission: 7/26/2025    Assessment/Plan:    MOON on CKD stage IIIa--improved to 1.2, appears baseline creatinine around 1.1 area, will resume Lasix 20 mg daily, he takes 40 mg daily at home  UTI (POA) with Candida albicans with hematuria possibly secondary to chronic indwelling Marie catheter--Marie catheter was exchanged in the ER on 7/26; Rocephin from 7/26-7/27; will add Diflucan 100 mg daily for 5 days from 7/28  Chronic microcytic anemia--stable  Atrial flutter with probable secondary hypercoagulable state--patient is not on any anticoagulation as risk outweigh benefits secondary to advanced age, on Lopressor  Primary hypertension, uncontrolled--Lopressor, monitor  Hyperlipidemia--on statin  Chronic macrocytic anemia--stable  Chronic heart failure with mildly reduced EF  BPH with urinary retention without obstruction--on Flomax  ARGENIS--on CPAP  CAD status post CABG  COPD--stable, on room air  Obesity class III with BMI 44.39  CODE STATUS--DNR comfort care      Expected discharge date: Pending clinical course, likely 7/28    Disposition:    [] Home       [] TCU       [] Rehab       [] Psych       [x] SNF       [] Long Term Care Facility       [] Other-    Chief Complaint: Blood in the urine    Hospital Course: Per H&P done 7/26/2025: \"Walter Ochoa is a 89 y.o. male with PMHx of HFmrEF, A. Flutter, CAD s/p CABG x3, COPD, CKD, BPH, HTN who presents to [facility] on 7/26 with complaints of hematuria in the marie bag. Pt was started on bactrim on Sunday 7/20 due to fevers and concerns for UTI. Pt also reports having 4 loose bowel movements yesterday 7/25, now resolved likely due to abx. Daughter was present bedside and she reports pt had clear marie morning of admission, however she was

## 2025-07-28 NOTE — CARE COORDINATION
Case Management Assessment Initial Evaluation    Date/Time of Evaluation: 7/28/2025 10:58 AM  Assessment Completed by: Kelsea Han RN    If patient is discharged prior to next notation, then this note serves as note for discharge by case management.    Patient Name: Walter Ochoa                   YOB: 1934  Diagnosis: MOON (acute kidney injury) [N17.9]  Hematuria, unspecified type [R31.9]                   Date / Time: 7/26/2025  5:19 PM  Location: [facility]     Patient Admission Status: Observation   Readmission Risk Low 0-14, Mod 15-19), High > 20: Readmission Risk Score: 15.4    Current PCP: Nael Parks, PEPITO - CNP  Health Care Decision Makers:   Primary Decision Maker: Lili Brown - Child - [phone]    Secondary Decision Maker: Clive Ochoa - Child - [phone]    Additional Case Management Notes: Admitted from ER with hematuria. Being treated to UTI. Clements present on admission. Creat was 2.5, 1.2 today. UA: large blood, trace protein, cloudy, growing candida albicans. PO Diflucan.     Procedures: none    Imaging: none    Patient Goals/Plan/Treatment Preferences: From [facility]. Full assessment to be completed by . They do prefer [facility] for transportation.

## 2025-07-29 VITALS
RESPIRATION RATE: 16 BRPM | HEART RATE: 68 BPM | OXYGEN SATURATION: 96 % | SYSTOLIC BLOOD PRESSURE: 147 MMHG | HEIGHT: 68 IN | WEIGHT: 291.89 LBS | BODY MASS INDEX: 44.24 KG/M2 | DIASTOLIC BLOOD PRESSURE: 67 MMHG | TEMPERATURE: 97.7 F

## 2025-07-29 LAB
ANION GAP SERPL CALC-SCNC: 11 MEQ/L (ref 8–16)
BUN SERPL-MCNC: 22 MG/DL (ref 8–23)
CALCIUM SERPL-MCNC: 9 MG/DL (ref 8.2–9.6)
CHLORIDE SERPL-SCNC: 107 MEQ/L (ref 98–111)
CO2 SERPL-SCNC: 24 MEQ/L (ref 22–29)
CREAT SERPL-MCNC: 2 MG/DL (ref 0.7–1.2)
GFR SERPL CREATININE-BSD FRML MDRD: 31 ML/MIN/1.73M2
GLUCOSE BLD STRIP.AUTO-MCNC: 100 MG/DL (ref 70–108)
GLUCOSE SERPL-MCNC: 101 MG/DL (ref 74–109)
POTASSIUM SERPL-SCNC: 4.7 MEQ/L (ref 3.5–5.2)
REASON FOR REJECTION: NORMAL
REJECTED TEST: NORMAL
SODIUM SERPL-SCNC: 142 MEQ/L (ref 135–145)

## 2025-07-29 PROCEDURE — 82948 REAGENT STRIP/BLOOD GLUCOSE: CPT

## 2025-07-29 PROCEDURE — 6370000000 HC RX 637 (ALT 250 FOR IP): Performed by: NURSE PRACTITIONER

## 2025-07-29 PROCEDURE — 36415 COLL VENOUS BLD VENIPUNCTURE: CPT

## 2025-07-29 PROCEDURE — 99239 HOSP IP/OBS DSCHRG MGMT >30: CPT | Performed by: NURSE PRACTITIONER

## 2025-07-29 PROCEDURE — 6370000000 HC RX 637 (ALT 250 FOR IP): Performed by: INTERNAL MEDICINE

## 2025-07-29 PROCEDURE — 2500000003 HC RX 250 WO HCPCS

## 2025-07-29 PROCEDURE — 6370000000 HC RX 637 (ALT 250 FOR IP)

## 2025-07-29 PROCEDURE — 80048 BASIC METABOLIC PNL TOTAL CA: CPT

## 2025-07-29 RX ORDER — ACETAMINOPHEN 500 MG
1000 TABLET ORAL EVERY MORNING
DISCHARGE
Start: 2025-07-29

## 2025-07-29 RX ORDER — ATORVASTATIN CALCIUM 20 MG/1
20 TABLET, FILM COATED ORAL NIGHTLY
DISCHARGE
Start: 2025-07-29

## 2025-07-29 RX ORDER — FAMOTIDINE 20 MG/1
20 TABLET, FILM COATED ORAL DAILY
DISCHARGE
Start: 2025-07-29

## 2025-07-29 RX ORDER — SENNA AND DOCUSATE SODIUM 50; 8.6 MG/1; MG/1
2 TABLET, FILM COATED ORAL
DISCHARGE
Start: 2025-07-29

## 2025-07-29 RX ORDER — FINASTERIDE 5 MG/1
5 TABLET, FILM COATED ORAL DAILY
DISCHARGE
Start: 2025-07-29

## 2025-07-29 RX ORDER — ALFUZOSIN HYDROCHLORIDE 10 MG/1
10 TABLET, EXTENDED RELEASE ORAL DAILY
DISCHARGE
Start: 2025-07-29

## 2025-07-29 RX ORDER — AMMONIUM LACTATE 12 G/100G
LOTION TOPICAL
DISCHARGE
Start: 2025-07-29

## 2025-07-29 RX ORDER — M-VIT,TX,IRON,MINS/CALC/FOLIC 27MG-0.4MG
1 TABLET ORAL DAILY
DISCHARGE
Start: 2025-07-29

## 2025-07-29 RX ORDER — FLUCONAZOLE 200 MG/1
200 TABLET ORAL DAILY
DISCHARGE
Start: 2025-07-30 | End: 2025-08-02

## 2025-07-29 RX ORDER — FOLIC ACID 1 MG/1
1 TABLET ORAL DAILY
DISCHARGE
Start: 2025-07-30

## 2025-07-29 RX ORDER — METOPROLOL TARTRATE 50 MG
50 TABLET ORAL 2 TIMES DAILY
DISCHARGE
Start: 2025-07-29

## 2025-07-29 RX ORDER — CHOLECALCIFEROL (VITAMIN D3) 50 MCG
1000 TABLET ORAL 2 TIMES DAILY
DISCHARGE
Start: 2025-07-29

## 2025-07-29 RX ADMIN — FAMOTIDINE 20 MG: 20 TABLET, FILM COATED ORAL at 08:06

## 2025-07-29 RX ADMIN — FOLIC ACID 1 MG: 1 TABLET ORAL at 08:06

## 2025-07-29 RX ADMIN — FINASTERIDE 5 MG: 5 TABLET, FILM COATED ORAL at 08:06

## 2025-07-29 RX ADMIN — FUROSEMIDE 20 MG: 20 TABLET ORAL at 08:05

## 2025-07-29 RX ADMIN — SODIUM CHLORIDE, PRESERVATIVE FREE 10 ML: 5 INJECTION INTRAVENOUS at 08:05

## 2025-07-29 RX ADMIN — FLUCONAZOLE 200 MG: 200 TABLET ORAL at 08:05

## 2025-07-29 RX ADMIN — TAMSULOSIN HYDROCHLORIDE 0.4 MG: 0.4 CAPSULE ORAL at 08:05

## 2025-07-29 RX ADMIN — METOPROLOL TARTRATE 50 MG: 50 TABLET, FILM COATED ORAL at 08:05

## 2025-07-29 ASSESSMENT — PAIN SCALES - GENERAL
PAINLEVEL_OUTOF10: 0
PAINLEVEL_OUTOF10: 0

## 2025-07-29 NOTE — PLAN OF CARE
Problem: Chronic Conditions and Co-morbidities  Goal: Patient's chronic conditions and co-morbidity symptoms are monitored and maintained or improved  Outcome: Progressing     Problem: Discharge Planning  Goal: Discharge to home or other facility with appropriate resources  7/29/2025 0416 by Anabela Figueroa, RN  Outcome: Progressing  7/28/2025 1445 by Bee Hutton LSW  Outcome: Progressing     Problem: Skin/Tissue Integrity  Goal: Skin integrity remains intact  Description: 1.  Monitor for areas of redness and/or skin breakdown  2.  Assess vascular access sites hourly  3.  Every 4-6 hours minimum:  Change oxygen saturation probe site  4.  Every 4-6 hours:  If on nasal continuous positive airway pressure, respiratory therapy assess nares and determine need for appliance change or resting period  Outcome: Progressing     Problem: ABCDS Injury Assessment  Goal: Absence of physical injury  Outcome: Progressing     Problem: Safety - Adult  Goal: Free from fall injury  Outcome: Progressing

## 2025-07-29 NOTE — PROGRESS NOTES
Patient discharged via [facility]. Family at bedside during discharge. Discharge instructions given to daughter April. No questions, or complaints. All questions answered. Report called to Vale, report given via phone call to Nette BLANCO.

## 2025-07-29 NOTE — DISCHARGE SUMMARY
Hospital Medicine Discharge Summary      Patient Identification:   Walter Ochoa   : 2/10/1934  MRN: [id number]   Account: [id number]      Patient's PCP: Nael Parks APRN - CNP    Admit Date: 2025     Discharge Date:   2025    Admitting Physician: PEPITO Smith CNP     Discharging Nurse Practitioner: PEPITO Smith CNP     Discharge Diagnoses with Assessment/Plan:  MOON on CKD stage IIIa--creatinine increased to 2.0~I spoke with the patient's daughter, April and explained the situation she states that she knows her father is in poor health, she would like for him to stay on the Lasix to avoid swelling and shortness of breath, she states that her father does not want to return to the hospital and that she has spoken to [facility] regarding the case, appears baseline creatinine around 1.1 area; questioning if MOON induced by Bactrim; daughter does not feel the need to repeat any lab work outpatient as the goal is comfort  UTI (POA) with Candida albicans with hematuria possibly secondary to chronic indwelling Clements catheter--Clements catheter was exchanged in the ER on ; Rocephin from -; Diflucan 100 mg daily for 5 days from ; Clements catheter draining clear urine on  and   Chronic microcytic anemia--stable  Atrial flutter with probable secondary hypercoagulable state--patient is not on any anticoagulation as risk outweigh benefits secondary to advanced age, on Lopressor  Primary hypertension, uncontrolled--Lopressor, stable  Hyperlipidemia--on statin  Chronic macrocytic anemia--stable  Chronic heart failure with mildly reduced EF--echo with EF 40 to 45%  BPH with urinary retention 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

## 2025-07-29 NOTE — CARE COORDINATION
7/29/25, 10:10 AM EDT    Patient goals/plan/ treatment preferences discussed by  and .  Patient goals/plan/ treatment preferences reviewed with patient/ family.  Patient/ family verbalize understanding of discharge plan and are in agreement with goal/plan/treatment preferences.  Understanding was demonstrated using the teach back method.  AVS provided by RN at time of discharge, which includes all necessary medical information pertaining to the patients current course of illness, treatment, post-discharge goals of care, and treatment preferences.     Services At/After Discharge: Skilled Nursing Facility (SNF), Aide services, In ambulance, Nursing service, OT, and PT       Patient discharged to return to [facility] skilled medicare bed.  Deepthi at [facility] informed of discharge and 10:00 transport, faxed AVS and MAR.  RN aware to call report. Spoke with patient and daughter, aware of discharge and 10:00 [facility] transport.

## (undated) DEVICE — SPONGE GZ W4XL4IN COT 12 PLY TYP VII WVN C FLD DSGN

## (undated) DEVICE — STERILE COTTON BALLS LARGE 5/P: Brand: MEDLINE

## (undated) DEVICE — PACK PROCEDURE SURG PLAS SC MIN SRHP LF

## (undated) DEVICE — GLOVE ORANGE PI 7 1/2   MSG9075

## (undated) DEVICE — GLOVE ORANGE PI 8   MSG9080

## (undated) DEVICE — GOWN,SIRUS,NON REINFRCD,LARGE,SET IN SL: Brand: MEDLINE

## (undated) DEVICE — GLOVE ORANGE PI 7   MSG9070

## (undated) DEVICE — TUBING, SUCTION, 1/4" X 12', STRAIGHT: Brand: MEDLINE

## (undated) DEVICE — YANKAUER,BULB TIP,W/O VENT,RIGID,STERILE: Brand: MEDLINE

## (undated) DEVICE — DRESSING,GAUZE,XEROFORM,CURAD,5"X9",ST: Brand: CURAD

## (undated) DEVICE — GLOVE SURG SZ 8 L11.77IN FNGR THK9.8MIL STRW LTX POLYMER